# Patient Record
Sex: FEMALE | Race: WHITE | NOT HISPANIC OR LATINO | URBAN - METROPOLITAN AREA
[De-identification: names, ages, dates, MRNs, and addresses within clinical notes are randomized per-mention and may not be internally consistent; named-entity substitution may affect disease eponyms.]

---

## 2023-05-08 ENCOUNTER — INPATIENT (INPATIENT)
Facility: HOSPITAL | Age: 73
LOS: 13 days | Discharge: NURSING, REHAB OR LONG-TERM CARE | DRG: 177 | End: 2023-05-22
Attending: GENERAL ACUTE CARE HOSPITAL | Admitting: GENERAL ACUTE CARE HOSPITAL
Payer: MEDICARE

## 2023-05-08 VITALS
DIASTOLIC BLOOD PRESSURE: 57 MMHG | OXYGEN SATURATION: 99 % | WEIGHT: 93.92 LBS | RESPIRATION RATE: 16 BRPM | HEART RATE: 89 BPM | HEIGHT: 67 IN | SYSTOLIC BLOOD PRESSURE: 125 MMHG | TEMPERATURE: 97 F

## 2023-05-08 DIAGNOSIS — R53.1 WEAKNESS: ICD-10-CM

## 2023-05-08 DIAGNOSIS — Z98.890 OTHER SPECIFIED POSTPROCEDURAL STATES: Chronic | ICD-10-CM

## 2023-05-08 LAB
ALBUMIN SERPL ELPH-MCNC: 1.8 G/DL — LOW (ref 3.3–5)
ALBUMIN SERPL ELPH-MCNC: 4.7 G/DL — SIGNIFICANT CHANGE UP (ref 3.3–5)
ALP SERPL-CCNC: 31 U/L — LOW (ref 40–120)
ALP SERPL-CCNC: 82 U/L — SIGNIFICANT CHANGE UP (ref 40–120)
ALT FLD-CCNC: 21 U/L — SIGNIFICANT CHANGE UP (ref 10–45)
ALT FLD-CCNC: 9 U/L — LOW (ref 10–45)
ANION GAP SERPL CALC-SCNC: 12 MMOL/L — SIGNIFICANT CHANGE UP (ref 5–17)
APAP SERPL-MCNC: <15 UG/ML — SIGNIFICANT CHANGE UP (ref 10–30)
APPEARANCE UR: ABNORMAL
AST SERPL-CCNC: 22 U/L — SIGNIFICANT CHANGE UP (ref 10–40)
AST SERPL-CCNC: 22 U/L — SIGNIFICANT CHANGE UP (ref 10–40)
BACTERIA # UR AUTO: ABNORMAL
BASE EXCESS BLDV CALC-SCNC: 6.6 MMOL/L — HIGH (ref -2–3)
BASOPHILS # BLD AUTO: 0.06 K/UL — SIGNIFICANT CHANGE UP (ref 0–0.2)
BASOPHILS NFR BLD AUTO: 1.2 % — SIGNIFICANT CHANGE UP (ref 0–2)
BILIRUB DIRECT SERPL-MCNC: <0.1 MG/DL — SIGNIFICANT CHANGE UP (ref 0–0.3)
BILIRUB INDIRECT FLD-MCNC: >0 MG/DL — LOW (ref 0.2–1)
BILIRUB SERPL-MCNC: 0.1 MG/DL — LOW (ref 0.2–1.2)
BILIRUB SERPL-MCNC: 0.4 MG/DL — SIGNIFICANT CHANGE UP (ref 0.2–1.2)
BILIRUB UR-MCNC: NEGATIVE — SIGNIFICANT CHANGE UP
BUN SERPL-MCNC: 34 MG/DL — HIGH (ref 7–23)
CA-I SERPL-SCNC: 1.29 MMOL/L — SIGNIFICANT CHANGE UP (ref 1.15–1.33)
CALCIUM SERPL-MCNC: 10.6 MG/DL — HIGH (ref 8.4–10.5)
CHLORIDE BLDV-SCNC: 101 MMOL/L — SIGNIFICANT CHANGE UP (ref 96–108)
CHLORIDE SERPL-SCNC: 101 MMOL/L — SIGNIFICANT CHANGE UP (ref 96–108)
CO2 BLDV-SCNC: 35 MMOL/L — HIGH (ref 22–26)
CO2 SERPL-SCNC: 28 MMOL/L — SIGNIFICANT CHANGE UP (ref 22–31)
COLOR SPEC: COLORLESS — SIGNIFICANT CHANGE UP
CREAT SERPL-MCNC: 0.44 MG/DL — LOW (ref 0.5–1.3)
CREAT SERPL-MCNC: 1.39 MG/DL — HIGH (ref 0.5–1.3)
D DIMER BLD IA.RAPID-MCNC: HIGH NG/ML DDU
DIFF PNL FLD: ABNORMAL
EGFR: 101 ML/MIN/1.73M2 — SIGNIFICANT CHANGE UP
EGFR: 40 ML/MIN/1.73M2 — LOW
EOSINOPHIL # BLD AUTO: 0.01 K/UL — SIGNIFICANT CHANGE UP (ref 0–0.5)
EOSINOPHIL NFR BLD AUTO: 0.2 % — SIGNIFICANT CHANGE UP (ref 0–6)
EPI CELLS # UR: 1 /HPF — SIGNIFICANT CHANGE UP
ETHANOL SERPL-MCNC: <10 MG/DL — SIGNIFICANT CHANGE UP (ref 0–10)
FLUAV AG NPH QL: SIGNIFICANT CHANGE UP
FLUBV AG NPH QL: SIGNIFICANT CHANGE UP
GAS PNL BLDV: 139 MMOL/L — SIGNIFICANT CHANGE UP (ref 136–145)
GAS PNL BLDV: SIGNIFICANT CHANGE UP
GLUCOSE BLDV-MCNC: 97 MG/DL — SIGNIFICANT CHANGE UP (ref 70–99)
GLUCOSE SERPL-MCNC: 99 MG/DL — SIGNIFICANT CHANGE UP (ref 70–99)
GLUCOSE UR QL: NEGATIVE — SIGNIFICANT CHANGE UP
HCO3 BLDV-SCNC: 33 MMOL/L — HIGH (ref 22–29)
HCT VFR BLD CALC: 47.2 % — HIGH (ref 34.5–45)
HCT VFR BLDA CALC: 48 % — HIGH (ref 34.5–46.5)
HGB BLD CALC-MCNC: 16.1 G/DL — SIGNIFICANT CHANGE UP (ref 11.7–16.1)
HGB BLD-MCNC: 16.1 G/DL — HIGH (ref 11.5–15.5)
HYALINE CASTS # UR AUTO: 1 /LPF — SIGNIFICANT CHANGE UP (ref 0–2)
IMM GRANULOCYTES NFR BLD AUTO: 0.2 % — SIGNIFICANT CHANGE UP (ref 0–0.9)
INR BLD: 0.93 RATIO — SIGNIFICANT CHANGE UP (ref 0.88–1.16)
KETONES UR-MCNC: NEGATIVE — SIGNIFICANT CHANGE UP
LACTATE BLDV-MCNC: 1.8 MMOL/L — SIGNIFICANT CHANGE UP (ref 0.5–2)
LEUKOCYTE ESTERASE UR-ACNC: ABNORMAL
LIDOCAIN IGE QN: 78 U/L — HIGH (ref 7–60)
LYMPHOCYTES # BLD AUTO: 0.76 K/UL — LOW (ref 1–3.3)
LYMPHOCYTES # BLD AUTO: 14.8 % — SIGNIFICANT CHANGE UP (ref 13–44)
MAGNESIUM SERPL-MCNC: 2.5 MG/DL — SIGNIFICANT CHANGE UP (ref 1.6–2.6)
MCHC RBC-ENTMCNC: 31.6 PG — SIGNIFICANT CHANGE UP (ref 27–34)
MCHC RBC-ENTMCNC: 34.1 GM/DL — SIGNIFICANT CHANGE UP (ref 32–36)
MCV RBC AUTO: 92.7 FL — SIGNIFICANT CHANGE UP (ref 80–100)
MONOCYTES # BLD AUTO: 0.36 K/UL — SIGNIFICANT CHANGE UP (ref 0–0.9)
MONOCYTES NFR BLD AUTO: 7 % — SIGNIFICANT CHANGE UP (ref 2–14)
NEUTROPHILS # BLD AUTO: 3.95 K/UL — SIGNIFICANT CHANGE UP (ref 1.8–7.4)
NEUTROPHILS NFR BLD AUTO: 76.6 % — SIGNIFICANT CHANGE UP (ref 43–77)
NITRITE UR-MCNC: POSITIVE
NRBC # BLD: 0 /100 WBCS — SIGNIFICANT CHANGE UP (ref 0–0)
PCO2 BLDV: 52 MMHG — HIGH (ref 39–42)
PH BLDV: 7.41 — SIGNIFICANT CHANGE UP (ref 7.32–7.43)
PH UR: 7 — SIGNIFICANT CHANGE UP (ref 5–8)
PHOSPHATE SERPL-MCNC: 2.4 MG/DL — LOW (ref 2.5–4.5)
PLATELET # BLD AUTO: 222 K/UL — SIGNIFICANT CHANGE UP (ref 150–400)
PO2 BLDV: 30 MMHG — SIGNIFICANT CHANGE UP (ref 25–45)
POTASSIUM BLDV-SCNC: 3.9 MMOL/L — SIGNIFICANT CHANGE UP (ref 3.5–5.1)
POTASSIUM SERPL-MCNC: 3.8 MMOL/L — SIGNIFICANT CHANGE UP (ref 3.5–5.3)
POTASSIUM SERPL-SCNC: 3.8 MMOL/L — SIGNIFICANT CHANGE UP (ref 3.5–5.3)
PROT SERPL-MCNC: 3.1 G/DL — LOW (ref 6–8.3)
PROT SERPL-MCNC: 7.5 G/DL — SIGNIFICANT CHANGE UP (ref 6–8.3)
PROT UR-MCNC: ABNORMAL
PROTHROM AB SERPL-ACNC: 10.7 SEC — SIGNIFICANT CHANGE UP (ref 10.5–13.4)
RBC # BLD: 5.09 M/UL — SIGNIFICANT CHANGE UP (ref 3.8–5.2)
RBC # FLD: 13.4 % — SIGNIFICANT CHANGE UP (ref 10.3–14.5)
RBC CASTS # UR COMP ASSIST: 3 /HPF — SIGNIFICANT CHANGE UP (ref 0–4)
RSV RNA NPH QL NAA+NON-PROBE: SIGNIFICANT CHANGE UP
SALICYLATES SERPL-MCNC: <2 MG/DL — LOW (ref 15–30)
SAO2 % BLDV: 47.1 % — LOW (ref 67–88)
SARS-COV-2 RNA SPEC QL NAA+PROBE: DETECTED
SODIUM SERPL-SCNC: 141 MMOL/L — SIGNIFICANT CHANGE UP (ref 135–145)
SP GR SPEC: 1.01 — LOW (ref 1.01–1.02)
UROBILINOGEN FLD QL: NEGATIVE — SIGNIFICANT CHANGE UP
WBC # BLD: 5.15 K/UL — SIGNIFICANT CHANGE UP (ref 3.8–10.5)
WBC # FLD AUTO: 5.15 K/UL — SIGNIFICANT CHANGE UP (ref 3.8–10.5)
WBC UR QL: 314 /HPF — HIGH (ref 0–5)

## 2023-05-08 PROCEDURE — 71045 X-RAY EXAM CHEST 1 VIEW: CPT | Mod: 26

## 2023-05-08 PROCEDURE — 99285 EMERGENCY DEPT VISIT HI MDM: CPT | Mod: GC

## 2023-05-08 PROCEDURE — 70450 CT HEAD/BRAIN W/O DYE: CPT | Mod: 26,MA

## 2023-05-08 RX ORDER — SODIUM CHLORIDE 9 MG/ML
500 INJECTION INTRAMUSCULAR; INTRAVENOUS; SUBCUTANEOUS ONCE
Refills: 0 | Status: COMPLETED | OUTPATIENT
Start: 2023-05-08 | End: 2023-05-08

## 2023-05-08 RX ORDER — ENOXAPARIN SODIUM 100 MG/ML
30 INJECTION SUBCUTANEOUS EVERY 24 HOURS
Refills: 0 | Status: DISCONTINUED | OUTPATIENT
Start: 2023-05-08 | End: 2023-05-09

## 2023-05-08 RX ORDER — REMDESIVIR 5 MG/ML
INJECTION INTRAVENOUS
Refills: 0 | Status: COMPLETED | OUTPATIENT
Start: 2023-05-09 | End: 2023-05-13

## 2023-05-08 RX ORDER — CEFTRIAXONE 500 MG/1
1000 INJECTION, POWDER, FOR SOLUTION INTRAMUSCULAR; INTRAVENOUS EVERY 24 HOURS
Refills: 0 | Status: DISCONTINUED | OUTPATIENT
Start: 2023-05-08 | End: 2023-05-10

## 2023-05-08 RX ORDER — CEFTRIAXONE 500 MG/1
1000 INJECTION, POWDER, FOR SOLUTION INTRAMUSCULAR; INTRAVENOUS ONCE
Refills: 0 | Status: COMPLETED | OUTPATIENT
Start: 2023-05-08 | End: 2023-05-08

## 2023-05-08 RX ORDER — SODIUM CHLORIDE 9 MG/ML
1000 INJECTION INTRAMUSCULAR; INTRAVENOUS; SUBCUTANEOUS ONCE
Refills: 0 | Status: COMPLETED | OUTPATIENT
Start: 2023-05-08 | End: 2023-05-08

## 2023-05-08 RX ORDER — REMDESIVIR 5 MG/ML
100 INJECTION INTRAVENOUS EVERY 24 HOURS
Refills: 0 | Status: COMPLETED | OUTPATIENT
Start: 2023-05-10 | End: 2023-05-13

## 2023-05-08 RX ORDER — REMDESIVIR 5 MG/ML
200 INJECTION INTRAVENOUS EVERY 24 HOURS
Refills: 0 | Status: COMPLETED | OUTPATIENT
Start: 2023-05-09 | End: 2023-05-09

## 2023-05-08 RX ORDER — LEVOTHYROXINE SODIUM 125 MCG
112 TABLET ORAL DAILY
Refills: 0 | Status: DISCONTINUED | OUTPATIENT
Start: 2023-05-08 | End: 2023-05-11

## 2023-05-08 RX ORDER — SODIUM CHLORIDE 9 MG/ML
1000 INJECTION INTRAMUSCULAR; INTRAVENOUS; SUBCUTANEOUS
Refills: 0 | Status: DISCONTINUED | OUTPATIENT
Start: 2023-05-08 | End: 2023-05-12

## 2023-05-08 RX ADMIN — SODIUM CHLORIDE 80 MILLILITER(S): 9 INJECTION INTRAMUSCULAR; INTRAVENOUS; SUBCUTANEOUS at 23:01

## 2023-05-08 RX ADMIN — SODIUM CHLORIDE 1000 MILLILITER(S): 9 INJECTION INTRAMUSCULAR; INTRAVENOUS; SUBCUTANEOUS at 14:54

## 2023-05-08 RX ADMIN — SODIUM CHLORIDE 500 MILLILITER(S): 9 INJECTION INTRAMUSCULAR; INTRAVENOUS; SUBCUTANEOUS at 13:29

## 2023-05-08 RX ADMIN — CEFTRIAXONE 1000 MILLIGRAM(S): 500 INJECTION, POWDER, FOR SOLUTION INTRAMUSCULAR; INTRAVENOUS at 15:24

## 2023-05-08 RX ADMIN — SODIUM CHLORIDE 1000 MILLILITER(S): 9 INJECTION INTRAMUSCULAR; INTRAVENOUS; SUBCUTANEOUS at 15:54

## 2023-05-08 RX ADMIN — CEFTRIAXONE 100 MILLIGRAM(S): 500 INJECTION, POWDER, FOR SOLUTION INTRAMUSCULAR; INTRAVENOUS at 14:54

## 2023-05-08 RX ADMIN — SODIUM CHLORIDE 500 MILLILITER(S): 9 INJECTION INTRAMUSCULAR; INTRAVENOUS; SUBCUTANEOUS at 12:29

## 2023-05-08 NOTE — ED PROVIDER NOTE - CLINICAL SUMMARY MEDICAL DECISION MAKING FREE TEXT BOX
Tanna [age]-year-old female with history of bipolar depression not on any medications presenting to the ED with complaints of decreased appetite.  Per EMS patient lives at home with her  who passed in 2020.  Had initially been active following her 's passing however has been progressively having decreased activity, decreased p.o. intake.  Her neighbor comes and checks on her several times a week and was concerned today.  Patient reports that she has been losing weight unable to quantify how much and over how long a period of time.  Denies any abdominal pain or difficulty eating.  Notes decreased appetite.  Had a salad for dinner last night.  States decreased bowel movements due to decreased p.o. intake, last bowel movement several weeks ago.  States that she has trouble concentrating and comprehending.  Denies headache urinary symptoms.  Denies thoughts of hurting herself or others.    Patient in no acute distress, flat affect.  Slow to respond with difficulty answering questions.  Poor historian.  Atraumatic normocephalic, dry mucous membranes.  Regular rate and rhythm, lungs clear to auscultation bilaterally no increased work of breathing saturating well on room air and speaking in full sentences.  No anterior chest wall tenderness.  Abdomen is soft nontender no rebound or guarding.  No lower extremity edema.  Moving all extremities spontaneously no focal neurological deficits.  Patient with possible depressive episode, infection.  Patient slow to respond states difficulty comprehending will obtain CT head, labs, chest x-ray, concern for possible starvation acidosis hyponatremia.  IV fluid and admission.

## 2023-05-08 NOTE — H&P ADULT - HISTORY OF PRESENT ILLNESS
72-year-old female with history of bipolar disorder and dementia ?   hypothyroidism s/p thyroidectomy ( had thyroid cancer )   comes into the ED via EMS for failure to thrive.  Spoke with her niece,     Mylene   at [phone], who lives in [place] and is the patient's healthcare proxy. pt is a  , lives by herself ... her neighbor would take her food shopping.belongs to [employer] who also help.   she is incoherent and " withdrawn" and confused...  Lost her " common sense"   was not eating or drinking and lost 20lbs   She used to live with her  that he passed away in September 2020.  She was initially depressed for a brief period of time but returned to her baseline and was active until a month ago.  Her niece reports she used to be on lithium for her bipolar but has not been treated for the past 8 years.  she also reports she used to be on valium for " extreme insomnia " which pt had stopped for " sometime...     Currently in the emergency department patient does not complain of any pain or discomfort.  She states she has a decreased appetite and does not have any pain, nausea, vomiting or inability to eat.  Patient is not able to provide much of the history and she keeps stating she feels out of it and is unable to concentrate on anything.    pt was found to have COVID positve RVP.. a month ago she had fever , vomitting..    pt is not able to provide any information

## 2023-05-08 NOTE — ED PROVIDER NOTE - OBJECTIVE STATEMENT
72-year-old female with history of bipolar disorder comes into the ED via EMS for failure to thrive.  Spoke with her niece, Mylene pringle at [phone], who lives in [place] and is the patient's healthcare proxy.  She states the patient over the past month has not been acting like herself.  She has been eating significantly less, less active, not going to [employer] which she regularly does over the past 1 month. She used to live with her  that he passed away in September 2020.  She was initially depressed for a brief period of time but returned to her baseline and was active until a month ago.  Her niece reports she used to be on lithium for her bipolar but has not been treated for the past 8 years. Currently in the emergency department patient does not complain of any pain or discomfort.  She states she has a decreased appetite and does not have any pain, nausea, vomiting or inability to eat.  Patient is not able to provide much of the history and she keeps stating she feels out of it and is unable to concentrate on anything.

## 2023-05-08 NOTE — ED PROVIDER NOTE - ATTENDING CONTRIBUTION TO CARE
I, Candice Cisse, performed a history and physical exam of the patient and discussed their management with the resident and /or advanced care provider. I reviewed the resident and /or ACP's note and agree with the documented findings and plan of care. I was present and available for all procedures.   see MDM

## 2023-05-08 NOTE — ED ADULT NURSE NOTE - OBJECTIVE STATEMENT
The patient is an axo x3 72y female who came in with EMS for failure to thrive. Patient has a PMH of Bipolar, Depression, and poor appetite. Per patient her last meal was 7pm 5/8, she states "Her family may be disappointed of her eating habits". PT verbalizes having concerns with "Letting everyone down". She has right hearing loss and complained of dry mouth. PT has trouble concentrating and answering questions frequently. Upon assessment patient looks visibly malnourished, lungs are clear, and note a bruising area with minimal breakdown to the sacrum (MD [employer]). PT denies pain with eating or any pain/discomfort @ this time, and reports no BM in weeks. Pt denies chest pain, palpitations, shortness of breath, headache, visual disturbances, numbness/tingling, fever, chills, diaphoresis,  nausea, vomiting, constipation, diarrhea, or urinary symptoms. Safety and comfort measures provided, bed locked and in lowest position, side rails up for safety. Call bell within reach. Left PIV placed, labs drawn, patient given oral nutrition, and awaiting results.

## 2023-05-08 NOTE — ED ADULT NURSE NOTE - BREATH SOUNDS, MLM
Problem: Pressure Injury - Risk of  Goal: *Prevention of pressure injury  Description: Document Jonah Scale and appropriate interventions in the flowsheet. Outcome: Progressing Towards Goal  Note: Pressure Injury Interventions:  Sensory Interventions: Assess changes in LOC, Check visual cues for pain, Keep linens dry and wrinkle-free    Moisture Interventions: Absorbent underpads, Apply protective barrier, creams and emollients, Check for incontinence Q2 hours and as needed    Activity Interventions: Increase time out of bed    Mobility Interventions: HOB 30 degrees or less    Nutrition Interventions: Document food/fluid/supplement intake    Friction and Shear Interventions: Minimize layers                Problem: Patient Education: Go to Patient Education Activity  Goal: Patient/Family Education  Outcome: Progressing Towards Goal     Problem: Falls - Risk of  Goal: *Absence of Falls  Description: Document Carlos Fall Risk and appropriate interventions in the flowsheet.   Outcome: Progressing Towards Goal  Note: Fall Risk Interventions:  Mobility Interventions: Utilize walker, cane, or other assistive device    Mentation Interventions: Adequate sleep, hydration, pain control, Bed/chair exit alarm, Door open when patient unattended    Medication Interventions: Bed/chair exit alarm, Patient to call before getting OOB, Teach patient to arise slowly    Elimination Interventions: Bed/chair exit alarm, Call light in reach    History of Falls Interventions: Bed/chair exit alarm, Door open when patient unattended, Room close to nurse's station         Problem: Patient Education: Go to Patient Education Activity  Goal: Patient/Family Education  Outcome: Progressing Towards Goal Clear

## 2023-05-08 NOTE — H&P ADULT - ASSESSMENT
72-year-old female with history of bipolar disorder and dementia ?   hypothyroidism s/p thyroidectomy ( had thyroid cancer )   comes into the ED via EMS for failure to thrive.  Spoke with her niece,     Mylene   at [phone], who lives in [place] and is the patient's healthcare proxy. pt is a  , lives by herself ... her neighbor would take her food shopping.belongs to [employer] who also help.   she is incoherent and " withdrawn" and confused...  Lost her " common sense"   was not eating or drinking and lost 20lbs   She used to live with her  that he passed away in September 2020.  She was initially depressed for a brief period of time but returned to her baseline and was active until a month ago.  Her niece reports she used to be on lithium for her bipolar but has not been treated for the past 8 years.  she also reports she used to be on valium for " extreme insomnia " which pt had stopped for " sometime...     Currently in the emergency department patient does not complain of any pain or discomfort.  She states she has a decreased appetite and does not have any pain, nausea, vomiting or inability to eat.  Patient is not able to provide much of the history and she keeps stating she feels out of it and is unable to concentrate on anything.    pt was found to have COVID positve RVP.. a month ago she had fever , vomitting..    pt is not able to provide any information  partially she because she is hard of hearing     - COVID  infection unclear time of exposure   due to recent changes in status : will give remdesivir   no indication for dexa     hx of bipolar : has nt been on meds and does not seem to be actively psychotic     FTT : ivf     d/w HCP [facility]  will fu final decision but does not seem to want her to be resusciated ..had not signed papers in past  72-year-old female with history of bipolar disorder and dementia ?   hypothyroidism s/p thyroidectomy ( had thyroid cancer )   comes into the ED via EMS for failure to thrive.  Spoke with her niece,     Mylene   at [phone], who lives in [place] and is the patient's healthcare proxy. pt is a  , lives by herself ... her neighbor would take her food shopping.belongs to [employer] who also help.   she is incoherent and " withdrawn" and confused...  Lost her " common sense"   was not eating or drinking and lost 20lbs   She used to live with her  that he passed away in September 2020.  She was initially depressed for a brief period of time but returned to her baseline and was active until a month ago.  Her niece reports she used to be on lithium for her bipolar but has not been treated for the past 8 years.  she also reports she used to be on valium for " extreme insomnia " which pt had stopped for " sometime...     Currently in the emergency department patient does not complain of any pain or discomfort.  She states she has a decreased appetite and does not have any pain, nausea, vomiting or inability to eat.  Patient is not able to provide much of the history and she keeps stating she feels out of it and is unable to concentrate on anything.    pt was found to have COVID positve RVP.. a month ago she had fever , vomitting..    pt is not able to provide any information  partially she because she is hard of hearing     - COVID  infection unclear time of exposure   due to recent changes in status : will give remdesivir   no indication for dexa     hx of bipolar : has nt been on meds and does not seem to be actively psychotic     FTT : ivf     JAYLA : monitor  IVF     d/w HCP [facility]  will fu final decision but does not seem to want her to be resusciated ..had not signed papers in past

## 2023-05-08 NOTE — H&P ADULT - NSHPSOURCEINFORD_GEN_ALL_CORE
[address] [address]  Phone: [phone]  Fax: [phone]    Tomás Brizuela MD        February 7, 2022     Patient: Ankit Cee   YOB: 2007   Date of Visit: 2/7/2022       To Whom it May Concern:    Ankit Cee was seen in my clinic on 11/2/2022. At that time we discussed patient's e-cigarette use, discussed health risks associated with use and encouraged her to stop using. If you have any questions or concerns, please don't hesitate to call.     Sincerely,         Tomás Brizuela MD
Chart(s)/Patient

## 2023-05-08 NOTE — ED PROVIDER NOTE - PHYSICAL EXAMINATION
GENERAL: + Appears frail, Not in acute distress, non-toxic appearing  HEAD: normocephalic, atraumatic  HEENT: PERRLA, EOMI, normal conjunctiva, oral mucosa moist, neck supple  CARDIAC: regular rate and rhythm, normal S1 and S2,  no appreciable murmurs  PULM: clear to ascultation bilaterally, no crackles, rales, rhonchi, or wheezing  GI: abdomen nondistended, soft, nontender, no guarding or rebound tenderness  : No CVA tenderness, no suprapubic tenderness  NEURO: alert and oriented x 3, normal speech, no focal motor or sensory deficits, gait normal, no gross neurologic deficit  MSK: No visible deformities, no peripheral edema, calf tenderness/redness/swelling  SKIN: + mild skin breakdown over the lower back with underlying ecchymosis. No tenderness to the lower back, dry, well-perfused  PSYCH: Depressed mood, slow to respond, Unable to concentrate on conversation, slightly flat affect

## 2023-05-09 LAB
ALBUMIN SERPL ELPH-MCNC: 4.9 G/DL — SIGNIFICANT CHANGE UP (ref 3.3–5)
ALBUMIN SERPL ELPH-MCNC: 4.9 G/DL — SIGNIFICANT CHANGE UP (ref 3.3–5)
ALP SERPL-CCNC: 89 U/L — SIGNIFICANT CHANGE UP (ref 40–120)
ALP SERPL-CCNC: 89 U/L — SIGNIFICANT CHANGE UP (ref 40–120)
ALT FLD-CCNC: 22 U/L — SIGNIFICANT CHANGE UP (ref 10–45)
ALT FLD-CCNC: 22 U/L — SIGNIFICANT CHANGE UP (ref 10–45)
ANION GAP SERPL CALC-SCNC: 16 MMOL/L — SIGNIFICANT CHANGE UP (ref 5–17)
AST SERPL-CCNC: 24 U/L — SIGNIFICANT CHANGE UP (ref 10–40)
AST SERPL-CCNC: 25 U/L — SIGNIFICANT CHANGE UP (ref 10–40)
BASOPHILS # BLD AUTO: 0.04 K/UL — SIGNIFICANT CHANGE UP (ref 0–0.2)
BASOPHILS NFR BLD AUTO: 0.6 % — SIGNIFICANT CHANGE UP (ref 0–2)
BILIRUB DIRECT SERPL-MCNC: <0.1 MG/DL — SIGNIFICANT CHANGE UP (ref 0–0.3)
BILIRUB INDIRECT FLD-MCNC: >0.3 MG/DL — SIGNIFICANT CHANGE UP (ref 0.2–1)
BILIRUB SERPL-MCNC: 0.4 MG/DL — SIGNIFICANT CHANGE UP (ref 0.2–1.2)
BILIRUB SERPL-MCNC: 0.4 MG/DL — SIGNIFICANT CHANGE UP (ref 0.2–1.2)
BUN SERPL-MCNC: 20 MG/DL — SIGNIFICANT CHANGE UP (ref 7–23)
CALCIUM SERPL-MCNC: 10.9 MG/DL — HIGH (ref 8.4–10.5)
CHLORIDE SERPL-SCNC: 110 MMOL/L — HIGH (ref 96–108)
CO2 SERPL-SCNC: 26 MMOL/L — SIGNIFICANT CHANGE UP (ref 22–31)
CREAT SERPL-MCNC: 1.01 MG/DL — SIGNIFICANT CHANGE UP (ref 0.5–1.3)
CREAT SERPL-MCNC: 1.01 MG/DL — SIGNIFICANT CHANGE UP (ref 0.5–1.3)
CRP SERPL-MCNC: <3 MG/L — SIGNIFICANT CHANGE UP (ref 0–4)
EGFR: 58 ML/MIN/1.73M2 — LOW
EGFR: 58 ML/MIN/1.73M2 — LOW
EOSINOPHIL # BLD AUTO: 0.01 K/UL — SIGNIFICANT CHANGE UP (ref 0–0.5)
EOSINOPHIL NFR BLD AUTO: 0.1 % — SIGNIFICANT CHANGE UP (ref 0–6)
ERYTHROCYTE [SEDIMENTATION RATE] IN BLOOD: 30 MM/HR — HIGH (ref 0–20)
FERRITIN SERPL-MCNC: 53 NG/ML — SIGNIFICANT CHANGE UP (ref 15–150)
FOLATE SERPL-MCNC: >20 NG/ML — SIGNIFICANT CHANGE UP
GLUCOSE SERPL-MCNC: 99 MG/DL — SIGNIFICANT CHANGE UP (ref 70–99)
HCT VFR BLD CALC: 49.5 % — HIGH (ref 34.5–45)
HCV AB S/CO SERPL IA: 0.13 S/CO — SIGNIFICANT CHANGE UP (ref 0–0.99)
HCV AB SERPL-IMP: SIGNIFICANT CHANGE UP
HGB BLD-MCNC: 17 G/DL — HIGH (ref 11.5–15.5)
IMM GRANULOCYTES NFR BLD AUTO: 0.3 % — SIGNIFICANT CHANGE UP (ref 0–0.9)
INR BLD: 0.99 RATIO — SIGNIFICANT CHANGE UP (ref 0.88–1.16)
LYMPHOCYTES # BLD AUTO: 0.78 K/UL — LOW (ref 1–3.3)
LYMPHOCYTES # BLD AUTO: 11.4 % — LOW (ref 13–44)
MCHC RBC-ENTMCNC: 31.9 PG — SIGNIFICANT CHANGE UP (ref 27–34)
MCHC RBC-ENTMCNC: 34.3 GM/DL — SIGNIFICANT CHANGE UP (ref 32–36)
MCV RBC AUTO: 92.9 FL — SIGNIFICANT CHANGE UP (ref 80–100)
MONOCYTES # BLD AUTO: 0.43 K/UL — SIGNIFICANT CHANGE UP (ref 0–0.9)
MONOCYTES NFR BLD AUTO: 6.3 % — SIGNIFICANT CHANGE UP (ref 2–14)
NEUTROPHILS # BLD AUTO: 5.57 K/UL — SIGNIFICANT CHANGE UP (ref 1.8–7.4)
NEUTROPHILS NFR BLD AUTO: 81.3 % — HIGH (ref 43–77)
NRBC # BLD: 0 /100 WBCS — SIGNIFICANT CHANGE UP (ref 0–0)
PLATELET # BLD AUTO: 227 K/UL — SIGNIFICANT CHANGE UP (ref 150–400)
POTASSIUM SERPL-MCNC: 3.5 MMOL/L — SIGNIFICANT CHANGE UP (ref 3.5–5.3)
POTASSIUM SERPL-SCNC: 3.5 MMOL/L — SIGNIFICANT CHANGE UP (ref 3.5–5.3)
PROT SERPL-MCNC: 7.9 G/DL — SIGNIFICANT CHANGE UP (ref 6–8.3)
PROT SERPL-MCNC: 8 G/DL — SIGNIFICANT CHANGE UP (ref 6–8.3)
PROTHROM AB SERPL-ACNC: 11.5 SEC — SIGNIFICANT CHANGE UP (ref 10.5–13.4)
RBC # BLD: 5.33 M/UL — HIGH (ref 3.8–5.2)
RBC # FLD: 13.5 % — SIGNIFICANT CHANGE UP (ref 10.3–14.5)
SODIUM SERPL-SCNC: 152 MMOL/L — HIGH (ref 135–145)
TSH SERPL-MCNC: 0.06 UIU/ML — LOW (ref 0.27–4.2)
VIT B12 SERPL-MCNC: 1428 PG/ML — HIGH (ref 232–1245)
WBC # BLD: 6.85 K/UL — SIGNIFICANT CHANGE UP (ref 3.8–10.5)
WBC # FLD AUTO: 6.85 K/UL — SIGNIFICANT CHANGE UP (ref 3.8–10.5)

## 2023-05-09 PROCEDURE — 99221 1ST HOSP IP/OBS SF/LOW 40: CPT

## 2023-05-09 RX ORDER — SENNA PLUS 8.6 MG/1
2 TABLET ORAL AT BEDTIME
Refills: 0 | Status: DISCONTINUED | OUTPATIENT
Start: 2023-05-09 | End: 2023-05-22

## 2023-05-09 RX ORDER — CHLORHEXIDINE GLUCONATE 213 G/1000ML
1 SOLUTION TOPICAL DAILY
Refills: 0 | Status: DISCONTINUED | OUTPATIENT
Start: 2023-05-09 | End: 2023-05-22

## 2023-05-09 RX ORDER — ENOXAPARIN SODIUM 100 MG/ML
40 INJECTION SUBCUTANEOUS EVERY 12 HOURS
Refills: 0 | Status: DISCONTINUED | OUTPATIENT
Start: 2023-05-09 | End: 2023-05-13

## 2023-05-09 RX ADMIN — SENNA PLUS 2 TABLET(S): 8.6 TABLET ORAL at 21:30

## 2023-05-09 RX ADMIN — REMDESIVIR 200 MILLIGRAM(S): 5 INJECTION INTRAVENOUS at 10:01

## 2023-05-09 RX ADMIN — Medication 112 MICROGRAM(S): at 05:01

## 2023-05-09 RX ADMIN — ENOXAPARIN SODIUM 40 MILLIGRAM(S): 100 INJECTION SUBCUTANEOUS at 17:00

## 2023-05-09 RX ADMIN — ENOXAPARIN SODIUM 30 MILLIGRAM(S): 100 INJECTION SUBCUTANEOUS at 05:01

## 2023-05-09 RX ADMIN — SODIUM CHLORIDE 80 MILLILITER(S): 9 INJECTION INTRAMUSCULAR; INTRAVENOUS; SUBCUTANEOUS at 05:01

## 2023-05-09 RX ADMIN — CHLORHEXIDINE GLUCONATE 1 APPLICATION(S): 213 SOLUTION TOPICAL at 13:41

## 2023-05-09 RX ADMIN — CEFTRIAXONE 100 MILLIGRAM(S): 500 INJECTION, POWDER, FOR SOLUTION INTRAMUSCULAR; INTRAVENOUS at 21:30

## 2023-05-09 RX ADMIN — SODIUM CHLORIDE 80 MILLILITER(S): 9 INJECTION INTRAMUSCULAR; INTRAVENOUS; SUBCUTANEOUS at 21:30

## 2023-05-09 NOTE — PROGRESS NOTE ADULT - SUBJECTIVE AND OBJECTIVE BOX
Date of service: 05-09-23 @ 15:51      Patient is a 72y old  Female who presents with a chief complaint of FTT (09 May 2023 09:47)                                                               INTERVAL HPI/OVERNIGHT EVENTS:    REVIEW OF SYSTEMS:     denies any complaints of cough   ssob                                                                                                                                                                                                                                                                              Medications:  MEDICATIONS  (STANDING):  cefTRIAXone   IVPB 1000 milliGRAM(s) IV Intermittent every 24 hours  chlorhexidine 2% Cloths 1 Application(s) Topical daily  enoxaparin Injectable 40 milliGRAM(s) SubCutaneous every 12 hours  levothyroxine 112 MICROGram(s) Oral daily  remdesivir  IVPB   IV Intermittent   sodium chloride 0.9%. 1000 milliLiter(s) (80 mL/Hr) IV Continuous <Continuous>    MEDICATIONS  (PRN):       Allergies    No Known Allergies    Intolerances      Vital Signs Last 24 Hrs  T(C): 36.7 (09 May 2023 12:32), Max: 36.8 (09 May 2023 08:57)  T(F): 98.1 (09 May 2023 12:32), Max: 98.2 (09 May 2023 08:57)  HR: 91 (09 May 2023 12:32) (89 - 114)  BP: 146/84 (09 May 2023 12:32) (138/90 - 150/115)  BP(mean): 121 (08 May 2023 18:51) (121 - 121)  RR: 18 (09 May 2023 12:32) (18 - 20)  SpO2: 100% (09 May 2023 12:32) (98% - 100%)    Parameters below as of 09 May 2023 12:32  Patient On (Oxygen Delivery Method): room air      CAPILLARY BLOOD GLUCOSE          05-08 @ 07:01  -  05-09 @ 07:00  --------------------------------------------------------  IN: 150 mL / OUT: 900 mL / NET: -750 mL    05-09 @ 07:01  -  05-09 @ 15:51  --------------------------------------------------------  IN: 480 mL / OUT: 800 mL / NET: -320 mL      Physical Exam:    Daily     Daily   General:  NAD   HEENT:  Nonicteric, PERRLA  CV:  RRR, S1S2   Lungs:  CTA B/L, no wheezes, rales, rhonchi  Abdomen:  Soft, non-tender, no distended, positive BS  Extremities:  2+ pulses, no c/c, no edema  Skin:  Warm and dry, no rashes  :  No de guzman  Neuro:  AAOx3, non-focal, grossly intact                                                                                                                                                                                                                                                                                                LABS:                               17.0   6.85  )-----------( 227      ( 09 May 2023 07:35 )             49.5                      05-09    152<H>  |  110<H>  |  20  ----------------------------<  99  3.5   |  26  |  1.01    Ca    10.9<H>      09 May 2023 07:33  Phos  2.4     05-08  Mg     2.5     05-08    TPro  8.0  /  Alb  4.9  /  TBili  0.4  /  DBili  <0.1  /  AST  24  /  ALT  22  /  AlkPhos  89  05-09                       RADIOLOGY & ADDITIONAL TESTS         I personally reviewed: [  ]EKG   [  ]CXR    [  ] CT      A/P:         Discussed with :     Becky consultants' Notes   Time spent :

## 2023-05-09 NOTE — PATIENT PROFILE ADULT - FALL HARM RISK - HARM RISK INTERVENTIONS

## 2023-05-09 NOTE — CONSULT NOTE ADULT - SUBJECTIVE AND OBJECTIVE BOX
Wound Surgery Consult Note:    HPI:  72-year-old female with history of bipolar disorder and dementia ?   hypothyroidism s/p thyroidectomy ( had thyroid cancer )   comes into the ED via EMS for failure to thrive.  Spoke with her niece,     Mylene   at [phone], who lives in [place] and is the patient's healthcare proxy. pt is a  , lives by herself ... her neighbor would take her food shopping.belongs to [employer] who also help.   she is incoherent and " withdrawn" and confused...  Lost her " common sense"   was not eating or drinking and lost 20lbs   She used to live with her  that he passed away in 2020.  She was initially depressed for a brief period of time but returned to her baseline and was active until a month ago.  Her niece reports she used to be on lithium for her bipolar but has not been treated for the past 8 years.  she also reports she used to be on valium for " extreme insomnia " which pt had stopped for " sometime...     Currently in the emergency department patient does not complain of any pain or discomfort.  She states she has a decreased appetite and does not have any pain, nausea, vomiting or inability to eat.  Patient is not able to provide much of the history and she keeps stating she feels out of it and is unable to concentrate on anything.    pt was found to have COVID positve RVP.. a month ago she had fever , vomitting..    pt is not able to provide any information  (08 May 2023 17:53)    Request for wound care consult for sacral/bilateral buttocks skin breakdown received from nursing. Ms. Aguirre was encountered on an alternating air with low air loss surface. She is incontinent of stool and urinet.  Her extreme immobility, inactivity, incontinence of stool as well as poor nutritional status all contribute to her high risk for pressure injury development and hinder healing. Identification of the initial signs of deep tissue damage at the level of the skin within 72 hours of admission make this an injury that occurred prior to admission.    Principles of pressure injury prevention and treatment including but not limited to offloading and turning and repositioning review with patient though it is unlikely to retain any of this information.    PAST MEDICAL & SURGICAL HISTORY:  Hypothyroid  Thyroid cancer  History of thyroid surgery    REVIEW OF SYSTEMS  Unable to obtain due to patient's confusion    MEDICATIONS  (STANDING):  cefTRIAXone   IVPB 1000 milliGRAM(s) IV Intermittent every 24 hours  chlorhexidine 2% Cloths 1 Application(s) Topical daily  enoxaparin Injectable 40 milliGRAM(s) SubCutaneous every 12 hours  levothyroxine 112 MICROGram(s) Oral daily  remdesivir  IVPB   IV Intermittent   remdesivir  IVPB 200 milliGRAM(s) IV Intermittent every 24 hours  sodium chloride 0.9%. 1000 milliLiter(s) (80 mL/Hr) IV Continuous <Continuous>    MEDICATIONS  (PRN):    Allergies    No Known Allergies    Intolerances    SOCIAL HISTORY:  unable to obtain due to patient's confusion    FAMILY HISTORY: no pertinent family history among first degree relatives    Vital Signs Last 24 Hrs  T(C): 36.8 (09 May 2023 08:57), Max: 36.8 (09 May 2023 08:57)  T(F): 98.2 (09 May 2023 08:57), Max: 98.2 (09 May 2023 08:57)  HR: 92 (09 May 2023 08:57) (89 - 114)  BP: 138/90 (09 May 2023 08:57) (125/57 - 156/95)  BP(mean): 121 (08 May 2023 18:51) (98 - 121)  RR: 18 (09 May 2023 08:57) (16 - 20)  SpO2: 99% (09 May 2023 08:57) (98% - 100%)    Parameters below as of 09 May 2023 08:57  Patient On (Oxygen Delivery Method): room air    Physical Exam:  General: Pleasantly confused, WN  Ophthamology: sclera clear  ENMT: moist mucous membranes, trachea midline  Respiratory: equal chest rise with respirations  Gastrointestinal: soft NT/ND  Neurology: verbal, following commands with much direction  Psych: calm, confused  Musculoskeletal: no contractures  Vascular: BLE edema equal  Skin:  Sacral/bilateral buttocks with deep maroon discolored intact skin in and around the gluteal cleft in irregular vertical streaks over the whole bilateral buttocks, L 10cm x W 14cm x D none, no drainage, +TTP  No odor, erythema, increased warmth, induration, fluctuance    LABS:      152<H>  |  110<H>  |  20  ----------------------------<  99  3.5   |  26  |  1.01    Ca    10.9<H>      09 May 2023 07:33  Phos  2.4     05-08  Mg     2.5     05-08    TPro  8.0  /  Alb  4.9  /  TBili  0.4  /  DBili  <0.1  /  AST  24  /  ALT  22  /  AlkPhos  89                            17.0   6.85  )-----------( 227      ( 09 May 2023 07:35 )             49.5     PT/INR - ( 09 May 2023 07:35 )   PT: 11.5 sec;   INR: 0.99 ratio           Urinalysis Basic - ( 08 May 2023 12:29 )    Color: Colorless / Appearance: Slightly Turbid / S.008 / pH: x  Gluc: x / Ketone: Negative  / Bili: Negative / Urobili: Negative   Blood: x / Protein: Trace / Nitrite: Positive   Leuk Esterase: Large / RBC: 3 /hpf /  /HPF   Sq Epi: x / Non Sq Epi: x / Bacteria: Many

## 2023-05-09 NOTE — CONSULT NOTE ADULT - ASSESSMENT
Impression:    Sacral/bilateral Buttocks deep tissue injury present on admission  Incontinence of bowel and bladder  Incontinence Dermatitis    Recommend:  1.) topical therapy: sacral/buttock injury - cleanse with incontinence cleanser, pat dry, apply allevyn foam dressing every other day  2.) Incontinence Management - incontinence cleanser, pads, pericare BID, external female urinary catheter  3.) Maintain on an alternating air with low air loss surface  4.) Turn and reposition Q 2 hours  5.) Nutrition optimization  6.) Offload heels/feet with complete cair air fluidized boots; ensure that the soles of the feet are not resting on the foot board of the bed.    Care as per medicine. Will not actively follow but will remain available. Please recall for new issues or deterioration.  Upon discharge [facility] [address] [place] [address] [phone]  Thank you for this consult  Whitney Noe, NP-C, [facility]OCN via TEAMS

## 2023-05-09 NOTE — PROGRESS NOTE ADULT - ASSESSMENT
72-year-old female with history of bipolar disorder and dementia ?   hypothyroidism s/p thyroidectomy ( had thyroid cancer )   comes into the ED via EMS for failure to thrive.  Spoke with her niece,     Mylene   at [phone], who lives in [place] and is the patient's healthcare proxy. pt is a  , lives by herself ... her neighbor would take her food shopping.belongs to [employer] who also help.   she is incoherent and " withdrawn" and confused...  Lost her " common sense"   was not eating or drinking and lost 20lbs   She used to live with her  that he passed away in September 2020.  She was initially depressed for a brief period of time but returned to her baseline and was active until a month ago.  Her niece reports she used to be on lithium for her bipolar but has not been treated for the past 8 years.  she also reports she used to be on valium for " extreme insomnia " which pt had stopped for " sometime...     Currently in the emergency department patient does not complain of any pain or discomfort.  She states she has a decreased appetite and does not have any pain, nausea, vomiting or inability to eat.  Patient is not able to provide much of the history and she keeps stating she feels out of it and is unable to concentrate on anything.    pt was found to have COVID positve RVP.. a month ago she had fever , vomitting..    pt is not able to provide any information  partially she because she is hard of hearing     - COVID  infection unclear time of exposure   due to recent changes in status : will give remdesivir   no indication for dexa   cont to monitor   ID consult     hx of bipolar : has nt been on meds and does not seem to be actively psychotic   consider psych     FTT : ivf     JAYLA : monitor  improved     d/w HCP [facility]  will fu final decision but does not seem to want her to be resuscitated ..had not signed papers in past

## 2023-05-09 NOTE — CONSULT NOTE ADULT - SUBJECTIVE AND OBJECTIVE BOX
HPI:   Patient is a 72y female with history of thyroid cancer, bipolar d/o, lives alone and by report has been very withdrawn, very poor po intake of late. She tested positive for covid and has pyuria with ecoli in the urine. I cannot get a clear history or ros from her.     REVIEW OF SYSTEMS:  All other review of systems negative (Comprehensive ROS)    PAST MEDICAL & SURGICAL HISTORY:  Hypothyroid      Thyroid cancer      History of thyroid surgery          Allergies    No Known Allergies    Intolerances        Antimicrobials Day #  :1  cefTRIAXone   IVPB 1000 milliGRAM(s) IV Intermittent every 24 hours  remdesivir  IVPB   IV Intermittent     Other Medications:  chlorhexidine 2% Cloths 1 Application(s) Topical daily  enoxaparin Injectable 40 milliGRAM(s) SubCutaneous every 12 hours  levothyroxine 112 MICROGram(s) Oral daily  sodium chloride 0.9%. 1000 milliLiter(s) IV Continuous <Continuous>      FAMILY HISTORY:      SOCIAL HISTORY:  Smoking: [ ]Yes [ x]No  ETOH: [ ]Yes x[ ]No  Drug Use: [ ]Yes [ ]xNo   [ ] Single[ x]    T(F): 98.1 (23 @ 12:32), Max: 98.2 (23 @ 08:57)  HR: 91 (23 @ 12:32)  BP: 146/84 (23 @ 12:32)  RR: 18 (23 @ 12:32)  SpO2: 100% (23 @ 12:32)  Wt(kg): --    PHYSICAL EXAM:  General: alert, no acute distress, very weak, very soft spoken, cachectic  Eyes:  anicteric, no conjunctival injection, no discharge  Oropharynx: no lesions or injection 	  Neck: supple, without adenopathy  Lungs: clear to auscultation  Heart: regular rate and rhythm; no murmur, rubs or gallops  Abdomen: soft, nondistended, nontender, without mass or organomegaly  Skin: no lesions  Extremities: no clubbing, cyanosis, or edema  Neurologic: awake, slow to respond, moves all extremities  back with covered ulcer  LAB RESULTS:                        17.0   6.85  )-----------( 227      ( 09 May 2023 07:35 )             49.5         152<H>  |  110<H>  |  20  ----------------------------<  99  3.5   |  26  |  1.01    Ca    10.9<H>      09 May 2023 07:33  Phos  2.4       Mg     2.5         TPro  8.0  /  Alb  4.9  /  TBili  0.4  /  DBili  <0.1  /  AST  24  /  ALT  22  /  AlkPhos  89      LIVER FUNCTIONS - ( 09 May 2023 07:33 )  Alb: 4.9 g/dL / Pro: 8.0 g/dL / ALK PHOS: 89 U/L / ALT: 22 U/L / AST: 24 U/L / GGT: x           Urinalysis Basic - ( 08 May 2023 12:29 )    Color: Colorless / Appearance: Slightly Turbid / S.008 / pH: x  Gluc: x / Ketone: Negative  / Bili: Negative / Urobili: Negative   Blood: x / Protein: Trace / Nitrite: Positive   Leuk Esterase: Large / RBC: 3 /hpf /  /HPF   Sq Epi: x / Non Sq Epi: x / Bacteria: Many        MICROBIOLOGY:  RECENT CULTURES:   @ 12:29 Clean Catch Clean Catch (Midstream)     >100,000 CFU/ml Escherichia coli            RADIOLOGY REVIEWED:      < from: CT Head No Cont (23 @ 15:39) >  ACC: [id number] EXAM:  CT BRAIN   ORDERED BY: COLLIN FLEMING     PROCEDURE DATE:  2023          INTERPRETATION:  CT OF THE HEAD WITHOUT CONTRAST    CLINICAL INDICATION: difficulty word finding.    TECHNIQUE: Volumetric CT acquisition was performed through the brain and   reviewed using brain and bone window technique.      COMPARISON: No prior studies have been submitted for comparison.    FINDINGS:    The ventricular and sulcal size and configuration is age appropriate.     There is no acute loss of gray-white differentiation. There are mild   patchy areas of hypodensity in the periventricular and subcortical white   matter which are likely related to chronic microangiopathic changes.    There is no evidence of mass effect, midline shift, acute intracranial   hemorrhage, or extra-axial collections.     The calvarium is intact. The paranasal sinuses are clear.The mastoid air   cells are predominantly clear. The orbits are unremarkable.      IMPRESSION:  No acute intracranial hemorrhage or acute territorial infarction.    --- End of Report ---      < end of copied text >    < from: Xray Chest 1 View- PORTABLE-Urgent (23 @ 12:12) >    ACC: [id number] EXAM:  XR CHEST PORTABLE URGENT 1V   ORDERED BY:  YELENA MON     PROCEDURE DATE:  2023          INTERPRETATION:  CLINICAL INDICATION:  Altered. Not eating.    COMPARISON: None    TECHNIQUE: Frontal radiograph of the chest.    FINDINGS:    Cardiac/mediastinum/hilum: Heart size is within normal limits.    Lung parenchyma/Pleura: The lungs are clear. There is no pleural   effusion. There is no pneumothorax.    Skeleton/soft tissues: No acute osseous abnormalities. Air-filled loops   of bowel in the left upper quadrant.    IMPRESSION:    Clear lungs.    --- End of Report ---      < end of copied text >    Impression:  72 year old woman with history of thyroid ca, psychiatric d/o admitted for FTT, poor po intake. She has covid and while not clear it is causing major symptoms, cannot really tell so agree with at least 3 days of tx. She is not hypoxic so no need for decadron. She has pyuria, I canot tell if she is having any gu symptoms so reasonable to treat for a cystitis. She has no fever, no leukocytosis so no support for a pyelo    Recommendations:  3 d of rdv  monitor cr, liver, sats  dvt prophylaxis  ceftriaxone for now , await ecoli sensitivity  psychiatry consult  dietician consult  consider ct cap given history of thyroid ca and current cachexia

## 2023-05-10 DIAGNOSIS — E03.9 HYPOTHYROIDISM, UNSPECIFIED: ICD-10-CM

## 2023-05-10 DIAGNOSIS — U07.1 COVID-19: ICD-10-CM

## 2023-05-10 LAB
-  AMIKACIN: SIGNIFICANT CHANGE UP
-  AMOXICILLIN/CLAVULANIC ACID: SIGNIFICANT CHANGE UP
-  AMPICILLIN/SULBACTAM: SIGNIFICANT CHANGE UP
-  AMPICILLIN: SIGNIFICANT CHANGE UP
-  AZTREONAM: SIGNIFICANT CHANGE UP
-  CEFAZOLIN: SIGNIFICANT CHANGE UP
-  CEFEPIME: SIGNIFICANT CHANGE UP
-  CEFOXITIN: SIGNIFICANT CHANGE UP
-  CEFTRIAXONE: SIGNIFICANT CHANGE UP
-  CEFUROXIME: SIGNIFICANT CHANGE UP
-  CIPROFLOXACIN: SIGNIFICANT CHANGE UP
-  ERTAPENEM: SIGNIFICANT CHANGE UP
-  GENTAMICIN: SIGNIFICANT CHANGE UP
-  IMIPENEM: SIGNIFICANT CHANGE UP
-  LEVOFLOXACIN: SIGNIFICANT CHANGE UP
-  MEROPENEM: SIGNIFICANT CHANGE UP
-  NITROFURANTOIN: SIGNIFICANT CHANGE UP
-  PIPERACILLIN/TAZOBACTAM: SIGNIFICANT CHANGE UP
-  TOBRAMYCIN: SIGNIFICANT CHANGE UP
-  TRIMETHOPRIM/SULFAMETHOXAZOLE: SIGNIFICANT CHANGE UP
ALBUMIN SERPL ELPH-MCNC: 4.2 G/DL — SIGNIFICANT CHANGE UP (ref 3.3–5)
ALP SERPL-CCNC: 86 U/L — SIGNIFICANT CHANGE UP (ref 40–120)
ALT FLD-CCNC: 31 U/L — SIGNIFICANT CHANGE UP (ref 10–45)
ANION GAP SERPL CALC-SCNC: 12 MMOL/L — SIGNIFICANT CHANGE UP (ref 5–17)
AST SERPL-CCNC: 46 U/L — HIGH (ref 10–40)
BILIRUB DIRECT SERPL-MCNC: <0.1 MG/DL — SIGNIFICANT CHANGE UP (ref 0–0.3)
BILIRUB INDIRECT FLD-MCNC: >0.3 MG/DL — SIGNIFICANT CHANGE UP (ref 0.2–1)
BILIRUB SERPL-MCNC: 0.4 MG/DL — SIGNIFICANT CHANGE UP (ref 0.2–1.2)
BUN SERPL-MCNC: 25 MG/DL — HIGH (ref 7–23)
CALCIUM SERPL-MCNC: 10.6 MG/DL — HIGH (ref 8.4–10.5)
CHLORIDE SERPL-SCNC: 111 MMOL/L — HIGH (ref 96–108)
CO2 SERPL-SCNC: 28 MMOL/L — SIGNIFICANT CHANGE UP (ref 22–31)
CREAT SERPL-MCNC: 1.68 MG/DL — HIGH (ref 0.5–1.3)
CREAT SERPL-MCNC: 1.74 MG/DL — HIGH (ref 0.5–1.3)
CULTURE RESULTS: SIGNIFICANT CHANGE UP
EGFR: 30 ML/MIN/1.73M2 — LOW
EGFR: 32 ML/MIN/1.73M2 — LOW
GLUCOSE SERPL-MCNC: 135 MG/DL — HIGH (ref 70–99)
INR BLD: 1.09 RATIO — SIGNIFICANT CHANGE UP (ref 0.88–1.16)
METHOD TYPE: SIGNIFICANT CHANGE UP
MRSA PCR RESULT.: SIGNIFICANT CHANGE UP
ORGANISM # SPEC MICROSCOPIC CNT: SIGNIFICANT CHANGE UP
ORGANISM # SPEC MICROSCOPIC CNT: SIGNIFICANT CHANGE UP
POTASSIUM SERPL-MCNC: 4.6 MMOL/L — SIGNIFICANT CHANGE UP (ref 3.5–5.3)
POTASSIUM SERPL-SCNC: 4.6 MMOL/L — SIGNIFICANT CHANGE UP (ref 3.5–5.3)
PROT SERPL-MCNC: 7.3 G/DL — SIGNIFICANT CHANGE UP (ref 6–8.3)
PROTHROM AB SERPL-ACNC: 12.6 SEC — SIGNIFICANT CHANGE UP (ref 10.5–13.4)
S AUREUS DNA NOSE QL NAA+PROBE: SIGNIFICANT CHANGE UP
SODIUM SERPL-SCNC: 151 MMOL/L — HIGH (ref 135–145)
SPECIMEN SOURCE: SIGNIFICANT CHANGE UP

## 2023-05-10 RX ORDER — CEFUROXIME AXETIL 250 MG
250 TABLET ORAL EVERY 12 HOURS
Refills: 0 | Status: COMPLETED | OUTPATIENT
Start: 2023-05-10 | End: 2023-05-11

## 2023-05-10 RX ORDER — POLYETHYLENE GLYCOL 3350 17 G/17G
17 POWDER, FOR SOLUTION ORAL ONCE
Refills: 0 | Status: COMPLETED | OUTPATIENT
Start: 2023-05-10 | End: 2023-05-10

## 2023-05-10 RX ORDER — ARIPIPRAZOLE 15 MG/1
2 TABLET ORAL
Refills: 0 | Status: DISCONTINUED | OUTPATIENT
Start: 2023-05-10 | End: 2023-05-12

## 2023-05-10 RX ADMIN — Medication 112 MICROGRAM(S): at 05:07

## 2023-05-10 RX ADMIN — Medication 250 MILLIGRAM(S): at 17:18

## 2023-05-10 RX ADMIN — ARIPIPRAZOLE 2 MILLIGRAM(S): 15 TABLET ORAL at 22:12

## 2023-05-10 RX ADMIN — CHLORHEXIDINE GLUCONATE 1 APPLICATION(S): 213 SOLUTION TOPICAL at 11:33

## 2023-05-10 RX ADMIN — Medication 0.5 MILLIGRAM(S): at 22:17

## 2023-05-10 RX ADMIN — REMDESIVIR 200 MILLIGRAM(S): 5 INJECTION INTRAVENOUS at 11:37

## 2023-05-10 RX ADMIN — ENOXAPARIN SODIUM 40 MILLIGRAM(S): 100 INJECTION SUBCUTANEOUS at 17:18

## 2023-05-10 RX ADMIN — ENOXAPARIN SODIUM 40 MILLIGRAM(S): 100 INJECTION SUBCUTANEOUS at 05:16

## 2023-05-10 RX ADMIN — SENNA PLUS 2 TABLET(S): 8.6 TABLET ORAL at 22:12

## 2023-05-10 NOTE — PROGRESS NOTE ADULT - ASSESSMENT
72-year-old female with history of bipolar disorder and dementia ?   hypothyroidism s/p thyroidectomy ( had thyroid cancer )   comes into the ED via EMS for failure to thrive.  Spoke with her niece,     Mylene   at [phone], who lives in [place] and is the patient's healthcare proxy. pt is a  , lives by herself ... her neighbor would take her food shopping.belongs to [employer] who also help.   she is incoherent and " withdrawn" and confused...  Lost her " common sense"   was not eating or drinking and lost 20lbs   She used to live with her  that he passed away in September 2020.  She was initially depressed for a brief period of time but returned to her baseline and was active until a month ago.  Her niece reports she used to be on lithium for her bipolar but has not been treated for the past 8 years.  she also reports she used to be on valium for " extreme insomnia " which pt had stopped for " sometime...     Currently in the emergency department patient does not complain of any pain or discomfort.  She states she has a decreased appetite and does not have any pain, nausea, vomiting or inability to eat.  Patient is not able to provide much of the history and she keeps stating she feels out of it and is unable to concentrate on anything.    pt was found to have COVID positve RVP.. a month ago she had fever , vomitting..    pt is not able to provide any information  partially she because she is hard of hearing     - COVID  infection unclear time of exposure   due to recent changes in status : will give remdesivir   no indication for dexa   cont to monitor   ID consulted  appreciate input     abd distention ; check bldder scan    uti ; complete ctx     hx of bipolar : has nt been on meds and does not seem to be actively psychotic   consider psych     FTT : ivf     JAYLA : monitor  improved     d/w HCP G[facility]  will fu final decision but does not seem to want her to be resuscitated ..had not signed papers in past

## 2023-05-10 NOTE — BH CONSULTATION LIAISON ASSESSMENT NOTE - DESCRIPTION
Former cigaret smoker. No alcohol or illicit drug abuse.    2 years ago and she took care of him (Parkinson's)  No children.

## 2023-05-10 NOTE — BH CONSULTATION LIAISON ASSESSMENT NOTE - CURRENT MEDICATION
MEDICATIONS  (STANDING):  cefuroxime   Tablet 250 milliGRAM(s) Oral every 12 hours  chlorhexidine 2% Cloths 1 Application(s) Topical daily  enoxaparin Injectable 40 milliGRAM(s) SubCutaneous every 12 hours  levothyroxine 112 MICROGram(s) Oral daily  polyethylene glycol 3350 17 Gram(s) Oral once  remdesivir  IVPB   IV Intermittent   remdesivir  IVPB 100 milliGRAM(s) IV Intermittent every 24 hours  senna 2 Tablet(s) Oral at bedtime  sodium chloride 0.9%. 1000 milliLiter(s) (80 mL/Hr) IV Continuous <Continuous>    MEDICATIONS  (PRN):

## 2023-05-10 NOTE — BH CONSULTATION LIAISON ASSESSMENT NOTE - SUMMARY
72 year old WF with chronic history of Bipolar Disorder. Off Lithium for years. Now with 6 week change in mental status. Suspect a delirium due to Covid, dehydration, ?hyperthyroidism.   Would avoid Lithium due to thyroid disease. A neuroleptic may help with her paranoia.

## 2023-05-10 NOTE — BH CONSULTATION LIAISON ASSESSMENT NOTE - OTHER PAST PSYCHIATRIC HISTORY (INCLUDE DETAILS REGARDING ONSET, COURSE OF ILLNESS, INPATIENT/OUTPATIENT TREATMENT)
Psychiatrically hospitalized once in her mid20s for [place]. No psychiatric hospitalizations since. Lithium was stopped at the recommendation of her  as the pt. had thyroid cancer. She never worked and has been on psychiatric disability. Has not seen a psychiatrist or psychotherapist for years. Never suicidal or violent per the niece.

## 2023-05-10 NOTE — CONSULT NOTE ADULT - PROBLEM SELECTOR RECOMMENDATION 9
Will get full thyroid panel  Will continue current Synthroid dose, will FU.  Suggest to repeat thyroid function test in 4-6 weeks. Outpatient follow up.

## 2023-05-10 NOTE — BH CONSULTATION LIAISON ASSESSMENT NOTE - RISK ASSESSMENT
Admitting MD at bedside, instructed to hold Dilaudid at this time.  Will give Toradol first. No past suicide attempts. No plans. No access to guns.

## 2023-05-10 NOTE — PROVIDER CONTACT NOTE (OTHER) - ASSESSMENT
Patient alert and oriented x 2-3. Confused to situation and forgetful. RN found patient with small episode of emesis during hourly rounding. Emesis observed to be food particles. Vitals stable. Patient states she is not short of breath, in pain, or nauseous at the moment. Patient states she was nauseous during episode of emesis. Patient states she does not want any medication for nausea when offered by RN. Patient alert and oriented x 2-3. Confused to situation and forgetful. RN found patient with small episode of emesis during hourly rounding. Emesis observed to be food particles. Abdomen distended and firm. Patient has not had BM since admission and unknown date of last BM since patient is poor historian. Vitals stable. Patient states she is not short of breath, in pain, or nauseous at the moment. Patient states she was nauseous during episode of emesis. Patient states she does not want any medication for nausea when offered by RN.

## 2023-05-10 NOTE — PROGRESS NOTE ADULT - SUBJECTIVE AND OBJECTIVE BOX
CC: f/u for  covid and uti  Patient reports    REVIEW OF SYSTEMS:  All other review of systems negative (Comprehensive ROS)    Antimicrobials Day #  2  cefTRIAXone   IVPB 1000 milliGRAM(s) IV Intermittent every 24 hours  remdesivir  IVPB   IV Intermittent   remdesivir  IVPB 100 milliGRAM(s) IV Intermittent every 24 hours    Other Medications Reviewed    T(F): 98.1 (05-10-23 @ 13:02), Max: 98.6 (05-10-23 @ 00:30)  HR: 86 (05-10-23 @ 13:02)  BP: 154/98 (05-10-23 @ 13:02)  RR: 18 (05-10-23 @ 13:02)  SpO2: 96% (05-10-23 @ 13:02)  Wt(kg): --    PHYSICAL EXAM:  General: alert, no acute distress  Eyes:  anicteric, no conjunctival injection, no discharge  Oropharynx: no lesions or injection 	  Neck: supple, without adenopathy  Lungs: clear to auscultation  Heart: regular rate and rhythm; no murmur, rubs or gallops  Abdomen: soft,bladder very distended, nontender, without mass or organomegaly  Skin: no lesions  Extremities: no clubbing, cyanosis, or edema  Neurologic: alert, cannot express herself, moves all extremities    LAB RESULTS:                        17.0   6.85  )-----------( 227      ( 09 May 2023 07:35 )             49.5     05-10    151<H>  |  111<H>  |  25<H>  ----------------------------<  135<H>  4.6   |  28  |  1.68<H>    Ca    10.6<H>      10 May 2023 09:09    TPro  7.3  /  Alb  4.2  /  TBili  0.4  /  DBili  <0.1  /  AST  46<H>  /  ALT  31  /  AlkPhos  86  05-10    LIVER FUNCTIONS - ( 10 May 2023 09:09 )  Alb: 4.2 g/dL / Pro: 7.3 g/dL / ALK PHOS: 86 U/L / ALT: 31 U/L / AST: 46 U/L / GGT: x             MICROBIOLOGY:  RECENT CULTURES:  05-08 @ 12:29 Clean Catch Clean Catch (Midstream) Escherichia coli    >100,000 CFU/ml Escherichia coli          RADIOLOGY REVIEWED:      < from: CT Head No Cont (05.08.23 @ 15:39) >    ACC: [id number] EXAM:  CT BRAIN   ORDERED BY: COLLIN FLEMING     PROCEDURE DATE:  05/08/2023          INTERPRETATION:  CT OF THE HEAD WITHOUT CONTRAST    CLINICAL INDICATION: difficulty word finding.    TECHNIQUE: Volumetric CT acquisition was performed through the brain and   reviewed using brain and bone window technique.      COMPARISON: No prior studies have been submitted for comparison.    FINDINGS:    The ventricular and sulcal size and configuration is age appropriate.     There is no acute loss of gray-white differentiation. There are mild   patchy areas of hypodensity in the periventricular and subcortical white   matter which are likely related to chronic microangiopathic changes.    There is no evidence of mass effect, midline shift, acute intracranial   hemorrhage, or extra-axial collections.     The calvarium is intact. The paranasal sinuses are clear.The mastoid air   cells are predominantly clear. The orbits are unremarkable.      IMPRESSION:  No acute intracranial hemorrhage or acute territorial infarction.    --- End of Report ---    < end of copied text >          Assessment: Patient with h/o psychiatric d/o admitted with FTT, very poor po intake, cachectic, tested positive for covid but not hypoxic and also with uti and urinary retention. Elevated cr likely from retention, doubt from rdv    Plan:  continue remdesivir for 1 more day  place de guzman  change ctx to ceftin for one more day  psychiatry evaluation

## 2023-05-10 NOTE — PROGRESS NOTE ADULT - SUBJECTIVE AND OBJECTIVE BOX
Date of service: 05-10-23 @ 14:23      Patient is a 72y old  Female who presents with a chief complaint of covid (10 May 2023 13:30)                                                               INTERVAL HPI/OVERNIGHT EVENTS:    REVIEW OF SYSTEMS:  denies any complainst of cp s/bg                                                                                                                                                                                                                                                                   Medications:  MEDICATIONS  (STANDING):  cefTRIAXone   IVPB 1000 milliGRAM(s) IV Intermittent every 24 hours  chlorhexidine 2% Cloths 1 Application(s) Topical daily  enoxaparin Injectable 40 milliGRAM(s) SubCutaneous every 12 hours  levothyroxine 112 MICROGram(s) Oral daily  polyethylene glycol 3350 17 Gram(s) Oral once  remdesivir  IVPB   IV Intermittent   remdesivir  IVPB 100 milliGRAM(s) IV Intermittent every 24 hours  senna 2 Tablet(s) Oral at bedtime  sodium chloride 0.9%. 1000 milliLiter(s) (80 mL/Hr) IV Continuous <Continuous>    MEDICATIONS  (PRN):       Allergies    No Known Allergies    Intolerances      Vital Signs Last 24 Hrs  T(C): 36.7 (10 May 2023 13:02), Max: 37 (10 May 2023 00:30)  T(F): 98.1 (10 May 2023 13:02), Max: 98.6 (10 May 2023 00:30)  HR: 86 (10 May 2023 13:02) (76 - 100)  BP: 154/98 (10 May 2023 13:02) (154/96 - 166/94)  BP(mean): --  RR: 18 (10 May 2023 13:02) (18 - 18)  SpO2: 96% (10 May 2023 13:02) (96% - 99%)    Parameters below as of 10 May 2023 13:02  Patient On (Oxygen Delivery Method): room air      CAPILLARY BLOOD GLUCOSE          05-09 @ 07:01  -  05-10 @ 07:00  --------------------------------------------------------  IN: 2140 mL / OUT: 900 mL / NET: 1240 mL    05-10 @ 07:01  -  05-10 @ 14:23  --------------------------------------------------------  IN: 380 mL / OUT: 2300 mL / NET: -1920 mL      Physical Exam:    Daily     Daily   General:  nad   HEENT:  Nonicteric, PERRLA  CV:  RRR, S1S2   Lungs:  CTA B/L, no wheezes, rales, rhonchi  Abdomen:  Soft, mild distention   tenderness   Extremities:  2+ pulses, no c/c, no edema                                                                                                                                                                                                                                                                                            LABS:                               17.0   6.85  )-----------( 227      ( 09 May 2023 07:35 )             49.5                      05-10    151<H>  |  111<H>  |  25<H>  ----------------------------<  135<H>  4.6   |  28  |  1.68<H>    Ca    10.6<H>      10 May 2023 09:09    TPro  7.3  /  Alb  4.2  /  TBili  0.4  /  DBili  <0.1  /  AST  46<H>  /  ALT  31  /  AlkPhos  86  05-10                       RADIOLOGY & ADDITIONAL TESTS         I personally reviewed: [  ]EKG   [  ]CXR    [  ] CT      A/P:         Discussed with :     Becky consultants' Notes   Time spent :

## 2023-05-10 NOTE — BH CONSULTATION LIAISON ASSESSMENT NOTE - NSBHCONSULTRECOMMENDOTHER_PSY_A_CORE FT
Abilify 2mg p.o. qa.m. (hold if QTc is 500ms or greater)  Lorazepam 0.50mg p.o. qhs (hold if any sign of respiratory insufficiency)  Endocrine follow up  Follow QTc  No Lithium (abnormal TSH, dehydrated)

## 2023-05-10 NOTE — BH CONSULTATION LIAISON ASSESSMENT NOTE - NSBHCHARTREVIEWLAB_PSY_A_CORE FT
CBC Full  -  ( 09 May 2023 07:35 )  WBC Count : 6.85 K/uL  Hemoglobin : 17.0 g/dL  Hematocrit : 49.5 %  Platelet Count - Automated : 227 K/uL  Mean Cell Volume : 92.9 fl  Mean Cell Hemoglobin : 31.9 pg  Mean Cell Hemoglobin Concentration : 34.3 gm/dL  Auto Neutrophil # : 5.57 K/uL  Auto Lymphocyte # : 0.78 K/uL  Auto Monocyte # : 0.43 K/uL  Auto Eosinophil # : 0.01 K/uL  Auto Basophil # : 0.04 K/uL  Auto Neutrophil % : 81.3 %  Auto Lymphocyte % : 11.4 %  Auto Monocyte % : 6.3 %  Auto Eosinophil % : 0.1 %  Auto Basophil % : 0.6 %    05-10    151<H>  |  111<H>  |  25<H>  ----------------------------<  135<H>  4.6   |  28  |  1.68<H>    Ca    10.6<H>      10 May 2023 09:09   B12 1428, Folate greater than 20, TSH 0.06    TPro  7.3  /  Alb  4.2  /  TBili  0.4  /  DBili  <0.1  /  AST  46<H>  /  ALT  31  /  AlkPhos  86  05-10 B12 1428   < from: CT Head No Cont (05.08.23 @ 15:39) >      FINDINGS:    The ventricular and sulcal size and configuration is age appropriate.     There is no acute loss of gray-white differentiation. There are mild   patchy areas of hypodensity in the periventricular and subcortical white   matter which are likely related to chronic microangiopathic changes.    There is no evidence of mass effect, midline shift, acute intracranial   hemorrhage, or extra-axial collections.     The calvarium is intact. The paranasal sinuses are clear.The mastoid air   cells are predominantly clear. The orbits are unremarkable.      IMPRESSION:    < end of copied text >

## 2023-05-10 NOTE — CONSULT NOTE ADULT - PROBLEM SELECTOR RECOMMENDATION 2
on remdesivr, ID follow up   Suggest to continue medications, monitoring, FU primary team recommendations.

## 2023-05-10 NOTE — PHYSICAL THERAPY INITIAL EVALUATION ADULT - PERTINENT HX OF CURRENT PROBLEM, REHAB EVAL
72-year-old female with history of bipolar disorder and dementia ?, hypothyroidism s/p thyroidectomy ( had thyroid cancer ) comes into the ED via EMS for failure to thrive. CT head (-). ECG 5/8, PACs, low voltage QRS. CXR (-).

## 2023-05-10 NOTE — BH CONSULTATION LIAISON ASSESSMENT NOTE - HPI (INCLUDE ILLNESS QUALITY, SEVERITY, DURATION, TIMING, CONTEXT, MODIFYING FACTORS, ASSOCIATED SIGNS AND SYMPTOMS)
The pt. is a 72 year old WF  (  2 years ago). she was admitted here on 23 for weakness, failure to thrive (niece says she lost 32 pounds over the past 2 months). Found to be Covid positive. Staff say she is not agitated but is a poor historian with word finding difficulty. For the latter she had a CT of head which was wnl. Niece reports the pt. suffers from Bipolar Disorder since her mid20s. She took Lithium for years but stopped 8 years ago as her  wanted her off it. Also takes Valium "on and off" but not daily and dose unknown to niece. For the past 6 weeks the pt. has had a change in mental status including word finding trouble, not being able to finish sentences, confused e.g. she does not remember paying bills. Niece says that pt.'s memory was "good" until 6 weeks ago though niece admits to occasional decreased memory due to age. The pt. was independent in ADLs until 6 weeks ago. Her   2 years ago and is living alone since (niece is in [place] caring for her mother with dementia). She takes Valium for chronic anxiety.

## 2023-05-10 NOTE — CONSULT NOTE ADULT - ASSESSMENT
Assessment  72y female with history of thyroid cancer, bipolar d/o, lives alone and by report has been very withdrawn, very poor po intake and has recent weight loss.   Hypothyroidism: On Synthroid 112 mcg po daily, TSH 0.06, pending FT4. Hx of Thyroidectomy due to thyroid cancer.   COVID+: supportive care, on remdesivir. On isolation         Discussed plan and management wit Dr Blanca Rios MD  Cell: [phone]  Office: [phone]               Assessment  72y female with history of thyroid cancer, bipolar d/o, lives alone and by report has been very withdrawn, very poor po intake  and has recent weight loss.   Hypothyroidism: On Synthroid 112 mcg po daily, TSH 0.06, pending FT4. Hx of Thyroidectomy due to thyroid cancer.   COVID+: supportive care, on remdesivir. On isolation         Discussed plan and management wit Dr Blanca Rios MD  Cell: [phone]  Office: [phone]

## 2023-05-10 NOTE — PHYSICAL THERAPY INITIAL EVALUATION ADULT - NSPTDISCHREC_GEN_A_CORE
Quality 431: Preventive Care And Screening: Unhealthy Alcohol Use - Screening: Patient identified as an unhealthy alcohol user when screened for unhealthy alcohol use using a systematic screening method and received brief counseling Quality 402: Tobacco Use And Help With Quitting Among Adolescents: Patient screened for tobacco and never smoked Quality 130: Documentation Of Current Medications In The Medical Record: Current Medications Documented Detail Level: Detailed Quality 226: Preventive Care And Screening: Tobacco Use: Screening And Cessation Intervention: Patient screened for tobacco use and is an ex/non-smoker [facility]

## 2023-05-10 NOTE — BH CONSULTATION LIAISON ASSESSMENT NOTE - NSBHCHARTREVIEWVS_PSY_A_CORE FT
Vital Signs Last 24 Hrs  T(C): 36.7 (10 May 2023 13:02), Max: 37 (10 May 2023 00:30)  T(F): 98.1 (10 May 2023 13:02), Max: 98.6 (10 May 2023 00:30)  HR: 86 (10 May 2023 13:02) (76 - 100)  BP: 154/98 (10 May 2023 13:02) (154/96 - 166/94)  BP(mean): --  RR: 18 (10 May 2023 13:02) (18 - 18)  SpO2: 96% (10 May 2023 13:02) (96% - 99%)    Parameters below as of 10 May 2023 13:02  Patient On (Oxygen Delivery Method): room air

## 2023-05-10 NOTE — BH CONSULTATION LIAISON ASSESSMENT NOTE - DIFFERENTIAL
Dementia  Bipolar disorder, depressed  Psychosis from Valium withdrawal manifesting with paranoia, hypertension, tachycardia

## 2023-05-11 LAB
ALBUMIN SERPL ELPH-MCNC: 3.7 G/DL — SIGNIFICANT CHANGE UP (ref 3.3–5)
ALP SERPL-CCNC: 78 U/L — SIGNIFICANT CHANGE UP (ref 40–120)
ALT FLD-CCNC: 28 U/L — SIGNIFICANT CHANGE UP (ref 10–45)
ANION GAP SERPL CALC-SCNC: 11 MMOL/L — SIGNIFICANT CHANGE UP (ref 5–17)
AST SERPL-CCNC: 36 U/L — SIGNIFICANT CHANGE UP (ref 10–40)
BILIRUB DIRECT SERPL-MCNC: 0.1 MG/DL — SIGNIFICANT CHANGE UP (ref 0–0.3)
BILIRUB INDIRECT FLD-MCNC: 0.3 MG/DL — SIGNIFICANT CHANGE UP (ref 0.2–1)
BILIRUB SERPL-MCNC: 0.4 MG/DL — SIGNIFICANT CHANGE UP (ref 0.2–1.2)
BUN SERPL-MCNC: 29 MG/DL — HIGH (ref 7–23)
CALCIUM SERPL-MCNC: 9.7 MG/DL — SIGNIFICANT CHANGE UP (ref 8.4–10.5)
CHLORIDE SERPL-SCNC: 114 MMOL/L — HIGH (ref 96–108)
CO2 SERPL-SCNC: 26 MMOL/L — SIGNIFICANT CHANGE UP (ref 22–31)
CREAT SERPL-MCNC: 1.58 MG/DL — HIGH (ref 0.5–1.3)
CREAT SERPL-MCNC: 1.6 MG/DL — HIGH (ref 0.5–1.3)
EGFR: 34 ML/MIN/1.73M2 — LOW
EGFR: 34 ML/MIN/1.73M2 — LOW
GLUCOSE SERPL-MCNC: 102 MG/DL — HIGH (ref 70–99)
HCT VFR BLD CALC: 48.9 % — HIGH (ref 34.5–45)
HGB BLD-MCNC: 16.4 G/DL — HIGH (ref 11.5–15.5)
INR BLD: 1.13 RATIO — SIGNIFICANT CHANGE UP (ref 0.88–1.16)
MCHC RBC-ENTMCNC: 31.2 PG — SIGNIFICANT CHANGE UP (ref 27–34)
MCHC RBC-ENTMCNC: 33.5 GM/DL — SIGNIFICANT CHANGE UP (ref 32–36)
MCV RBC AUTO: 93 FL — SIGNIFICANT CHANGE UP (ref 80–100)
NRBC # BLD: 0 /100 WBCS — SIGNIFICANT CHANGE UP (ref 0–0)
PLATELET # BLD AUTO: 182 K/UL — SIGNIFICANT CHANGE UP (ref 150–400)
POTASSIUM SERPL-MCNC: 4.3 MMOL/L — SIGNIFICANT CHANGE UP (ref 3.5–5.3)
POTASSIUM SERPL-SCNC: 4.3 MMOL/L — SIGNIFICANT CHANGE UP (ref 3.5–5.3)
PROT SERPL-MCNC: 6.3 G/DL — SIGNIFICANT CHANGE UP (ref 6–8.3)
PROTHROM AB SERPL-ACNC: 13.1 SEC — SIGNIFICANT CHANGE UP (ref 10.5–13.4)
RBC # BLD: 5.26 M/UL — HIGH (ref 3.8–5.2)
RBC # FLD: 13.7 % — SIGNIFICANT CHANGE UP (ref 10.3–14.5)
SODIUM SERPL-SCNC: 151 MMOL/L — HIGH (ref 135–145)
T4 FREE SERPL-MCNC: 2.2 NG/DL — HIGH (ref 0.9–1.8)
THYROGLOB AB FLD IA-ACNC: <20 IU/ML — SIGNIFICANT CHANGE UP
THYROGLOB AB SERPL-ACNC: <20 IU/ML — SIGNIFICANT CHANGE UP
THYROGLOB SERPL-MCNC: <0.2 NG/ML — LOW (ref 1.6–59.9)
TSH SERPL-MCNC: 0.12 UIU/ML — LOW (ref 0.27–4.2)
WBC # BLD: 8.22 K/UL — SIGNIFICANT CHANGE UP (ref 3.8–10.5)
WBC # FLD AUTO: 8.22 K/UL — SIGNIFICANT CHANGE UP (ref 3.8–10.5)

## 2023-05-11 RX ORDER — LEVOTHYROXINE SODIUM 125 MCG
100 TABLET ORAL DAILY
Refills: 0 | Status: DISCONTINUED | OUTPATIENT
Start: 2023-05-12 | End: 2023-05-22

## 2023-05-11 RX ORDER — ASCORBIC ACID 60 MG
500 TABLET,CHEWABLE ORAL DAILY
Refills: 0 | Status: DISCONTINUED | OUTPATIENT
Start: 2023-05-11 | End: 2023-05-22

## 2023-05-11 RX ADMIN — Medication 112 MICROGRAM(S): at 05:40

## 2023-05-11 RX ADMIN — ENOXAPARIN SODIUM 40 MILLIGRAM(S): 100 INJECTION SUBCUTANEOUS at 17:36

## 2023-05-11 RX ADMIN — Medication 1 TABLET(S): at 21:20

## 2023-05-11 RX ADMIN — Medication 500 MILLIGRAM(S): at 21:20

## 2023-05-11 RX ADMIN — ARIPIPRAZOLE 2 MILLIGRAM(S): 15 TABLET ORAL at 21:19

## 2023-05-11 RX ADMIN — Medication 250 MILLIGRAM(S): at 05:41

## 2023-05-11 RX ADMIN — Medication 0.5 MILLIGRAM(S): at 21:20

## 2023-05-11 RX ADMIN — REMDESIVIR 200 MILLIGRAM(S): 5 INJECTION INTRAVENOUS at 11:01

## 2023-05-11 RX ADMIN — SENNA PLUS 2 TABLET(S): 8.6 TABLET ORAL at 21:19

## 2023-05-11 RX ADMIN — ENOXAPARIN SODIUM 40 MILLIGRAM(S): 100 INJECTION SUBCUTANEOUS at 05:40

## 2023-05-11 RX ADMIN — Medication 250 MILLIGRAM(S): at 17:35

## 2023-05-11 RX ADMIN — CHLORHEXIDINE GLUCONATE 1 APPLICATION(S): 213 SOLUTION TOPICAL at 12:22

## 2023-05-11 NOTE — PROGRESS NOTE ADULT - SUBJECTIVE AND OBJECTIVE BOX
Chief complaint  Patient is a 72y old  Female who presents with a chief complaint of "72-year-old female with history of bipolar disorder and dementia, hypothyroidism s/p thyroidectomy ( had thyroid cancer ), comes into the ED via EMS for failure to thrive." Presenting COVID-19+.     (11 May 2023 13:25)         Labs and Fingersticks  CAPILLARY BLOOD GLUCOSE          Anion Gap, Serum: 11 (05-11 @ 07:40)  Anion Gap, Serum: 12 (05-10 @ 09:09)      Calcium, Total Serum: 9.7 (05-11 @ 07:40)  Calcium, Total Serum: 10.6 *H* (05-10 @ 09:09)  Albumin, Serum: 3.7 (05-11 @ 07:40)  Albumin, Serum: 4.2 (05-10 @ 09:09)    Alanine Aminotransferase (ALT/SGPT): 28 (05-11 @ 07:40)  Alanine Aminotransferase (ALT/SGPT): 31 (05-10 @ 09:09)  Alkaline Phosphatase, Serum: 78 (05-11 @ 07:40)  Alkaline Phosphatase, Serum: 86 (05-10 @ 09:09)  Aspartate Aminotransferase (AST/SGOT): 36 (05-11 @ 07:40)  Aspartate Aminotransferase (AST/SGOT): 46 *H* (05-10 @ 09:09)        05-11    151<H>  |  114<H>  |  29<H>  ----------------------------<  102<H>  4.3   |  26  |  1.60<H>    Ca    9.7      11 May 2023 07:40    TPro  6.3  /  Alb  3.7  /  TBili  0.4  /  DBili  0.1  /  AST  36  /  ALT  28  /  AlkPhos  78  05-11                        16.4   8.22  )-----------( 182      ( 11 May 2023 07:40 )             48.9     Medications  MEDICATIONS  (STANDING):  ARIPiprazole 2 milliGRAM(s) Oral <User Schedule>  ascorbic acid 500 milliGRAM(s) Oral daily  cefuroxime   Tablet 250 milliGRAM(s) Oral every 12 hours  chlorhexidine 2% Cloths 1 Application(s) Topical daily  enoxaparin Injectable 40 milliGRAM(s) SubCutaneous every 12 hours  levothyroxine 112 MICROGram(s) Oral daily  LORazepam     Tablet 0.5 milliGRAM(s) Oral at bedtime  multivitamin 1 Tablet(s) Oral daily  remdesivir  IVPB   IV Intermittent   remdesivir  IVPB 100 milliGRAM(s) IV Intermittent every 24 hours  senna 2 Tablet(s) Oral at bedtime  sodium chloride 0.9%. 1000 milliLiter(s) (80 mL/Hr) IV Continuous <Continuous>      Physical Exam  General: Patient comfortable in bed   Vital Signs Last 12 Hrs  T(F): 98.1 (05-11-23 @ 13:33), Max: 98.7 (05-11-23 @ 09:22)  HR: 93 (05-11-23 @ 13:33) (87 - 97)  BP: 155/106 (05-11-23 @ 13:33) (130/85 - 155/106)  BP(mean): --  RR: 18 (05-11-23 @ 13:33) (18 - 18)  SpO2: 99% (05-11-23 @ 13:33) (97% - 100%)    CVS: S1S2   Respiratory: No wheezing, no crepitations  GI: Abdomen soft, bowel sounds positive  Musculoskeletal:  moves all extremities  : Voiding        Chief complaint  Patient is a 72y old  Female who presents with a chief complaint of "72-year-old female with history of bipolar disorder and dementia, hypothyroidism s/p thyroidectomy ( had thyroid cancer ), comes into the ED via EMS for failure to thrive." Presenting COVID-19+.     (11 May 2023 13:25)         Labs and Fingersticks  CAPILLARY BLOOD GLUCOSE          Anion Gap, Serum: 11 (05-11 @ 07:40)  Anion Gap, Serum: 12 (05-10 @ 09:09)      Calcium, Total Serum: 9.7 (05-11 @ 07:40)  Calcium, Total Serum: 10.6 *H* (05-10 @ 09:09)  Albumin, Serum: 3.7 (05-11 @ 07:40)  Albumin, Serum: 4.2 (05-10 @ 09:09)    Alanine Aminotransferase (ALT/SGPT): 28 (05-11 @ 07:40)  Alanine Aminotransferase (ALT/SGPT): 31 (05-10 @ 09:09)  Alkaline Phosphatase, Serum: 78 (05-11 @ 07:40)  Alkaline Phosphatase, Serum: 86 (05-10 @ 09:09)  Aspartate Aminotransferase (AST/SGOT): 36 (05-11 @ 07:40)  Aspartate Aminotransferase (AST/SGOT): 46 *H* (05-10 @ 09:09)        05-11    151<H>  |  114<H>  |  29<H>  ----------------------------<  102<H>  4.3   |  26  |  1.60<H>    Ca    9.7      11 May 2023 07:40    TPro  6.3  /  Alb  3.7  /  TBili  0.4  /  DBili  0.1  /  AST  36  /  ALT  28  /  AlkPhos  78  05-11                        16.4   8.22  )-----------( 182      ( 11 May 2023 07:40 )             48.9     Medications  MEDICATIONS  (STANDING):  ARIPiprazole 2 milliGRAM(s) Oral <User Schedule>  ascorbic acid 500 milliGRAM(s) Oral daily  cefuroxime   Tablet 250 milliGRAM(s) Oral every 12 hours  chlorhexidine 2% Cloths 1 Application(s) Topical daily  enoxaparin Injectable 40 milliGRAM(s) SubCutaneous every 12 hours  levothyroxine 112 MICROGram(s) Oral daily  LORazepam     Tablet 0.5 milliGRAM(s) Oral at bedtime  multivitamin 1 Tablet(s) Oral daily  remdesivir  IVPB   IV Intermittent   remdesivir  IVPB 100 milliGRAM(s) IV Intermittent every 24 hours  senna 2 Tablet(s) Oral at bedtime  sodium chloride 0.9%. 1000 milliLiter(s) (80 mL/Hr) IV Continuous <Continuous>      Physical Exam  General: Patient comfortable in bed   Vital Signs Last 12 Hrs  T(F): 98.1 (05-11-23 @ 13:33), Max: 98.7 (05-11-23 @ 09:22)  HR: 93 (05-11-23 @ 13:33) (87 - 97)  BP: 155/106 (05-11-23 @ 13:33) (130/85 - 155/106)  BP(mean): --  RR: 18 (05-11-23 @ 13:33) (18 - 18)  SpO2: 99% (05-11-23 @ 13:33) (97% - 100%)    CVS: S1S2   Respiratory: No wheezing, no crepitations  GI: Abdomen soft, bowel sounds positive  Musculoskeletal:  moves all extremities  : Voiding

## 2023-05-11 NOTE — DIETITIAN INITIAL EVALUATION ADULT - REASON FOR ADMISSION
"72-year-old female with history of bipolar disorder and dementia, hypothyroidism s/p thyroidectomy ( had thyroid cancer ), comes into the ED via EMS for failure to thrive." Presenting COVID-19+.

## 2023-05-11 NOTE — PROGRESS NOTE ADULT - ASSESSMENT
72-year-old female with history of bipolar disorder and dementia ?   hypothyroidism s/p thyroidectomy ( had thyroid cancer )   comes into the ED via EMS for failure to thrive.  Spoke with her niece,     Mylene   at [phone], who lives in [place] and is the patient's healthcare proxy. pt is a  , lives by herself ... her neighbor would take her food shopping.belongs to [employer] who also help.   she is incoherent and " withdrawn" and confused...  Lost her " common sense"   was not eating or drinking and lost 20lbs   She used to live with her  that he passed away in September 2020.  She was initially depressed for a brief period of time but returned to her baseline and was active until a month ago.  Her niece reports she used to be on lithium for her bipolar but has not been treated for the past 8 years.  she also reports she used to be on valium for " extreme insomnia " which pt had stopped for " sometime...     Currently in the emergency department patient does not complain of any pain or discomfort.  She states she has a decreased appetite and does not have any pain, nausea, vomiting or inability to eat.  Patient is not able to provide much of the history and she keeps stating she feels out of it and is unable to concentrate on anything.    pt was found to have COVID positve RVP.. a month ago she had fever , vomitting..    pt is not able to provide any information  partially she because she is hard of hearing     - COVID  infection unclear time of exposure   due to recent changes in status : will give remdesivir   no indication for dexa   cont to monitor   ID consulted  appreciate input     abd distention ; urinary retention   monitor and place foely per protocol     uti ; complete ctx     hx of bipolar : has nt been on meds and does not seem to be actively psychotic   consider psych     FTT : ivf     JAYLA : monitor  improved     breast lesion : per report ..pt refused exam . Chaperon  present at the time       d/w HCP GOC  will fu final decision but does not seem to want her to be resuscitated ..had not signed papers in past

## 2023-05-11 NOTE — BH CONSULTATION LIAISON PROGRESS NOTE - NSBHFUPINTERVALHXFT_PSY_A_CORE
Still paranoid but more oriented and improved HR and BP. The maximum CIWA score has been 1. Had urinary retention overnight. Started Abilify and Ativan. Eating better. Being treated with Penicillin as RPR is positive.  Still paranoid but more oriented and improved HR and BP. The maximum CIWA score has been 1. Had urinary retention overnight. Started Abilify and Ativan. Eating better.

## 2023-05-11 NOTE — PROGRESS NOTE ADULT - ASSESSMENT
Assessment  72y female with history of thyroid cancer, bipolar d/o, lives alone and by report has been very withdrawn, very poor po intake  and has recent weight loss.   Hypothyroidism: On Synthroid 112 mcg po daily, TSH 0.06, FT4 2.2. Now Hyperthyroid. Thyroxine adjusted, Hx of Thyroidectomy due to thyroid cancer.   COVID+: supportive care, on remdesivir. On isolation         Discussed plan and management wit Dr Blanca Riso MD  Cell: [phone]  Office: [phone]               Assessment  72y female with history of thyroid cancer, bipolar d/o, lives alone and by report has been very withdrawn, very poor  po intake  and has recent weight loss.   Hypothyroidism: On Synthroid 112 mcg po daily, TSH 0.06, FT4 2.2. Now Hyperthyroid. Thyroxine adjusted, Hx of Thyroidectomy due to thyroid cancer.   COVID+: supportive care, on remdesivir. On isolation         Discussed plan and management wit Dr Blanca Rios MD  Cell: [phone]  Office: [phone]

## 2023-05-11 NOTE — PROGRESS NOTE ADULT - SUBJECTIVE AND OBJECTIVE BOX
CC: f/u for  cystitis and covid  Patient reports  she knows where she is, is upset that she was not eating at home, feels people at home want to hurt her  REVIEW OF SYSTEMS:  All other review of systems negative (Comprehensive ROS)    Antimicrobials Day #  :  cefuroxime   Tablet 250 milliGRAM(s) Oral every 12 hours day 4/4  remdesivir  IVPB   IV Intermittent   remdesivir  IVPB 100 milliGRAM(s) IV Intermittent every 24 hours day 3    Other Medications Reviewed    T(F): 98.1 (05-11-23 @ 13:33), Max: 99 (05-11-23 @ 01:32)  HR: 93 (05-11-23 @ 13:33)  BP: 155/106 (05-11-23 @ 13:33)  RR: 18 (05-11-23 @ 13:33)  SpO2: 99% (05-11-23 @ 13:33)  Wt(kg): --    PHYSICAL EXAM:  General: alert, no acute distress  Eyes:  anicteric, no conjunctival injection, no discharge  Oropharynx: no lesions or injection 	  Neck: supple, without adenopathy  Lungs: clear to auscultation  Heart: regular rate and rhythm; no murmur, rubs or gallops  Abdomen: soft, nondistended, nontender, without mass or organomegaly  Skin: no lesions  Extremities: no clubbing, cyanosis, or edema  Neurologic: alert, oriented, moves all extremities    LAB RESULTS:                        16.4   8.22  )-----------( 182      ( 11 May 2023 07:40 )             48.9     05-11    151<H>  |  114<H>  |  29<H>  ----------------------------<  102<H>  4.3   |  26  |  1.60<H>    Ca    9.7      11 May 2023 07:40    TPro  6.3  /  Alb  3.7  /  TBili  0.4  /  DBili  0.1  /  AST  36  /  ALT  28  /  AlkPhos  78  05-11    LIVER FUNCTIONS - ( 11 May 2023 07:40 )  Alb: 3.7 g/dL / Pro: 6.3 g/dL / ALK PHOS: 78 U/L / ALT: 28 U/L / AST: 36 U/L / GGT: x             MICROBIOLOGY:  RECENT CULTURES:  05-08 @ 12:29 Clean Catch Clean Catch (Midstream) Escherichia coli    >100,000 CFU/ml Escherichia coli          RADIOLOGY REVIEWED:      < from: CT Head No Cont (05.08.23 @ 15:39) >    ACC: [id number] EXAM:  CT BRAIN   ORDERED BY: COLLIN FLEMING     PROCEDURE DATE:  05/08/2023          INTERPRETATION:  CT OF THE HEAD WITHOUT CONTRAST    CLINICAL INDICATION: difficulty word finding.    TECHNIQUE: Volumetric CT acquisition was performed through the brain and   reviewed using brain and bone window technique.      COMPARISON: No prior studies have been submitted for comparison.    FINDINGS:    The ventricular and sulcal size and configuration is age appropriate.     There is no acute loss of gray-white differentiation. There are mild   patchy areas of hypodensity in the periventricular and subcortical white   matter which are likely related to chronic microangiopathic changes.    There is no evidence of mass effect, midline shift, acute intracranial   hemorrhage, or extra-axial collections.     The calvarium is intact. The paranasal sinuses are clear.The mastoid air   cells are predominantly clear. The orbits are unremarkable.      IMPRESSION:  No acute intracranial hemorrhage or acute territorial infarction.    --- End of Report ---          < end of copied text >          Assessment:  Patient with history of bipolar, has been eating and drinking poorly of late so came to hospital . She was found to have mild covid so on rdv day 3 today, not hypoxic and also has cystitis now finished with a course of antibiotics and also found to have urinary retention and has a de guzman in place.   Plan:  finish ceftin and remdesivir today

## 2023-05-11 NOTE — DIETITIAN INITIAL EVALUATION ADULT - ETIOLOGY
related to inadequate protein-energy intake in the setting of bipolar disorder  related to increased physiological demand

## 2023-05-11 NOTE — BH CONSULTATION LIAISON PROGRESS NOTE - NSBHCONSULTRECOMMENDOTHER_PSY_A_CORE FT
Continue with standing Abilify 2mg qd and Lorazepam 0.50mg qhs  If after tx. with Penicillin, Abilify, and Ativan the mental status does not improve, get Neurology to do dementia workup   Continue with standing Abilify 2mg qd and Lorazepam 0.50mg qhs  If after tx. with Abilify and Ativan the mental status does not improve, get Neurology to do dementia workup  Endocrine follow up

## 2023-05-11 NOTE — DIETITIAN INITIAL EVALUATION ADULT - PERTINENT MEDS FT
MEDICATIONS  (STANDING):  ARIPiprazole 2 milliGRAM(s) Oral <User Schedule>  cefuroxime   Tablet 250 milliGRAM(s) Oral every 12 hours  chlorhexidine 2% Cloths 1 Application(s) Topical daily  enoxaparin Injectable 40 milliGRAM(s) SubCutaneous every 12 hours  levothyroxine 112 MICROGram(s) Oral daily  LORazepam     Tablet 0.5 milliGRAM(s) Oral at bedtime  remdesivir  IVPB   IV Intermittent   remdesivir  IVPB 100 milliGRAM(s) IV Intermittent every 24 hours  senna 2 Tablet(s) Oral at bedtime  sodium chloride 0.9%. 1000 milliLiter(s) (80 mL/Hr) IV Continuous <Continuous>    MEDICATIONS  (PRN):  LORazepam     Tablet 0.5 milliGRAM(s) Oral every 4 hours PRN CIWA-Ar score 8 or greater

## 2023-05-11 NOTE — DIETITIAN INITIAL EVALUATION ADULT - OTHER INFO
GI/Intake:   -Noted with ~25% intake of breakfast this morning   -No BM documented thus far; bowel regimen ordered (Senna)   -Abd noted to be distended  -Will order Patient Orgain shakes     Resp:   -COVID-19+  -On RA     Weight Hx:  -Current dosin pounds   -No additional weights noted in chart  -Per H&P, noted with ~20 pound weight loss, unknown timeline

## 2023-05-11 NOTE — PROGRESS NOTE ADULT - SUBJECTIVE AND OBJECTIVE BOX
Date of service: 05-11-23 @ 17:46      Patient is a 72y old  Female who presents with a chief complaint of weak, FTT (11 May 2023 15:49)                                                               INTERVAL HPI/OVERNIGHT EVENTS:    REVIEW OF SYSTEMS:     denies cp /sob                                                                                                                                                                                                                                                                    Medications:  MEDICATIONS  (STANDING):  ARIPiprazole 2 milliGRAM(s) Oral <User Schedule>  ascorbic acid 500 milliGRAM(s) Oral daily  chlorhexidine 2% Cloths 1 Application(s) Topical daily  enoxaparin Injectable 40 milliGRAM(s) SubCutaneous every 12 hours  LORazepam     Tablet 0.5 milliGRAM(s) Oral at bedtime  multivitamin 1 Tablet(s) Oral daily  remdesivir  IVPB   IV Intermittent   remdesivir  IVPB 100 milliGRAM(s) IV Intermittent every 24 hours  senna 2 Tablet(s) Oral at bedtime  sodium chloride 0.9%. 1000 milliLiter(s) (80 mL/Hr) IV Continuous <Continuous>    MEDICATIONS  (PRN):  LORazepam     Tablet 0.5 milliGRAM(s) Oral every 4 hours PRN CIWA-Ar score 8 or greater       Allergies    No Known Allergies    Intolerances      Vital Signs Last 24 Hrs  T(C): 36.7 (11 May 2023 16:13), Max: 37.2 (11 May 2023 01:32)  T(F): 98.1 (11 May 2023 16:13), Max: 99 (11 May 2023 01:32)  HR: 99 (11 May 2023 16:13) (78 - 99)  BP: 151/102 (11 May 2023 16:13) (130/85 - 155/106)  BP(mean): --  RR: 18 (11 May 2023 16:13) (18 - 18)  SpO2: 98% (11 May 2023 16:13) (97% - 100%)    Parameters below as of 11 May 2023 16:13  Patient On (Oxygen Delivery Method): room air      CAPILLARY BLOOD GLUCOSE          05-10 @ 07:01  -  05-11 @ 07:00  --------------------------------------------------------  IN: 1820 mL / OUT: 5700 mL / NET: -3880 mL    05-11 @ 07:01  -  05-11 @ 17:46  --------------------------------------------------------  IN: 640 mL / OUT: 1700 mL / NET: -1060 mL      Physical Exam:    Daily     Daily   General: NAD   HEENT:  Nonicteric, PERRLA  CV:  RRR, S1S2   Lungs:  CTA  Abdomen:  Soft, non-tender, no distended, positive BS  Extremities:  no edema                                                                                                                                                                                                                                                                             LABS:                               16.4   8.22  )-----------( 182      ( 11 May 2023 07:40 )             48.9                      05-11    151<H>  |  114<H>  |  29<H>  ----------------------------<  102<H>  4.3   |  26  |  1.60<H>    Ca    9.7      11 May 2023 07:40    TPro  6.3  /  Alb  3.7  /  TBili  0.4  /  DBili  0.1  /  AST  36  /  ALT  28  /  AlkPhos  78  05-11                       RADIOLOGY & ADDITIONAL TESTS         I personally reviewed: [  ]EKG   [  ]CXR    [  ] CT      A/P:         Discussed with :     Becky consultants' Notes   Time spent :

## 2023-05-11 NOTE — BH CONSULTATION LIAISON PROGRESS NOTE - NSBHASSESSMENTFT_PSY_ALL_CORE
Delirium (?due to Covid, hyperthyroidism)  Bipolar Disorder (her disorientation and word finding trouble speaks more to delirium than Bipolar Disorder)  We are giving Abilify to treat paranoid delusions and to prevent Bipolar relapse  She is being treated empirically for syphilis given her positive RPR  Rule out baseline MCI or dementia though niece reports only 6 week history of MS change.  Delirium (?due to Covid, hyperthyroidism)  Bipolar Disorder (her disorientation and word finding trouble speaks more to delirium than Bipolar Disorder)  We are giving Abilify to treat paranoid delusions and to prevent Bipolar relapse  Rule out baseline MCI or dementia though niece reports only 6 week history of MS change.

## 2023-05-11 NOTE — DIETITIAN INITIAL EVALUATION ADULT - PERTINENT LABORATORY DATA
05-11    151<H>  |  114<H>  |  29<H>  ----------------------------<  102<H>  4.3   |  26  |  1.60<H>    Ca    9.7      11 May 2023 07:40    TPro  6.3  /  Alb  3.7  /  TBili  0.4  /  DBili  0.1  /  AST  36  /  ALT  28  /  AlkPhos  78  05-11

## 2023-05-11 NOTE — DIETITIAN INITIAL EVALUATION ADULT - ORAL INTAKE PTA/DIET HISTORY
Limited information obtained in setting of noted confusion. Pt continually repeated that she has Orgain shakes in her apartment, however unable to clarify if/how many she consumes on a regular basis.     "Per H&P, Pt is a , lives by herself ... her neighbor would take her food shopping. Belongs to [employer] who also help.   she is incoherent and 'withdrawn' and confused... Lost her 'common sense' was not eating or drinking and lost 20lbs

## 2023-05-11 NOTE — DIETITIAN INITIAL EVALUATION ADULT - ADD RECOMMEND
1) Continue regular diet  2) Add Orgain BID   3) Add multivitamin and vitamin C daily   4) Monitor PO intake, diet tolerance, weight trends, labs, GI function, and skin integrity  5) Malnutrition sticker placed     Marah Dukes MS, RDN, CDN (Teams/Pager #[phone])

## 2023-05-12 LAB
ALBUMIN SERPL ELPH-MCNC: 3.3 G/DL — SIGNIFICANT CHANGE UP (ref 3.3–5)
ALP SERPL-CCNC: 86 U/L — SIGNIFICANT CHANGE UP (ref 40–120)
ALT FLD-CCNC: 25 U/L — SIGNIFICANT CHANGE UP (ref 10–45)
ANION GAP SERPL CALC-SCNC: 12 MMOL/L — SIGNIFICANT CHANGE UP (ref 5–17)
AST SERPL-CCNC: 23 U/L — SIGNIFICANT CHANGE UP (ref 10–40)
BILIRUB DIRECT SERPL-MCNC: 0.1 MG/DL — SIGNIFICANT CHANGE UP (ref 0–0.3)
BILIRUB INDIRECT FLD-MCNC: 0.3 MG/DL — SIGNIFICANT CHANGE UP (ref 0.2–1)
BILIRUB SERPL-MCNC: 0.4 MG/DL — SIGNIFICANT CHANGE UP (ref 0.2–1.2)
BUN SERPL-MCNC: 29 MG/DL — HIGH (ref 7–23)
CALCIUM SERPL-MCNC: 9.7 MG/DL — SIGNIFICANT CHANGE UP (ref 8.4–10.5)
CHLORIDE SERPL-SCNC: 117 MMOL/L — HIGH (ref 96–108)
CHLORIDE UR-SCNC: 42 MMOL/L — SIGNIFICANT CHANGE UP
CO2 SERPL-SCNC: 24 MMOL/L — SIGNIFICANT CHANGE UP (ref 22–31)
CREAT ?TM UR-MCNC: 20 MG/DL — SIGNIFICANT CHANGE UP
CREAT SERPL-MCNC: 1.47 MG/DL — HIGH (ref 0.5–1.3)
CREAT SERPL-MCNC: 1.48 MG/DL — HIGH (ref 0.5–1.3)
EGFR: 37 ML/MIN/1.73M2 — LOW
EGFR: 37 ML/MIN/1.73M2 — LOW
GLUCOSE SERPL-MCNC: 99 MG/DL — SIGNIFICANT CHANGE UP (ref 70–99)
HCT VFR BLD CALC: 46.3 % — HIGH (ref 34.5–45)
HGB BLD-MCNC: 15.6 G/DL — HIGH (ref 11.5–15.5)
INR BLD: 1.09 RATIO — SIGNIFICANT CHANGE UP (ref 0.88–1.16)
MAGNESIUM SERPL-MCNC: 2.2 MG/DL — SIGNIFICANT CHANGE UP (ref 1.6–2.6)
MCHC RBC-ENTMCNC: 31.5 PG — SIGNIFICANT CHANGE UP (ref 27–34)
MCHC RBC-ENTMCNC: 33.7 GM/DL — SIGNIFICANT CHANGE UP (ref 32–36)
MCV RBC AUTO: 93.5 FL — SIGNIFICANT CHANGE UP (ref 80–100)
NRBC # BLD: 0 /100 WBCS — SIGNIFICANT CHANGE UP (ref 0–0)
OSMOLALITY UR: 245 MOS/KG — LOW (ref 300–900)
PHOSPHATE SERPL-MCNC: 3.1 MG/DL — SIGNIFICANT CHANGE UP (ref 2.5–4.5)
PLATELET # BLD AUTO: 181 K/UL — SIGNIFICANT CHANGE UP (ref 150–400)
POTASSIUM SERPL-MCNC: 3.9 MMOL/L — SIGNIFICANT CHANGE UP (ref 3.5–5.3)
POTASSIUM SERPL-SCNC: 3.9 MMOL/L — SIGNIFICANT CHANGE UP (ref 3.5–5.3)
POTASSIUM UR-SCNC: 23 MMOL/L — SIGNIFICANT CHANGE UP
PROT SERPL-MCNC: 6.4 G/DL — SIGNIFICANT CHANGE UP (ref 6–8.3)
PROTHROM AB SERPL-ACNC: 12.6 SEC — SIGNIFICANT CHANGE UP (ref 10.5–13.4)
RBC # BLD: 4.95 M/UL — SIGNIFICANT CHANGE UP (ref 3.8–5.2)
RBC # FLD: 13.4 % — SIGNIFICANT CHANGE UP (ref 10.3–14.5)
SODIUM SERPL-SCNC: 153 MMOL/L — HIGH (ref 135–145)
SODIUM UR-SCNC: 48 MMOL/L — SIGNIFICANT CHANGE UP
WBC # BLD: 8.29 K/UL — SIGNIFICANT CHANGE UP (ref 3.8–10.5)
WBC # FLD AUTO: 8.29 K/UL — SIGNIFICANT CHANGE UP (ref 3.8–10.5)

## 2023-05-12 PROCEDURE — 93010 ELECTROCARDIOGRAM REPORT: CPT

## 2023-05-12 PROCEDURE — 74177 CT ABD & PELVIS W/CONTRAST: CPT | Mod: 26

## 2023-05-12 PROCEDURE — 71275 CT ANGIOGRAPHY CHEST: CPT | Mod: 26

## 2023-05-12 PROCEDURE — 76770 US EXAM ABDO BACK WALL COMP: CPT | Mod: 26

## 2023-05-12 RX ORDER — SODIUM CHLORIDE 9 MG/ML
1000 INJECTION, SOLUTION INTRAVENOUS
Refills: 0 | Status: DISCONTINUED | OUTPATIENT
Start: 2023-05-12 | End: 2023-05-12

## 2023-05-12 RX ORDER — SODIUM CHLORIDE 9 MG/ML
1000 INJECTION, SOLUTION INTRAVENOUS
Refills: 0 | Status: DISCONTINUED | OUTPATIENT
Start: 2023-05-12 | End: 2023-05-15

## 2023-05-12 RX ORDER — ARIPIPRAZOLE 15 MG/1
5 TABLET ORAL DAILY
Refills: 0 | Status: DISCONTINUED | OUTPATIENT
Start: 2023-05-13 | End: 2023-05-22

## 2023-05-12 RX ORDER — AMLODIPINE BESYLATE 2.5 MG/1
5 TABLET ORAL DAILY
Refills: 0 | Status: DISCONTINUED | OUTPATIENT
Start: 2023-05-12 | End: 2023-05-22

## 2023-05-12 RX ADMIN — ENOXAPARIN SODIUM 40 MILLIGRAM(S): 100 INJECTION SUBCUTANEOUS at 18:03

## 2023-05-12 RX ADMIN — REMDESIVIR 200 MILLIGRAM(S): 5 INJECTION INTRAVENOUS at 09:42

## 2023-05-12 RX ADMIN — Medication 1 TABLET(S): at 12:08

## 2023-05-12 RX ADMIN — SENNA PLUS 2 TABLET(S): 8.6 TABLET ORAL at 22:12

## 2023-05-12 RX ADMIN — CHLORHEXIDINE GLUCONATE 1 APPLICATION(S): 213 SOLUTION TOPICAL at 18:03

## 2023-05-12 RX ADMIN — AMLODIPINE BESYLATE 5 MILLIGRAM(S): 2.5 TABLET ORAL at 12:09

## 2023-05-12 RX ADMIN — SODIUM CHLORIDE 60 MILLILITER(S): 9 INJECTION, SOLUTION INTRAVENOUS at 12:05

## 2023-05-12 RX ADMIN — ENOXAPARIN SODIUM 40 MILLIGRAM(S): 100 INJECTION SUBCUTANEOUS at 05:45

## 2023-05-12 RX ADMIN — Medication 500 MILLIGRAM(S): at 12:08

## 2023-05-12 RX ADMIN — Medication 100 MICROGRAM(S): at 05:45

## 2023-05-12 RX ADMIN — Medication 0.5 MILLIGRAM(S): at 22:12

## 2023-05-12 RX ADMIN — SODIUM CHLORIDE 80 MILLILITER(S): 9 INJECTION INTRAMUSCULAR; INTRAVENOUS; SUBCUTANEOUS at 09:46

## 2023-05-12 NOTE — PROGRESS NOTE ADULT - SUBJECTIVE AND OBJECTIVE BOX
Date of service: 05-12-23 @ 15:15      Patient is a 72y old  Female who presents with a chief complaint of weak, FTT (11 May 2023 15:49)                                                               INTERVAL HPI/OVERNIGHT EVENTS:    REVIEW OF SYSTEMS:     CONSTITUTIONAL: No weakness, fevers or chills  EYES/ENT: No visual changes , no ear ache   NECK: No pain or stiffness  RESPIRATORY: No cough, wheezing,  No shortness of breath  CARDIOVASCULAR: No chest pain or palpitations  GASTROINTESTINAL: No abdominal pain  . No nausea, vomiting, or hematemesis; No diarrhea or constipation. No melena or hematochezia.  GENITOURINARY: No dysuria, frequency or hematuria  NEUROLOGICAL: No numbness or weakness  SKIN: No itching, burning, rashes, or lesions                                                                                                                                                                                                                                                                                 Medications:  MEDICATIONS  (STANDING):  amLODIPine   Tablet 5 milliGRAM(s) Oral daily  ARIPiprazole 2 milliGRAM(s) Oral <User Schedule>  ascorbic acid 500 milliGRAM(s) Oral daily  chlorhexidine 2% Cloths 1 Application(s) Topical daily  dextrose 5%. 1000 milliLiter(s) (60 mL/Hr) IV Continuous <Continuous>  enoxaparin Injectable 40 milliGRAM(s) SubCutaneous every 12 hours  levothyroxine 100 MICROGram(s) Oral daily  LORazepam     Tablet 0.5 milliGRAM(s) Oral at bedtime  multivitamin 1 Tablet(s) Oral daily  remdesivir  IVPB   IV Intermittent   remdesivir  IVPB 100 milliGRAM(s) IV Intermittent every 24 hours  senna 2 Tablet(s) Oral at bedtime    MEDICATIONS  (PRN):  LORazepam     Tablet 0.5 milliGRAM(s) Oral every 4 hours PRN CIWA-Ar score 8 or greater       Allergies    No Known Allergies    Intolerances      Vital Signs Last 24 Hrs  T(C): 36.4 (12 May 2023 13:10), Max: 37.2 (12 May 2023 02:05)  T(F): 97.5 (12 May 2023 13:10), Max: 99 (12 May 2023 02:05)  HR: 104 (12 May 2023 13:10) (99 - 104)  BP: 135/92 (12 May 2023 13:10) (135/92 - 167/116)  BP(mean): --  RR: 18 (12 May 2023 13:10) (17 - 18)  SpO2: 97% (12 May 2023 13:10) (97% - 99%)    Parameters below as of 12 May 2023 13:10  Patient On (Oxygen Delivery Method): room air      CAPILLARY BLOOD GLUCOSE          05-11 @ 07:01 - 05-12 @ 07:00  --------------------------------------------------------  IN: 1520 mL / OUT: 3050 mL / NET: -1530 mL    05-12 @ 07:01  -  05-12 @ 15:15  --------------------------------------------------------  IN: 960 mL / OUT: 900 mL / NET: 60 mL      Physical Exam:    Daily     Daily   General:  Well appearing, NAD, not cachetic  HEENT:  Nonicteric, PERRLA  CV:  RRR, S1S2   Lungs:  CTA B/L, no wheezes, rales, rhonchi  Abdomen:  Soft, non-tender, no distended, positive BS  Extremities:  2+ pulses, no c/c, no edema  Skin:  Warm and dry, no rashes  :  No de guzman  Neuro:  AAOx3, non-focal, grossly intact                                                                                                                                                                                                                                                                                                LABS:                               15.6   8.29  )-----------( 181      ( 12 May 2023 07:04 )             46.3                      05-12    153<H>  |  117<H>  |  29<H>  ----------------------------<  99  3.9   |  24  |  1.47<H>    Ca    9.7      12 May 2023 07:11  Phos  3.1     05-12  Mg     2.2     05-12    TPro  6.4  /  Alb  3.3  /  TBili  0.4  /  DBili  0.1  /  AST  23  /  ALT  25  /  AlkPhos  86  05-12                       RADIOLOGY & ADDITIONAL TESTS         I personally reviewed: [  ]EKG   [  ]CXR    [  ] CT      A/P:         Discussed with :     Becky consultants' Notes   Time spent :

## 2023-05-12 NOTE — BH CONSULTATION LIAISON PROGRESS NOTE - NSBHCONSULTSUBSTANCEALCOHOL_PSY_A_CORE FT
Lorazepam prn (by CIWA protocol - symptom triggered) to prevent withdrawal from Valium (unknown amount and frequency of Valium taken PTA)
Continue symptom triggered CIWA protocol with Lorazepam at half the nongeriatric dose

## 2023-05-12 NOTE — PROGRESS NOTE ADULT - ASSESSMENT
Assessment  72y female with history of thyroid cancer, bipolar d/o, lives alone and by report has been very withdrawn, very poor  po intake  and has recent weight loss.   Hypothyroidism: On Synthroid 112 mcg po daily, TSH 0.06, FT4 2.2. Now Hyperthyroid. Thyroxine adjusted, Hx of Thyroidectomy due to thyroid cancer. Thyroglobulin/AB negative.   COVID+: supportive care, on remdesivir. On isolation         Discussed plan and management wit Dr Blanca Rios MD  Cell: [phone]  Office: [phone]               Assessment  72y female with history of thyroid cancer, bipolar d/o, lives alone and by report has been very withdrawn, very poor  po intake  and has recent weight loss.   Hypothyroidism: On Synthroid 112 mcg po daily, TSH 0.06, FT4 2.2. Now Hyperthyroid. Thyroxine adjusted, Hx of Thyroidectomy due to thyroid cancer.  Thyroglobulin/AB negative.   COVID+: supportive care, on remdesivir. On isolation         Discussed plan and management wit Dr Blanca Rios MD  Cell: [phone]  Office: [phone]

## 2023-05-12 NOTE — BH CONSULTATION LIAISON PROGRESS NOTE - NSBHASSESSMENTFT_PSY_ALL_CORE
Delirium  Bipolar disorder  Unclear if tachycardia and hypertension are from benzodiazepine withdrawal (CIWA is 2 however), infection, or hyperthyroidism  She can benefit from higher dose of Abilify to treat her paranoia and thought disorder

## 2023-05-12 NOTE — PROGRESS NOTE ADULT - SUBJECTIVE AND OBJECTIVE BOX
Chief complaint  Patient is a 72y old  Female who presents with a chief complaint of weak, FTT (11 May 2023 15:49)         Labs and Fingersticks  CAPILLARY BLOOD GLUCOSE          Anion Gap, Serum: 12 (05-12 @ 07:11)  Anion Gap, Serum: 11 (05-11 @ 07:40)      Calcium, Total Serum: 9.7 (05-12 @ 07:11)  Calcium, Total Serum: 9.7 (05-11 @ 07:40)  Albumin, Serum: 3.3 (05-12 @ 07:11)  Albumin, Serum: 3.7 (05-11 @ 07:40)    Alanine Aminotransferase (ALT/SGPT): 25 (05-12 @ 07:11)  Alanine Aminotransferase (ALT/SGPT): 28 (05-11 @ 07:40)  Alkaline Phosphatase, Serum: 86 (05-12 @ 07:11)  Alkaline Phosphatase, Serum: 78 (05-11 @ 07:40)  Aspartate Aminotransferase (AST/SGOT): 23 (05-12 @ 07:11)  Aspartate Aminotransferase (AST/SGOT): 36 (05-11 @ 07:40)        05-12    153<H>  |  117<H>  |  29<H>  ----------------------------<  99  3.9   |  24  |  1.47<H>    Ca    9.7      12 May 2023 07:11  Phos  3.1     05-12  Mg     2.2     05-12    TPro  6.4  /  Alb  3.3  /  TBili  0.4  /  DBili  0.1  /  AST  23  /  ALT  25  /  AlkPhos  86  05-12                        15.6   8.29  )-----------( 181      ( 12 May 2023 07:04 )             46.3     Medications  MEDICATIONS  (STANDING):  amLODIPine   Tablet 5 milliGRAM(s) Oral daily  ARIPiprazole 2 milliGRAM(s) Oral <User Schedule>  ascorbic acid 500 milliGRAM(s) Oral daily  chlorhexidine 2% Cloths 1 Application(s) Topical daily  dextrose 5%. 1000 milliLiter(s) (60 mL/Hr) IV Continuous <Continuous>  enoxaparin Injectable 40 milliGRAM(s) SubCutaneous every 12 hours  levothyroxine 100 MICROGram(s) Oral daily  LORazepam     Tablet 0.5 milliGRAM(s) Oral at bedtime  multivitamin 1 Tablet(s) Oral daily  remdesivir  IVPB   IV Intermittent   remdesivir  IVPB 100 milliGRAM(s) IV Intermittent every 24 hours  senna 2 Tablet(s) Oral at bedtime      Physical Exam  General: Patient comfortable in bed   Vital Signs Last 12 Hrs  T(F): 97.9 (05-12-23 @ 06:05), Max: 99 (05-12-23 @ 02:05)  HR: 104 (05-12-23 @ 06:05) (103 - 104)  BP: 160/105 (05-12-23 @ 06:05) (160/105 - 167/116)  BP(mean): --  RR: 18 (05-12-23 @ 06:05) (17 - 18)  SpO2: 99% (05-12-23 @ 06:05) (99% - 99%)    CVS: S1S2   Respiratory: No wheezing, no crepitations  GI: Abdomen soft, bowel sounds positive  Musculoskeletal:  moves all extremities  : Voiding          Chief complaint  Patient is a 72y old  Female who presents with a chief complaint of weak, FTT (11 May 2023 15:49)     Labs and Fingersticks  CAPILLARY BLOOD GLUCOSE          Anion Gap, Serum: 12 (05-12 @ 07:11)  Anion Gap, Serum: 11 (05-11 @ 07:40)      Calcium, Total Serum: 9.7 (05-12 @ 07:11)  Calcium, Total Serum: 9.7 (05-11 @ 07:40)  Albumin, Serum: 3.3 (05-12 @ 07:11)  Albumin, Serum: 3.7 (05-11 @ 07:40)    Alanine Aminotransferase (ALT/SGPT): 25 (05-12 @ 07:11)  Alanine Aminotransferase (ALT/SGPT): 28 (05-11 @ 07:40)  Alkaline Phosphatase, Serum: 86 (05-12 @ 07:11)  Alkaline Phosphatase, Serum: 78 (05-11 @ 07:40)  Aspartate Aminotransferase (AST/SGOT): 23 (05-12 @ 07:11)  Aspartate Aminotransferase (AST/SGOT): 36 (05-11 @ 07:40)        05-12    153<H>  |  117<H>  |  29<H>  ----------------------------<  99  3.9   |  24  |  1.47<H>    Ca    9.7      12 May 2023 07:11  Phos  3.1     05-12  Mg     2.2     05-12    TPro  6.4  /  Alb  3.3  /  TBili  0.4  /  DBili  0.1  /  AST  23  /  ALT  25  /  AlkPhos  86  05-12                        15.6   8.29  )-----------( 181      ( 12 May 2023 07:04 )             46.3     Medications  MEDICATIONS  (STANDING):  amLODIPine   Tablet 5 milliGRAM(s) Oral daily  ARIPiprazole 2 milliGRAM(s) Oral <User Schedule>  ascorbic acid 500 milliGRAM(s) Oral daily  chlorhexidine 2% Cloths 1 Application(s) Topical daily  dextrose 5%. 1000 milliLiter(s) (60 mL/Hr) IV Continuous <Continuous>  enoxaparin Injectable 40 milliGRAM(s) SubCutaneous every 12 hours  levothyroxine 100 MICROGram(s) Oral daily  LORazepam     Tablet 0.5 milliGRAM(s) Oral at bedtime  multivitamin 1 Tablet(s) Oral daily  remdesivir  IVPB   IV Intermittent   remdesivir  IVPB 100 milliGRAM(s) IV Intermittent every 24 hours  senna 2 Tablet(s) Oral at bedtime      Physical Exam  General: Patient comfortable in bed   Vital Signs Last 12 Hrs  T(F): 97.9 (05-12-23 @ 06:05), Max: 99 (05-12-23 @ 02:05)  HR: 104 (05-12-23 @ 06:05) (103 - 104)  BP: 160/105 (05-12-23 @ 06:05) (160/105 - 167/116)  BP(mean): --  RR: 18 (05-12-23 @ 06:05) (17 - 18)  SpO2: 99% (05-12-23 @ 06:05) (99% - 99%)    CVS: S1S2   Respiratory: No wheezing, no crepitations  GI: Abdomen soft, bowel sounds positive  Musculoskeletal:  moves all extremities  : Voiding

## 2023-05-12 NOTE — CONSULT NOTE ADULT - SUBJECTIVE AND OBJECTIVE BOX
HPI: Ms. Aguirre is a 72 year-old woman with history only notable for thyroid cancer s/p resection, with resultant hypothyroidism. She presented on 5/8/23 to the [facility] ER with loss of appetite, associated 20lb weight loss, insomnia, and worsening confusion. She was found in the ER to be COVID positive on rapid sputum panel; she admitted to fever and vomiting 1month prior to admission. She was placed on Remdesivir on admission. Urine studies from admission returned consistent with EColi UTI - she is now s/p a 3-day course of IV Rocephin.    Since admission, Ms. Aguirre has been noted to have azotemia with creatinine ranging from 1.4-1.7mg/dL. Her serum sodium has been elevated and today is up to 153meq/L. In light of these findings, a renal consultation was requested today.  IVF today was changed from NS @80cc/h to 1/2NS @60cc/h      PAST MEDICAL & SURGICAL HISTORY:  Hypothyroid  Thyroid cancer - resection    Allergies  No Known Allergies    SOCIAL HISTORY:  Denies ETOh,Smoking,     FAMILY HISTORY:  No CKD    REVIEW OF SYSTEMS:  CONSTITUTIONAL: (+)generalized weakness, (+)fever 1 month ago, (+)loss of appetite, (+)weight loss, (+)insomnia  EYES/ENT: No visual changes;  No vertigo or throat pain   NECK: No pain or stiffness  RESPIRATORY: No cough, wheezing, hemoptysis; No shortness of breath  CARDIOVASCULAR: No chest pain or palpitations  GASTROINTESTINAL: (+)vomiting 1month ago  GENITOURINARY: No dysuria, frequency or hematuria  NEUROLOGICAL: (+)AMS  SKIN: No itching, burning, rashes, or lesions   All other review of systems is negative unless indicated above.    VITAL:  T(C): , Max: 37.2 (05-12-23 @ 02:05)  T(F): , Max: 99 (05-12-23 @ 02:05)  HR: 104 (05-12-23 @ 06:05)  BP: 160/105 (05-12-23 @ 06:05)  RR: 18 (05-12-23 @ 06:05)  SpO2: 99% (05-12-23 @ 06:05)  Urine output 3050cc/24h    PHYSICAL EXAM:  Constitutional: NAD, Alert  HEENT: NCAT, MMM  Neck: Supple, No JVD  Respiratory: CTA-b/l  Cardiovascular: tachy s1s2, no m/r/g  Gastrointestinal: BS+, soft, NT/ND  : (+)de guzman  Extremities: No peripheral edema b/l  Neurological: no focal deficits; strength grossly intact  Back: no CVAT b/l  Skin: No rashes, no nevi    LABS:                        15.6   8.29  )-----------( 181      ( 12 May 2023 07:04 )             46.3     Na(153)/K(3.9)/Cl(117)/HCO3(24)/BUN(29)/Cr(1.47)Glu(99)/Ca(9.7)/Mg(2.2)/PO4(3.1)    05-12 @ 07:11  Na(151)/K(4.3)/Cl(114)/HCO3(26)/BUN(29)/Cr(1.60)Glu(102)/Ca(9.7)/Mg(--)/PO4(--)    05-11 @ 07:40  Na(151)/K(4.6)/Cl(111)/HCO3(28)/BUN(25)/Cr(1.68)Glu(135)/Ca(10.6)/Mg(--)/PO4(--)    05-10 @ 09:09    (5/9/23) - BUN/creatinine: 20/1.01; Na 152, Ca 10.9  (5/8/23) - BUN/creatinine: 21/1.39; UA - 3RBC, 314WBC, SG 1.008, trace prot; UCx >100,000 EColi    IMAGING:  < from: CT Head No Cont (05.08.23 @ 15:39) >  No acute intracranial hemorrhage or acute territorial infarction    < from: Xray Chest 1 View- PORTABLE-Urgent (05.08.23 @ 12:12) >  Clear lungs.      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 Low urine osm on admission despite hypernatremia. Does she have chronic diabetes insipidus? Possible that the hypercalcemia from admission led to the low urine SG at that time.    (3)Hypercalcemia - mild - improved since admission with IVF    (4)CV - likely longstanding hypertension      RECOMMEND:  (1)Change IVF to D5W@60cc/h  (2)Add Norvasc 5mg po qd  (3)Urine: lytes, creatinine, protein, osm  (4)Serum: SPEP/immunofixation in a.m.  (5)Renal US  (6)BMP dialy  (7)Dose new meds for GFR 35-40ml/min    Thank you for involving [facility] in this patient's care.    With warm regards,    Spike Quach MD   [facility]  Office: [phone]  Cell: [phone]               HPI: Ms. Aguirre is a 72 year-old woman with history only notable for thyroid cancer s/p resection, with resultant hypothyroidism. She presented on 5/8/23 to the [facility] ER with loss of appetite, associated 20lb weight loss, insomnia, and worsening confusion. She was found in the ER to be COVID positive on rapid sputum panel; she admitted to fever and vomiting 1month prior to admission. She was placed on Remdesivir on admission. Urine studies from admission returned consistent with EColi UTI - she is now s/p a 3-day course of IV Rocephin.    Since admission, Ms. Aguirre has been noted to have azotemia with creatinine ranging from 1.4-1.7mg/dL. Her serum sodium has been elevated and today is up to 153meq/L. In light of these findings, a renal consultation was requested today.  IVF today was changed from NS @80cc/h to 1/2NS @60cc/h    Limited history due to patient's confusion - not answering simple questions appropriately.      PAST MEDICAL & SURGICAL HISTORY:  Hypothyroid  Thyroid cancer - resection    Allergies  No Known Allergies    SOCIAL HISTORY:  Denies ETOh,Smoking,     FAMILY HISTORY:  No CKD    REVIEW OF SYSTEMS:  CONSTITUTIONAL: (+)generalized weakness, (+)fever 1 month ago, (+)loss of appetite, (+)weight loss, (+)insomnia  EYES/ENT: No visual changes;  No vertigo or throat pain   NECK: No pain or stiffness  RESPIRATORY: No cough, wheezing, hemoptysis; No shortness of breath  CARDIOVASCULAR: No chest pain or palpitations  GASTROINTESTINAL: (+)vomiting 1month ago  GENITOURINARY: No dysuria, frequency or hematuria  NEUROLOGICAL: (+)AMS  SKIN: No itching, burning, rashes, or lesions   All other review of systems is negative unless indicated above.    VITAL:  T(C): , Max: 37.2 (05-12-23 @ 02:05)  T(F): , Max: 99 (05-12-23 @ 02:05)  HR: 104 (05-12-23 @ 06:05)  BP: 160/105 (05-12-23 @ 06:05)  RR: 18 (05-12-23 @ 06:05)  SpO2: 99% (05-12-23 @ 06:05)  Urine output 3050cc/24h    PHYSICAL EXAM:  Constitutional: Confused; NAD  HEENT: NCAT, DMM  Neck: Supple, No JVD  Respiratory: CTA-b/l  Cardiovascular: tachy s1s2, no m/r/g  Gastrointestinal: BS+, soft, NT/ND  : (+)de guzman  Extremities: No peripheral edema b/l  Neurological: no focal deficits; strength grossly intact  Back: no CVAT b/l  Skin: No rashes, no nevi    LABS:                        15.6   8.29  )-----------( 181      ( 12 May 2023 07:04 )             46.3     Na(153)/K(3.9)/Cl(117)/HCO3(24)/BUN(29)/Cr(1.47)Glu(99)/Ca(9.7)/Mg(2.2)/PO4(3.1)    05-12 @ 07:11  Na(151)/K(4.3)/Cl(114)/HCO3(26)/BUN(29)/Cr(1.60)Glu(102)/Ca(9.7)/Mg(--)/PO4(--)    05-11 @ 07:40  Na(151)/K(4.6)/Cl(111)/HCO3(28)/BUN(25)/Cr(1.68)Glu(135)/Ca(10.6)/Mg(--)/PO4(--)    05-10 @ 09:09    (5/9/23) - BUN/creatinine: 20/1.01; Na 152, Ca 10.9  (5/8/23) - BUN/creatinine: 21/1.39; UA - 3RBC, 314WBC, SG 1.008, trace prot; UCx >100,000 EColi    IMAGING:  < from: CT Head No Cont (05.08.23 @ 15:39) >  No acute intracranial hemorrhage or acute territorial infarction    < from: Xray Chest 1 View- PORTABLE-Urgent (05.08.23 @ 12:12) >  Clear lungs.      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 Low urine osm on admission despite hypernatremia. Does she have chronic diabetes insipidus? Possible that the hypercalcemia from admission led to the low urine SG at that time.    (3)Hypercalcemia - mild - improved since admission with IVF    (4)CV - likely longstanding hypertension      RECOMMEND:  (1)Change IVF to D5W@60cc/h  (2)Add Norvasc 5mg po qd  (3)Urine: lytes, creatinine, protein, osm  (4)Serum: SPEP/immunofixation in a.m.  (5)Renal US  (6)BMP dialy  (7)Dose new meds for GFR 35-40ml/min    Thank you for involving [facility] in this patient's care.    With warm regards,    Spike Quach MD   [facility]  Office: [phone]  Cell: [phone]

## 2023-05-12 NOTE — PROVIDER CONTACT NOTE (OTHER) - BACKGROUND
admitted with FTT. PT straight catghed once during the day.
Patient admitted for weakness, poor PO intake and +COVID
Patient admitted for failure to thrive, decreased PO intake, .

## 2023-05-12 NOTE — BH CONSULTATION LIAISON PROGRESS NOTE - NSBHCONSULTRECOMMENDOTHER_PSY_A_CORE FT
Check and follow QTc. If QTc is under 500ms, increase Abilify to 5mg p.o. qd  Increase standing dose of Ativan to 0.75mg qhs

## 2023-05-12 NOTE — PROGRESS NOTE ADULT - ASSESSMENT
72-year-old female with history of bipolar disorder and dementia ?   hypothyroidism s/p thyroidectomy ( had thyroid cancer )   comes into the ED via EMS for failure to thrive.  Spoke with her niece,     Mylene   at [phone], who lives in [place] and is the patient's healthcare proxy. pt is a  , lives by herself ... her neighbor would take her food shopping.belongs to [employer] who also help.   she is incoherent and " withdrawn" and confused...  Lost her " common sense"   was not eating or drinking and lost 20lbs   She used to live with her  that he passed away in September 2020.  She was initially depressed for a brief period of time but returned to her baseline and was active until a month ago.  Her niece reports she used to be on lithium for her bipolar but has not been treated for the past 8 years.  she also reports she used to be on valium for " extreme insomnia " which pt had stopped for " sometime...     Currently in the emergency department patient does not complain of any pain or discomfort.  She states she has a decreased appetite and does not have any pain, nausea, vomiting or inability to eat.  Patient is not able to provide much of the history and she keeps stating she feels out of it and is unable to concentrate on anything.    pt was found to have COVID positve RVP.. a month ago she had fever , vomitting..    pt is not able to provide any information  partially she because she is hard of hearing     - COVID  infection unclear time of exposure   due to recent changes in status : will give remdesivir   no indication for dexa   cont to monitor   ID consulted  appreciate input     abd distention ; urinary retention   monitor and place foely per protocol     uti ; complete ctx     hx of bipolar : has nt been on meds and does not seem to be actively psychotic   consider psych     FTT : ivf     JAYLA : monitor  likley multifactorial including urinary retention   renla input     breast lesion : per report ..pt refused exam . Chaperon  present at the time       d/w HCP [facility]  will fu final decision but does not seem to want her to be resuscitated ..had not signed papers in past  72-year-old female with history of bipolar disorder and dementia ?   hypothyroidism s/p thyroidectomy ( had thyroid cancer )   comes into the ED via EMS for failure to thrive.  Spoke with her niece,     Mylene   at [phone], who lives in [place] and is the patient's healthcare proxy. pt is a  , lives by herself ... her neighbor would take her food shopping.belongs to [employer] who also help.   she is incoherent and " withdrawn" and confused...  Lost her " common sense"   was not eating or drinking and lost 20lbs   She used to live with her  that he passed away in September 2020.  She was initially depressed for a brief period of time but returned to her baseline and was active until a month ago.  Her niece reports she used to be on lithium for her bipolar but has not been treated for the past 8 years.  she also reports she used to be on valium for " extreme insomnia " which pt had stopped for " sometime...     Currently in the emergency department patient does not complain of any pain or discomfort.  She states she has a decreased appetite and does not have any pain, nausea, vomiting or inability to eat.  Patient is not able to provide much of the history and she keeps stating she feels out of it and is unable to concentrate on anything.    pt was found to have COVID positve RVP.. a month ago she had fever , vomitting..    pt is not able to provide any information  partially she because she is hard of hearing     - COVID  infection unclear time of exposure   due to recent changes in status : will give remdesivir   no indication for dexa   cont to monitor   ID consulted  appreciate input     abd distention ; urinary retention   monitor and place foely per protocol     uti ; complete ctx     hx of bipolar : has nt been on meds and does not seem to be actively psychotic   consider psych     FTT : ivf     JAYLA : monitor  likley multifactorial including urinary retention   renla input     tachycardia/elebvated bP : ? sec to infection /hyperthyroid /withdrwal /anxiety ?  cont abx   benzo per psych   decreased synthroid   FU with endo       breast lesion : per report ..pt refused exam . Chaperon  present at the time       d/w HCP [facility]  will fu final decision but does not seem to want her to be resuscitated ..had not signed papers in past

## 2023-05-13 LAB
ALBUMIN SERPL ELPH-MCNC: 3.4 G/DL — SIGNIFICANT CHANGE UP (ref 3.3–5)
ALBUMIN SERPL ELPH-MCNC: 3.7 G/DL — SIGNIFICANT CHANGE UP (ref 3.3–5)
ALP SERPL-CCNC: 93 U/L — SIGNIFICANT CHANGE UP (ref 40–120)
ALP SERPL-CCNC: 93 U/L — SIGNIFICANT CHANGE UP (ref 40–120)
ALT FLD-CCNC: 24 U/L — SIGNIFICANT CHANGE UP (ref 10–45)
ALT FLD-CCNC: 25 U/L — SIGNIFICANT CHANGE UP (ref 10–45)
ANION GAP SERPL CALC-SCNC: 11 MMOL/L — SIGNIFICANT CHANGE UP (ref 5–17)
AST SERPL-CCNC: 24 U/L — SIGNIFICANT CHANGE UP (ref 10–40)
AST SERPL-CCNC: 27 U/L — SIGNIFICANT CHANGE UP (ref 10–40)
BILIRUB DIRECT SERPL-MCNC: 0.1 MG/DL — SIGNIFICANT CHANGE UP (ref 0–0.3)
BILIRUB DIRECT SERPL-MCNC: 0.2 MG/DL — SIGNIFICANT CHANGE UP (ref 0–0.3)
BILIRUB INDIRECT FLD-MCNC: 0.3 MG/DL — SIGNIFICANT CHANGE UP (ref 0.2–1)
BILIRUB INDIRECT FLD-MCNC: 0.4 MG/DL — SIGNIFICANT CHANGE UP (ref 0.2–1)
BILIRUB SERPL-MCNC: 0.5 MG/DL — SIGNIFICANT CHANGE UP (ref 0.2–1.2)
BILIRUB SERPL-MCNC: 0.5 MG/DL — SIGNIFICANT CHANGE UP (ref 0.2–1.2)
BUN SERPL-MCNC: 27 MG/DL — HIGH (ref 7–23)
CALCIUM SERPL-MCNC: 10 MG/DL — SIGNIFICANT CHANGE UP (ref 8.4–10.5)
CHLORIDE SERPL-SCNC: 111 MMOL/L — HIGH (ref 96–108)
CO2 SERPL-SCNC: 27 MMOL/L — SIGNIFICANT CHANGE UP (ref 22–31)
CREAT SERPL-MCNC: 1.33 MG/DL — HIGH (ref 0.5–1.3)
CREAT SERPL-MCNC: 1.39 MG/DL — HIGH (ref 0.5–1.3)
EGFR: 40 ML/MIN/1.73M2 — LOW
EGFR: 42 ML/MIN/1.73M2 — LOW
GLUCOSE SERPL-MCNC: 116 MG/DL — HIGH (ref 70–99)
HCT VFR BLD CALC: 48.7 % — HIGH (ref 34.5–45)
HGB BLD-MCNC: 16.5 G/DL — HIGH (ref 11.5–15.5)
INR BLD: 1.15 RATIO — SIGNIFICANT CHANGE UP (ref 0.88–1.16)
MCHC RBC-ENTMCNC: 31.3 PG — SIGNIFICANT CHANGE UP (ref 27–34)
MCHC RBC-ENTMCNC: 33.9 GM/DL — SIGNIFICANT CHANGE UP (ref 32–36)
MCV RBC AUTO: 92.4 FL — SIGNIFICANT CHANGE UP (ref 80–100)
NRBC # BLD: 0 /100 WBCS — SIGNIFICANT CHANGE UP (ref 0–0)
PLATELET # BLD AUTO: 169 K/UL — SIGNIFICANT CHANGE UP (ref 150–400)
POTASSIUM SERPL-MCNC: 3.5 MMOL/L — SIGNIFICANT CHANGE UP (ref 3.5–5.3)
POTASSIUM SERPL-SCNC: 3.5 MMOL/L — SIGNIFICANT CHANGE UP (ref 3.5–5.3)
PROT SERPL-MCNC: 6.2 G/DL — SIGNIFICANT CHANGE UP (ref 6–8.3)
PROT SERPL-MCNC: 6.2 G/DL — SIGNIFICANT CHANGE UP (ref 6–8.3)
PROT SERPL-MCNC: 6.6 G/DL — SIGNIFICANT CHANGE UP (ref 6–8.3)
PROT SERPL-MCNC: 6.7 G/DL — SIGNIFICANT CHANGE UP (ref 6–8.3)
PROTHROM AB SERPL-ACNC: 13.4 SEC — SIGNIFICANT CHANGE UP (ref 10.5–13.4)
RBC # BLD: 5.27 M/UL — HIGH (ref 3.8–5.2)
RBC # FLD: 13.3 % — SIGNIFICANT CHANGE UP (ref 10.3–14.5)
SODIUM SERPL-SCNC: 149 MMOL/L — HIGH (ref 135–145)
WBC # BLD: 7.87 K/UL — SIGNIFICANT CHANGE UP (ref 3.8–10.5)
WBC # FLD AUTO: 7.87 K/UL — SIGNIFICANT CHANGE UP (ref 3.8–10.5)

## 2023-05-13 RX ORDER — CEFUROXIME AXETIL 250 MG
500 TABLET ORAL EVERY 12 HOURS
Refills: 0 | Status: DISCONTINUED | OUTPATIENT
Start: 2023-05-13 | End: 2023-05-13

## 2023-05-13 RX ORDER — ENOXAPARIN SODIUM 100 MG/ML
30 INJECTION SUBCUTANEOUS EVERY 24 HOURS
Refills: 0 | Status: DISCONTINUED | OUTPATIENT
Start: 2023-05-13 | End: 2023-05-19

## 2023-05-13 RX ORDER — CEFUROXIME AXETIL 250 MG
250 TABLET ORAL EVERY 12 HOURS
Refills: 0 | Status: DISCONTINUED | OUTPATIENT
Start: 2023-05-13 | End: 2023-05-18

## 2023-05-13 RX ADMIN — REMDESIVIR 200 MILLIGRAM(S): 5 INJECTION INTRAVENOUS at 09:43

## 2023-05-13 RX ADMIN — Medication 250 MILLIGRAM(S): at 18:06

## 2023-05-13 RX ADMIN — Medication 500 MILLIGRAM(S): at 12:29

## 2023-05-13 RX ADMIN — ARIPIPRAZOLE 5 MILLIGRAM(S): 15 TABLET ORAL at 12:25

## 2023-05-13 RX ADMIN — ENOXAPARIN SODIUM 30 MILLIGRAM(S): 100 INJECTION SUBCUTANEOUS at 13:38

## 2023-05-13 RX ADMIN — AMLODIPINE BESYLATE 5 MILLIGRAM(S): 2.5 TABLET ORAL at 05:31

## 2023-05-13 RX ADMIN — Medication 0.75 MILLIGRAM(S): at 22:27

## 2023-05-13 RX ADMIN — CHLORHEXIDINE GLUCONATE 1 APPLICATION(S): 213 SOLUTION TOPICAL at 11:08

## 2023-05-13 RX ADMIN — Medication 1 TABLET(S): at 12:25

## 2023-05-13 RX ADMIN — Medication 100 MICROGRAM(S): at 05:31

## 2023-05-13 RX ADMIN — ENOXAPARIN SODIUM 40 MILLIGRAM(S): 100 INJECTION SUBCUTANEOUS at 05:31

## 2023-05-13 NOTE — PROGRESS NOTE ADULT - ASSESSMENT
Assessment  72y female with history of thyroid cancer, bipolar d/o, lives alone and by report has been very withdrawn, very poor  po intake and has recent weight loss, no overnighrt events.  Hypothyroidism: On Synthroid 112 mcg po daily, TSH 0.06, FT4 2.2. Now Hyperthyroid. Thyroxine adjusted, Hx of Thyroidectomy due to thyroid cancer.  Thyroglobulin/AB negative.   COVID+: supportive care, on remdesivir. On isolation         Sloan Rios MD  Cell: [phone]  Office: [phone]

## 2023-05-13 NOTE — PROGRESS NOTE ADULT - ASSESSMENT
72-year-old female with history of bipolar disorder and dementia ? hypothyroidism s/p thyroidectomy (had thyroid cancer)   comes into the ED via EMS for failure to thrive.  Spoke with her niece,  Mylene  at [phone], who lives in [place] and is the patient's healthcare proxy.   Pt is a  , lives by herself ... her neighbor would take her food shopping. belongs to [employer] who also help.   she is incoherent and " withdrawn" and confused...  Lost her " common sense"   was not eating or drinking and lost 20lbs   She used to live with her  that he passed away in September 2020.  She was initially depressed for a brief period of time but returned to her baseline and was active until a month ago.  Her niece reports she used to be on lithium for her bipolar but has not been treated for the past 8 years.  she also reports she used to be on valium for " extreme insomnia " which pt had stopped for " sometime...    Currently in the emergency department patient does not complain of any pain or discomfort.  She states she has a decreased appetite and does not have any pain, nausea, vomiting or inability to eat.  Patient is not able to provide much of the history and she keeps stating she feels out of it and is unable to concentrate on anything.    pt was found to have COVID positive RVP.. a month ago she had fever , vomiting    pt is not able to provide any information  partially because she is hard of hearing     - COVID  infection unclear time of exposure   due to recent changes in status : will give remdesivir, completed  no indication for dexa   she remain stable on room air  cont to monitor   ID consult appreciated    abd distention; urinary retention   monitor and place foely per protocol     uti; complete ctx   However, CT abd with right sided pyelo?  ID f/u requested.     hx of bipolar: has not been on meds and does not seem to be actively psychotic   psych  following    FTT with hypernatremia: ivf D5W  monitor Na    JAYLA : monitor  likely multifactorial including urinary retention   renal input     tachycardia/elebvated bP : ? sec to infection /hyperthyroid /withdrwal /anxiety ?  cont abx   benzo per psych   decreased synthroid   FU with endo     breast lesion: per report ..pt refused exam . Chaperon  present at the time   should follow up with outpt PCP and mammogram nonurgently       d/w HCP [facility]  will fu final decision but does not seem to want her to be resuscitated ..had not signed papers in past  72-year-old female with history of bipolar disorder and dementia ? hypothyroidism s/p thyroidectomy (had thyroid cancer)   comes into the ED via EMS for failure to thrive.  Spoke with her niece,  Mylene  at [phone], who lives in [place] and is the patient's healthcare proxy.   Pt is a  , lives by herself ... her neighbor would take her food shopping. belongs to [employer] who also help.   she is incoherent and " withdrawn" and confused...  Lost her " common sense"   was not eating or drinking and lost 20lbs   She used to live with her  that he passed away in September 2020.  She was initially depressed for a brief period of time but returned to her baseline and was active until a month ago.  Her niece reports she used to be on lithium for her bipolar but has not been treated for the past 8 years.  she also reports she used to be on valium for " extreme insomnia " which pt had stopped for " sometime...    Currently in the emergency department patient does not complain of any pain or discomfort.  She states she has a decreased appetite and does not have any pain, nausea, vomiting or inability to eat.  Patient is not able to provide much of the history and she keeps stating she feels out of it and is unable to concentrate on anything.    pt was found to have COVID positive RVP.. a month ago she had fever , vomiting    pt is not able to provide any information  partially because she is hard of hearing     - COVID  infection unclear time of exposure   due to recent changes in status : will give remdesivir, completed  no indication for dexa   she remain stable on room air  cont to monitor   ID consult appreciated    abd distention; urinary retention   monitor and place foely per protocol     uti; complete ctx   However, CT abd with right sided pyelo?  ID f/u requested.     hx of bipolar: has not been on meds and does not seem to be actively psychotic   psych  following    FTT with hypernatremia: ivf D5W  monitor Na    JAYLA : monitor  likely multifactorial including urinary retention   renal input     tachycardia/elebvated bP : ? sec to infection /hyperthyroid /withdrwal /anxiety ?  cont abx   benzo per psych   decreased synthroid   FU with endo     breast lesion: per report ..pt refused exam . Chaperon  present at the time   should follow up with outpt PCP and mammogram nonurgently       DVT ppx: no PE on CTangio. switch back to Lovenox 30 mg qdaily ppx dose    d/w HCP [facility]  will fu final decision but does not seem to want her to be resuscitated ..had not signed papers in past

## 2023-05-13 NOTE — PROGRESS NOTE ADULT - SUBJECTIVE AND OBJECTIVE BOX
Chief complaint    Patient is a 72y old  Female who presents with a chief complaint of weak, FTT (11 May 2023 15:49)   Review of systems  Patient appears comfortable.    Labs and Fingersticks  CAPILLARY BLOOD GLUCOSE          Anion Gap, Serum: 11 (05-13 @ 07:33)  Anion Gap, Serum: 12 (05-12 @ 07:11)      Calcium, Total Serum: 10.0 (05-13 @ 07:33)  Calcium, Total Serum: 9.7 (05-12 @ 07:11)  Albumin, Serum: 3.7 (05-13 @ 10:07)  Albumin, Serum: 3.4 (05-13 @ 07:33)  Albumin, Serum: 3.3 (05-12 @ 07:11)    Alanine Aminotransferase (ALT/SGPT): 25 (05-13 @ 10:07)  Alanine Aminotransferase (ALT/SGPT): 24 (05-13 @ 07:33)  Alanine Aminotransferase (ALT/SGPT): 25 (05-12 @ 07:11)  Alkaline Phosphatase, Serum: 93 (05-13 @ 10:07)  Alkaline Phosphatase, Serum: 93 (05-13 @ 07:33)  Alkaline Phosphatase, Serum: 86 (05-12 @ 07:11)  Aspartate Aminotransferase (AST/SGOT): 27 (05-13 @ 10:07)  Aspartate Aminotransferase (AST/SGOT): 24 (05-13 @ 07:33)  Aspartate Aminotransferase (AST/SGOT): 23 (05-12 @ 07:11)        05-13    x   |  x   |  x   ----------------------------<  x   x    |  x   |  1.39<H>    Ca    10.0      13 May 2023 07:33  Phos  3.1     05-12  Mg     2.2     05-12    TPro  6.7  /  Alb  3.7  /  TBili  0.5  /  DBili  0.2  /  AST  27  /  ALT  25  /  AlkPhos  93  05-13                        16.5   7.87  )-----------( 169      ( 13 May 2023 07:34 )             48.7     Medications  MEDICATIONS  (STANDING):  amLODIPine   Tablet 5 milliGRAM(s) Oral daily  ARIPiprazole 5 milliGRAM(s) Oral daily  ascorbic acid 500 milliGRAM(s) Oral daily  cefuroxime   Tablet 500 milliGRAM(s) Oral every 12 hours  chlorhexidine 2% Cloths 1 Application(s) Topical daily  dextrose 5%. 1000 milliLiter(s) (60 mL/Hr) IV Continuous <Continuous>  enoxaparin Injectable 30 milliGRAM(s) SubCutaneous every 24 hours  levothyroxine 100 MICROGram(s) Oral daily  LORazepam     Tablet 0.75 milliGRAM(s) Oral at bedtime  multivitamin 1 Tablet(s) Oral daily  senna 2 Tablet(s) Oral at bedtime      Physical Exam  General: Patient appears comfortable.  Vital Signs Last 12 Hrs  T(F): 98.2 (05-13-23 @ 08:52), Max: 98.2 (05-13-23 @ 08:52)  HR: 112 (05-13-23 @ 08:52) (111 - 112)  BP: 142/96 (05-13-23 @ 08:52) (142/96 - 147/102)  BP(mean): --  RR: 18 (05-13-23 @ 08:52) (18 - 18)  SpO2: 100% (05-13-23 @ 08:52) (96% - 100%)  Neck: No palpable thyroid nodules.  CVS: S1S2, No murmurs  Respiratory: No wheezing, no crepitations  GI: Abdomen soft, non tender.    Diagnostics        Radiology:

## 2023-05-13 NOTE — PROGRESS NOTE ADULT - SUBJECTIVE AND OBJECTIVE BOX
CC: f/u for covid and pyelo    Patient reports she needs her call bell and phone    REVIEW OF SYSTEMS:  All other review of systems negative (Comprehensive ROS)    Antimicrobials Day #  :5/10  cefuroxime   Tablet 250 milliGRAM(s) Oral every 12 hours    Other Medications Reviewed    T(F): 98.3 (05-13-23 @ 16:20), Max: 98.7 (05-12-23 @ 21:59)  HR: 110 (05-13-23 @ 16:20)  BP: 119/76 (05-13-23 @ 16:20)  RR: 18 (05-13-23 @ 16:20)  SpO2: 96% (05-13-23 @ 16:20)  Wt(kg): --    PHYSICAL EXAM:  General: alert, no acute distress  Eyes:  anicteric, no conjunctival injection, no discharge  Oropharynx: no lesions or injection 	  Neck: supple, without adenopathy  Lungs: clear to auscultation  Heart: regular rate and rhythm; no murmur, rubs or gallops  Abdomen: soft, nondistended, nontender, without mass or organomegaly  Skin: no lesions  Extremities: no clubbing, cyanosis, or edema  Neurologic: alert, confused  moves all extremities    LAB RESULTS:                        16.5   7.87  )-----------( 169      ( 13 May 2023 07:34 )             48.7     05-13    x   |  x   |  x   ----------------------------<  x   x    |  x   |  1.39<H>    Ca    10.0      13 May 2023 07:33  Phos  3.1     05-12  Mg     2.2     05-12    TPro  6.7  /  Alb  3.7  /  TBili  0.5  /  DBili  0.2  /  AST  27  /  ALT  25  /  AlkPhos  93  05-13    LIVER FUNCTIONS - ( 13 May 2023 10:07 )  Alb: 3.7 g/dL / Pro: 6.7 g/dL / ALK PHOS: 93 U/L / ALT: 25 U/L / AST: 27 U/L / GGT: x             MICROBIOLOGY:  RECENT CULTURES:      RADIOLOGY REVIEWED:      < from: CT Angio Chest PE Protocol w/ IV Cont (05.12.23 @ 21:06) >    ACC: [id number] EXAM:  CT ABDOMEN AND PELVIS OC IC   ORDERED BY: SUNNY CONTI     ACC: [id number] EXAM:  CT ANGIO CHEST PULM ART [facility]   ORDERED BY: JESSICA STEPHENS     PROCEDURE DATE:  05/12/2023          INTERPRETATION:  CLINICAL INFORMATION: COVID- positive. Abdominal   distention and urinary retention. UTI.    COMPARISON: None.    CONTRAST/COMPLICATIONS:  IV Contrast: IV contrast documented in unlinked concurrent exam  Oral Contrast: Omnipaque 300  Complications: None reported at time of study completion    PROCEDURE:  CT Angiography of the Chest was performed followed by portal venous phase   imaging of the Abdomen and Pelvis.  Sagittal and coronal reformats were performed as well as 3D (MIP)   reconstructions.    FINDINGS:  CHEST:  LUNGS AND LARGE AIRWAYS: Patent central airways. No pulmonary   consolidation or suspicious nodules.  PLEURA: No pleural effusion.  VESSELS: No pulmonary embolus. Aorta is of normal caliber  HEART: Heart size is normal. No pericardial effusion.  MEDIASTINUM AND EUNICE: No lymphadenopathy.  CHEST WALL AND LOWER NECK: Innumerable rim calcified nodules in bilateral   breasts..    ABDOMEN AND PELVIS:  LIVER: Within normal limits.  BILE DUCTS: Normal caliber.  GALLBLADDER: Within normal limits.  SPLEEN: Within normal limits.  PANCREAS: Within normal limits.  ADRENALS: Within normal limits.  KIDNEYS/URETERS: Innumerable bilateral cysts and subcentimeter   indeterminant hypodense foci that are too small to characterize.   Wedge-shaped focus of hypoenhancement in the interpolar region of the   right kidney. Bilateral urothelial enhancement. No drainable fluid   collection. No hydronephrosis..    BLADDER: Decompressed urinary bladder contains air and a De Guzman catheter.  REPRODUCTIVE ORGANS: Hysterectomy.    BOWEL: Rectal distention with stool. No bowel obstruction. Appendix is   normal.  PERITONEUM: No ascites.  VESSELS: Within normal limits.  RETROPERITONEUM/LYMPH NODES: No lymphadenopathy.  ABDOMINAL WALL: Within normal limits.  BONES: Within normal limits.    IMPRESSION:  No pulmonary consolidation.    Focal area of wedge-shaped hypoenhancement in the interpolar region of   the right kidney with bilateral urothelial enhancement likely reflecting   pyelitis and right-sided focal pyelonephritis. Please correlate with   urinalysis. No drainable fluid collection.    Decompressed urinary bladder.    --- End of Report ---        < end of copied text >          Assessment:  Patient with history of bipolar, has been eating and drinking poorly of late so came to hospital . She was found to have mild covid so had  rdv for 3 days. She was , not hypoxic. She was also found to have a uti and  urinary retention, now found to have evidence of pyelo on ct a/p so will do full 10 d of antibiotics. Has a de guzman for retention.   Plan:  5 more days of ceftin  de guzman for now    Plan:

## 2023-05-13 NOTE — PROGRESS NOTE ADULT - SUBJECTIVE AND OBJECTIVE BOX
SUBJECTIVE / OVERNIGHT EVENTS:  --- COVERAGE for Dr. Espinosa ---  comfortable  "you gotta get me out of here!"  poor historian, forgetful  Na improving  HR still tachy  no cp, no sob, no n/v/d. no abdominal pain.  no headache, no dizziness.     --------------------------------------------------------------------------------------------  LABS:                        16.5   7.87  )-----------( 169      ( 13 May 2023 07:34 )             48.7     05-13    x   |  x   |  x   ----------------------------<  x   x    |  x   |  1.39<H>    Ca    10.0      13 May 2023 07:33  Phos  3.1     05-12  Mg     2.2     05-12    TPro  6.7  /  Alb  3.7  /  TBili  0.5  /  DBili  0.2  /  AST  27  /  ALT  25  /  AlkPhos  93  05-13    PT/INR - ( 13 May 2023 10:07 )   PT: 13.4 sec;   INR: 1.15 ratio           CAPILLARY BLOOD GLUCOSE                RADIOLOGY & ADDITIONAL TESTS:    Imaging Personally Reviewed:  [x] YES  [ ] NO    Consultant(s) Notes Reviewed:  [x] YES  [ ] NO    MEDICATIONS  (STANDING):  amLODIPine   Tablet 5 milliGRAM(s) Oral daily  ARIPiprazole 5 milliGRAM(s) Oral daily  ascorbic acid 500 milliGRAM(s) Oral daily  chlorhexidine 2% Cloths 1 Application(s) Topical daily  dextrose 5%. 1000 milliLiter(s) (60 mL/Hr) IV Continuous <Continuous>  enoxaparin Injectable 40 milliGRAM(s) SubCutaneous every 12 hours  levothyroxine 100 MICROGram(s) Oral daily  LORazepam     Tablet 0.75 milliGRAM(s) Oral at bedtime  multivitamin 1 Tablet(s) Oral daily  senna 2 Tablet(s) Oral at bedtime    MEDICATIONS  (PRN):  LORazepam     Tablet 0.5 milliGRAM(s) Oral every 4 hours PRN CIWA-Ar score 8 or greater      Care Discussed with Consultants/Other Providers [x] YES  [ ] NO    Vital Signs Last 24 Hrs  T(C): 36.8 (13 May 2023 08:52), Max: 37.1 (12 May 2023 21:59)  T(F): 98.2 (13 May 2023 08:52), Max: 98.7 (12 May 2023 21:59)  HR: 112 (13 May 2023 08:52) (104 - 119)  BP: 142/96 (13 May 2023 08:52) (135/92 - 150/79)  BP(mean): --  RR: 18 (13 May 2023 08:52) (18 - 18)  SpO2: 100% (13 May 2023 08:52) (96% - 100%)    Parameters below as of 13 May 2023 08:52  Patient On (Oxygen Delivery Method): room air      I&O's Summary    12 May 2023 07:01  -  13 May 2023 07:00  --------------------------------------------------------  IN: 2580 mL / OUT: 4650 mL / NET: -2070 mL        PHYSICAL EXAM:  GENERAL: NAD, thin-female, comfortable  HEAD:  Atraumatic, Normocephalic  EYES: EOMI, PERRLA, conjunctiva and sclera clear  NECK: Supple, No JVD  CHEST/LUNG: mild decrease breath sounds bilaterally; No wheeze   HEART: Regular rate and rhythm; No murmurs, rubs, or gallops  ABDOMEN: Soft, Nontender, Nondistended; Bowel sounds present  Neuro: AAOx2-3, poor historian, no focal weakness   EXTREMITIES:  2+ Peripheral Pulses, No clubbing, cyanosis, or edema  SKIN: No rashes or lesions

## 2023-05-14 LAB
ANION GAP SERPL CALC-SCNC: 11 MMOL/L — SIGNIFICANT CHANGE UP (ref 5–17)
BUN SERPL-MCNC: 32 MG/DL — HIGH (ref 7–23)
CALCIUM SERPL-MCNC: 9.5 MG/DL — SIGNIFICANT CHANGE UP (ref 8.4–10.5)
CHLORIDE SERPL-SCNC: 110 MMOL/L — HIGH (ref 96–108)
CO2 SERPL-SCNC: 25 MMOL/L — SIGNIFICANT CHANGE UP (ref 22–31)
CREAT SERPL-MCNC: 1.33 MG/DL — HIGH (ref 0.5–1.3)
EGFR: 42 ML/MIN/1.73M2 — LOW
GLUCOSE SERPL-MCNC: 111 MG/DL — HIGH (ref 70–99)
HCT VFR BLD CALC: 47.2 % — HIGH (ref 34.5–45)
HGB BLD-MCNC: 16 G/DL — HIGH (ref 11.5–15.5)
MAGNESIUM SERPL-MCNC: 2.4 MG/DL — SIGNIFICANT CHANGE UP (ref 1.6–2.6)
MCHC RBC-ENTMCNC: 30.8 PG — SIGNIFICANT CHANGE UP (ref 27–34)
MCHC RBC-ENTMCNC: 33.9 GM/DL — SIGNIFICANT CHANGE UP (ref 32–36)
MCV RBC AUTO: 90.9 FL — SIGNIFICANT CHANGE UP (ref 80–100)
NRBC # BLD: 0 /100 WBCS — SIGNIFICANT CHANGE UP (ref 0–0)
PLATELET # BLD AUTO: 172 K/UL — SIGNIFICANT CHANGE UP (ref 150–400)
POTASSIUM SERPL-MCNC: 3.8 MMOL/L — SIGNIFICANT CHANGE UP (ref 3.5–5.3)
POTASSIUM SERPL-SCNC: 3.8 MMOL/L — SIGNIFICANT CHANGE UP (ref 3.5–5.3)
RBC # BLD: 5.19 M/UL — SIGNIFICANT CHANGE UP (ref 3.8–5.2)
RBC # FLD: 12.8 % — SIGNIFICANT CHANGE UP (ref 10.3–14.5)
SODIUM SERPL-SCNC: 146 MMOL/L — HIGH (ref 135–145)
WBC # BLD: 7.12 K/UL — SIGNIFICANT CHANGE UP (ref 3.8–10.5)
WBC # FLD AUTO: 7.12 K/UL — SIGNIFICANT CHANGE UP (ref 3.8–10.5)

## 2023-05-14 RX ADMIN — Medication 500 MILLIGRAM(S): at 11:49

## 2023-05-14 RX ADMIN — ENOXAPARIN SODIUM 30 MILLIGRAM(S): 100 INJECTION SUBCUTANEOUS at 13:47

## 2023-05-14 RX ADMIN — ARIPIPRAZOLE 5 MILLIGRAM(S): 15 TABLET ORAL at 12:11

## 2023-05-14 RX ADMIN — Medication 1 TABLET(S): at 11:49

## 2023-05-14 RX ADMIN — SENNA PLUS 2 TABLET(S): 8.6 TABLET ORAL at 21:05

## 2023-05-14 RX ADMIN — SODIUM CHLORIDE 60 MILLILITER(S): 9 INJECTION, SOLUTION INTRAVENOUS at 11:50

## 2023-05-14 RX ADMIN — Medication 250 MILLIGRAM(S): at 18:36

## 2023-05-14 RX ADMIN — AMLODIPINE BESYLATE 5 MILLIGRAM(S): 2.5 TABLET ORAL at 06:25

## 2023-05-14 RX ADMIN — CHLORHEXIDINE GLUCONATE 1 APPLICATION(S): 213 SOLUTION TOPICAL at 12:12

## 2023-05-14 RX ADMIN — Medication 0.75 MILLIGRAM(S): at 21:05

## 2023-05-14 RX ADMIN — Medication 100 MICROGRAM(S): at 06:24

## 2023-05-14 RX ADMIN — Medication 250 MILLIGRAM(S): at 06:24

## 2023-05-14 NOTE — PROGRESS NOTE ADULT - ASSESSMENT
72-year-old female with history of bipolar disorder and dementia ? hypothyroidism s/p thyroidectomy (had thyroid cancer)   comes into the ED via EMS for failure to thrive.  Spoke with her niece,  Mylene  at [phone], who lives in [place] and is the patient's healthcare proxy.   Pt is a  , lives by herself ... her neighbor would take her food shopping. belongs to [employer] who also help.   she is incoherent and " withdrawn" and confused...  Lost her " common sense"   was not eating or drinking and lost 20lbs   She used to live with her  that he passed away in September 2020.  She was initially depressed for a brief period of time but returned to her baseline and was active until a month ago.  Her niece reports she used to be on lithium for her bipolar but has not been treated for the past 8 years.  she also reports she used to be on valium for " extreme insomnia " which pt had stopped for " sometime...    Currently in the emergency department patient does not complain of any pain or discomfort.  She states she has a decreased appetite and does not have any pain, nausea, vomiting or inability to eat.  Patient is not able to provide much of the history and she keeps stating she feels out of it and is unable to concentrate on anything.    pt was found to have COVID positive RVP.. a month ago she had fever , vomiting    pt is not able to provide any information  partially because she is hard of hearing     - COVID  infection unclear time of exposure   due to recent changes in status : given remdesivir, completed  no indication for dexa, she remain stable on room air  cont to monitor   ID consult appreciated    abd distention; urinary retention   monitor and placed De Guzman per protocol     uti; complete ctx   However, CT abd with right sided pyelo?  ID f/u requested. Restarted Ceftrin PO given CT scan findings of pyelo.     hx of bipolar: has not been on meds and does not seem to be actively psychotic   psych  following    FTT with hypernatremia: ivf D5W  monitor Na    JAYLA : monitor  likely multifactorial including urinary retention, de guzman in place.   renal input     tachycardia/elevated BP : ? sec to infection /hyperthyroid /withdrwal /anxiety/ dehydration ?  cont abx   benzo per psych   decreased synthroid   FU with endo   c/w D5W for hypernatremia    breast lesion: per report ..pt refused exam . Chaperon  present at the time   should follow up with outpt PCP and mammogram nonurgently       DVT ppx: no PE on CTangio. switch back to Lovenox 30 mg qdaily ppx dose    d/w HCP [facility]  will fu final decision but does not seem to want her to be resuscitated ..had not signed papers in past

## 2023-05-14 NOTE — PROGRESS NOTE ADULT - SUBJECTIVE AND OBJECTIVE BOX
SUBJECTIVE / OVERNIGHT EVENTS:  --- COVERAGE for Dr. Espinosa ---  yaritza.  gena historian  Na improving on D5W  no cp, no sob, no n/v/d. no abdominal pain.  no headache, no dizziness.         --------------------------------------------------------------------------------------------  LABS:                        16.0   7.12  )-----------( 172      ( 14 May 2023 07:00 )             47.2     05-14    146<H>  |  110<H>  |  32<H>  ----------------------------<  111<H>  3.8   |  25  |  1.33<H>    Ca    9.5      14 May 2023 07:00  Mg     2.4     05-14    TPro  6.7  /  Alb  3.7  /  TBili  0.5  /  DBili  0.2  /  AST  27  /  ALT  25  /  AlkPhos  93  05-13    PT/INR - ( 13 May 2023 10:07 )   PT: 13.4 sec;   INR: 1.15 ratio           CAPILLARY BLOOD GLUCOSE                RADIOLOGY & ADDITIONAL TESTS:    Imaging Personally Reviewed:  [x] YES  [ ] NO    Consultant(s) Notes Reviewed:  [x] YES  [ ] NO    MEDICATIONS  (STANDING):  amLODIPine   Tablet 5 milliGRAM(s) Oral daily  ARIPiprazole 5 milliGRAM(s) Oral daily  ascorbic acid 500 milliGRAM(s) Oral daily  cefuroxime   Tablet 250 milliGRAM(s) Oral every 12 hours  chlorhexidine 2% Cloths 1 Application(s) Topical daily  dextrose 5%. 1000 milliLiter(s) (60 mL/Hr) IV Continuous <Continuous>  enoxaparin Injectable 30 milliGRAM(s) SubCutaneous every 24 hours  levothyroxine 100 MICROGram(s) Oral daily  LORazepam     Tablet 0.75 milliGRAM(s) Oral at bedtime  multivitamin 1 Tablet(s) Oral daily  senna 2 Tablet(s) Oral at bedtime    MEDICATIONS  (PRN):  LORazepam     Tablet 0.5 milliGRAM(s) Oral every 4 hours PRN CIWA-Ar score 8 or greater      Care Discussed with Consultants/Other Providers [x] YES  [ ] NO    Vital Signs Last 24 Hrs  T(C): 36.5 (14 May 2023 09:25), Max: 37.1 (14 May 2023 01:13)  T(F): 97.7 (14 May 2023 09:25), Max: 98.8 (14 May 2023 01:13)  HR: 102 (14 May 2023 09:25) (102 - 113)  BP: 133/85 (14 May 2023 09:25) (117/84 - 137/88)  BP(mean): --  RR: 18 (14 May 2023 09:25) (18 - 18)  SpO2: 100% (14 May 2023 09:25) (96% - 100%)    Parameters below as of 14 May 2023 09:25  Patient On (Oxygen Delivery Method): room air      I&O's Summary    13 May 2023 07:01  -  14 May 2023 07:00  --------------------------------------------------------  IN: 2140 mL / OUT: 3400 mL / NET: -1260 mL    14 May 2023 07:01  -  14 May 2023 11:47  --------------------------------------------------------  IN: 320 mL / OUT: 325 mL / NET: -5 mL          PHYSICAL EXAM:  GENERAL: NAD, thin-female, comfortable  HEAD:  Atraumatic, Normocephalic  EYES: EOMI, PERRLA, conjunctiva and sclera clear  NECK: Supple, No JVD  CHEST/LUNG: mild decrease breath sounds bilaterally; No wheeze   HEART: Regular rate and rhythm; No murmurs, rubs, or gallops  ABDOMEN: Soft, Nontender, Nondistended; Bowel sounds present  Neuro: AAOx2-3, poor historian, no focal weakness   EXTREMITIES:  2+ Peripheral Pulses, No clubbing, cyanosis, or edema  SKIN: No rashes or lesions

## 2023-05-14 NOTE — PROGRESS NOTE ADULT - ASSESSMENT
Assessment  72y female with history of thyroid cancer, bipolar d/o, lives alone and by report has been very withdrawn, very poor  po intake and has recent weight loss, no overnighrt events.  Hypothyroidism: On Synthroid 112 mcg po daily, TSH 0.06, FT4 2.2. Now Hyperthyroid. Thyroxine adjusted, Hx of Thyroidectomy due to thyroid cancer.  Thyroglobulin/AB negative.   COVID+: supportive care, on remdesivir. On isolation           Discussed plan and management with Dr Blanca Sharp NP - TEAMS  Sloan Rios MD  Cell: [phone]  Office: [phone]                Assessment  72y female with history of thyroid cancer, bipolar d/o, lives alone and by report has been very withdrawn, very poor  po intake and has recent weight loss, no overnighrt events.  Hypothyroidism: On Synthroid 112 mcg po daily, TSH 0.06, FT4 2.2. Now Hyperthyroid. Thyroxine adjusted, Hx of Thyroidectomy due to thyroid cancer.  Thyroglobulin/AB negative.   COVID+: supportive care, on remdesivir. On isolation   Hypercalcemia: mild, dehydration  resolved s/p IVF hydration.         Discussed plan and management with Dr Blanca Sharp NP - TEAMS  Sloan Rios MD  Cell: [phone]  Office: [phone]                Assessment  72y female with history of thyroid cancer, bipolar d/o, lives alone and by report has been very withdrawn, very poor  po intake and has recent weight loss, no  overnighrt events.  Hypothyroidism: On Synthroid 112 mcg po daily, TSH 0.06, FT4 2.2. Now Hyperthyroid. Thyroxine adjusted, Hx of Thyroidectomy due to thyroid cancer.  Thyroglobulin/AB negative.   COVID+: supportive care, on remdesivir. On isolation   Hypercalcemia: mild, dehydration  resolved s/p IVF hydration.         Discussed plan and management with Dr Blanca Sharp NP - TEAMS  Sloan Rios MD  Cell: [phone]  Office: [phone]

## 2023-05-14 NOTE — PROGRESS NOTE ADULT - SUBJECTIVE AND OBJECTIVE BOX
CC: f/u for pyelo    Patient reports she does not know how to use the phone    REVIEW OF SYSTEMS:  All other review of systems negative (Comprehensive ROS)    Antimicrobials Day #  :6/10  cefuroxime   Tablet 250 milliGRAM(s) Oral every 12 hours    Other Medications Reviewed    T(F): 98.1 (05-14-23 @ 17:00), Max: 98.8 (05-14-23 @ 01:13)  HR: 110 (05-14-23 @ 17:00)  BP: 134/85 (05-14-23 @ 17:00)  RR: 18 (05-14-23 @ 17:00)  SpO2: 99% (05-14-23 @ 17:00)  Wt(kg): --    PHYSICAL EXAM:  General: alert, no acute distress  Eyes:  anicteric, no conjunctival injection, no discharge  Oropharynx: no lesions or injection 	  Neck: supple, without adenopathy  Lungs: clear to auscultation  Heart: regular rate and rhythm; no murmur, rubs or gallops  Abdomen: soft, nondistended, nontender, without mass or organomegaly  Skin: no lesions  Extremities: no clubbing, cyanosis, or edema  Neurologic: alert, oriented, moves all extremities but still confused    LAB RESULTS:                        16.0   7.12  )-----------( 172      ( 14 May 2023 07:00 )             47.2     05-14    146<H>  |  110<H>  |  32<H>  ----------------------------<  111<H>  3.8   |  25  |  1.33<H>    Ca    9.5      14 May 2023 07:00  Mg     2.4     05-14    TPro  6.7  /  Alb  3.7  /  TBili  0.5  /  DBili  0.2  /  AST  27  /  ALT  25  /  AlkPhos  93  05-13    LIVER FUNCTIONS - ( 13 May 2023 10:07 )  Alb: 3.7 g/dL / Pro: 6.7 g/dL / ALK PHOS: 93 U/L / ALT: 25 U/L / AST: 27 U/L / GGT: x             MICROBIOLOGY:  RECENT CULTURES:      RADIOLOGY REVIEWED:      ACC: [id number] EXAM:  CT ABDOMEN AND PELVIS OC IC   ORDERED BY: SUNNY CONTI     ACC: [id number] EXAM:  CT ANGIO CHEST PULM ART [facility]   ORDERED BY: JESSICA STEPHENS     PROCEDURE DATE:  05/12/2023          INTERPRETATION:  CLINICAL INFORMATION: COVID- positive. Abdominal   distention and urinary retention. UTI.    COMPARISON: None.    CONTRAST/COMPLICATIONS:  IV Contrast: IV contrast documented in unlinked concurrent exam  Oral Contrast: Omnipaque 300  Complications: None reported at time of study completion    PROCEDURE:  CT Angiography of the Chest was performed followed by portal venous phase   imaging of the Abdomen and Pelvis.  Sagittal and coronal reformats were performed as well as 3D (MIP)   reconstructions.    FINDINGS:  CHEST:  LUNGS AND LARGE AIRWAYS: Patent central airways. No pulmonary   consolidation or suspicious nodules.  PLEURA: No pleural effusion.  VESSELS: No pulmonary embolus. Aorta is of normal caliber  HEART: Heart size is normal. No pericardial effusion.  MEDIASTINUM AND EUNICE: No lymphadenopathy.  CHEST WALL AND LOWER NECK: Innumerable rim calcified nodules in bilateral   breasts..    ABDOMEN AND PELVIS:  LIVER: Within normal limits.  BILE DUCTS: Normal caliber.  GALLBLADDER: Within normal limits.  SPLEEN: Within normal limits.  PANCREAS: Within normal limits.  ADRENALS: Within normal limits.  KIDNEYS/URETERS: Innumerable bilateral cysts and subcentimeter   indeterminant hypodense foci that are too small to characterize.   Wedge-shaped focus of hypoenhancement in the interpolar region of the   right kidney. Bilateral urothelial enhancement. No drainable fluid   collection. No hydronephrosis..    BLADDER: Decompressed urinary bladder contains air and a De Guzman catheter.  REPRODUCTIVE ORGANS: Hysterectomy.    BOWEL: Rectal distention with stool. No bowel obstruction. Appendix is   normal.  PERITONEUM: No ascites.  VESSELS: Within normal limits.  RETROPERITONEUM/LYMPH NODES: No lymphadenopathy.  ABDOMINAL WALL: Within normal limits.  BONES: Within normal limits.    IMPRESSION:  No pulmonary consolidation.    Focal area of wedge-shaped hypoenhancement in the interpolar region of   the right kidney with bilateral urothelial enhancement likely reflecting   pyelitis and right-sided focal pyelonephritis. Please correlate with   urinalysis. No drainable fluid collection.    Decompressed urinary bladder.            Assessment:  Patient with history of bipolar, has been eating and drinking poorly of late so came to hospital . She was found to have mild covid so had  rdv for 3 days. She was  not hypoxic. She was also found to have a uti and  urinary retention, now found to have evidence of pyelo on ct a/p so will do full 10 d of antibiotics. Has a de guzman for retention.   Plan:  4 more days of ceftin  de guzman for now

## 2023-05-14 NOTE — PROGRESS NOTE ADULT - NSPROGADDITIONALINFOA_GEN_ALL_CORE
d/w pt and PA.    --- Coverage for Dr. Espinosa ---  - Dr. LEIDA Reynoso ([facility])  - [phone]
d/w pt and PA.    --- Coverage for Dr. Espinosa ---  - Dr. LEIDA Reynoso ([facility])  - [phone]

## 2023-05-14 NOTE — PROGRESS NOTE ADULT - SUBJECTIVE AND OBJECTIVE BOX
Chief complaint  Patient is a 72y old  Female who presents with a chief complaint of FTT (13 May 2023 19:22)         Labs and Fingersticks  CAPILLARY BLOOD GLUCOSE          Anion Gap, Serum: 11 (05-14 @ 07:00)  Anion Gap, Serum: 11 (05-13 @ 07:33)      Calcium, Total Serum: 9.5 (05-14 @ 07:00)  Calcium, Total Serum: 10.0 (05-13 @ 07:33)  Albumin, Serum: 3.7 (05-13 @ 10:07)  Albumin, Serum: 3.4 (05-13 @ 07:33)    Alanine Aminotransferase (ALT/SGPT): 25 (05-13 @ 10:07)  Alanine Aminotransferase (ALT/SGPT): 24 (05-13 @ 07:33)  Alkaline Phosphatase, Serum: 93 (05-13 @ 10:07)  Alkaline Phosphatase, Serum: 93 (05-13 @ 07:33)  Aspartate Aminotransferase (AST/SGOT): 27 (05-13 @ 10:07)  Aspartate Aminotransferase (AST/SGOT): 24 (05-13 @ 07:33)        05-14    146<H>  |  110<H>  |  32<H>  ----------------------------<  111<H>  3.8   |  25  |  1.33<H>    Ca    9.5      14 May 2023 07:00  Mg     2.4     05-14    TPro  6.7  /  Alb  3.7  /  TBili  0.5  /  DBili  0.2  /  AST  27  /  ALT  25  /  AlkPhos  93  05-13                        16.0   7.12  )-----------( 172      ( 14 May 2023 07:00 )             47.2     Medications  MEDICATIONS  (STANDING):  amLODIPine   Tablet 5 milliGRAM(s) Oral daily  ARIPiprazole 5 milliGRAM(s) Oral daily  ascorbic acid 500 milliGRAM(s) Oral daily  cefuroxime   Tablet 250 milliGRAM(s) Oral every 12 hours  chlorhexidine 2% Cloths 1 Application(s) Topical daily  dextrose 5%. 1000 milliLiter(s) (60 mL/Hr) IV Continuous <Continuous>  enoxaparin Injectable 30 milliGRAM(s) SubCutaneous every 24 hours  levothyroxine 100 MICROGram(s) Oral daily  LORazepam     Tablet 0.75 milliGRAM(s) Oral at bedtime  multivitamin 1 Tablet(s) Oral daily  senna 2 Tablet(s) Oral at bedtime      Physical Exam  General: Patient comfortable in bed   Vital Signs Last 12 Hrs  T(F): 98.2 (05-14-23 @ 13:05), Max: 98.4 (05-14-23 @ 06:47)  HR: 105 (05-14-23 @ 13:05) (102 - 105)  BP: 127/82 (05-14-23 @ 13:05) (127/82 - 137/88)  BP(mean): --  RR: 18 (05-14-23 @ 13:05) (18 - 18)  SpO2: 97% (05-14-23 @ 13:05) (97% - 100%)    CVS: S1S2   Respiratory: No wheezing, no crepitations  GI: Abdomen soft, bowel sounds positive  Musculoskeletal:  moves all extremities  : Voiding          Chief complaint  Patient is a 72y old  Female who presents with a chief complaint of FTT (13 May 2023 19:22)       Labs and Fingersticks  CAPILLARY BLOOD GLUCOSE          Anion Gap, Serum: 11 (05-14 @ 07:00)  Anion Gap, Serum: 11 (05-13 @ 07:33)      Calcium, Total Serum: 9.5 (05-14 @ 07:00)  Calcium, Total Serum: 10.0 (05-13 @ 07:33)  Albumin, Serum: 3.7 (05-13 @ 10:07)  Albumin, Serum: 3.4 (05-13 @ 07:33)    Alanine Aminotransferase (ALT/SGPT): 25 (05-13 @ 10:07)  Alanine Aminotransferase (ALT/SGPT): 24 (05-13 @ 07:33)  Alkaline Phosphatase, Serum: 93 (05-13 @ 10:07)  Alkaline Phosphatase, Serum: 93 (05-13 @ 07:33)  Aspartate Aminotransferase (AST/SGOT): 27 (05-13 @ 10:07)  Aspartate Aminotransferase (AST/SGOT): 24 (05-13 @ 07:33)        05-14    146<H>  |  110<H>  |  32<H>  ----------------------------<  111<H>  3.8   |  25  |  1.33<H>    Ca    9.5      14 May 2023 07:00  Mg     2.4     05-14    TPro  6.7  /  Alb  3.7  /  TBili  0.5  /  DBili  0.2  /  AST  27  /  ALT  25  /  AlkPhos  93  05-13                        16.0   7.12  )-----------( 172      ( 14 May 2023 07:00 )             47.2     Medications  MEDICATIONS  (STANDING):  amLODIPine   Tablet 5 milliGRAM(s) Oral daily  ARIPiprazole 5 milliGRAM(s) Oral daily  ascorbic acid 500 milliGRAM(s) Oral daily  cefuroxime   Tablet 250 milliGRAM(s) Oral every 12 hours  chlorhexidine 2% Cloths 1 Application(s) Topical daily  dextrose 5%. 1000 milliLiter(s) (60 mL/Hr) IV Continuous <Continuous>  enoxaparin Injectable 30 milliGRAM(s) SubCutaneous every 24 hours  levothyroxine 100 MICROGram(s) Oral daily  LORazepam     Tablet 0.75 milliGRAM(s) Oral at bedtime  multivitamin 1 Tablet(s) Oral daily  senna 2 Tablet(s) Oral at bedtime      Physical Exam  General: Patient comfortable in bed   Vital Signs Last 12 Hrs  T(F): 98.2 (05-14-23 @ 13:05), Max: 98.4 (05-14-23 @ 06:47)  HR: 105 (05-14-23 @ 13:05) (102 - 105)  BP: 127/82 (05-14-23 @ 13:05) (127/82 - 137/88)  BP(mean): --  RR: 18 (05-14-23 @ 13:05) (18 - 18)  SpO2: 97% (05-14-23 @ 13:05) (97% - 100%)    CVS: S1S2   Respiratory: No wheezing, no crepitations  GI: Abdomen soft, bowel sounds positive  Musculoskeletal:  moves all extremities  : Voiding

## 2023-05-15 LAB
ALBUMIN SERPL ELPH-MCNC: 3 G/DL — LOW (ref 3.3–5)
ALP SERPL-CCNC: 91 U/L — SIGNIFICANT CHANGE UP (ref 40–120)
ALT FLD-CCNC: 36 U/L — SIGNIFICANT CHANGE UP (ref 10–45)
ANION GAP SERPL CALC-SCNC: 13 MMOL/L — SIGNIFICANT CHANGE UP (ref 5–17)
AST SERPL-CCNC: 41 U/L — HIGH (ref 10–40)
BILIRUB DIRECT SERPL-MCNC: 0.2 MG/DL — SIGNIFICANT CHANGE UP (ref 0–0.3)
BILIRUB INDIRECT FLD-MCNC: 0.4 MG/DL — SIGNIFICANT CHANGE UP (ref 0.2–1)
BILIRUB SERPL-MCNC: 0.6 MG/DL — SIGNIFICANT CHANGE UP (ref 0.2–1.2)
BUN SERPL-MCNC: 28 MG/DL — HIGH (ref 7–23)
CALCIUM SERPL-MCNC: 9.4 MG/DL — SIGNIFICANT CHANGE UP (ref 8.4–10.5)
CHLORIDE SERPL-SCNC: 103 MMOL/L — SIGNIFICANT CHANGE UP (ref 96–108)
CO2 SERPL-SCNC: 24 MMOL/L — SIGNIFICANT CHANGE UP (ref 22–31)
CREAT SERPL-MCNC: 1.28 MG/DL — SIGNIFICANT CHANGE UP (ref 0.5–1.3)
CREAT SERPL-MCNC: 1.32 MG/DL — HIGH (ref 0.5–1.3)
EGFR: 42 ML/MIN/1.73M2 — LOW
EGFR: 44 ML/MIN/1.73M2 — LOW
GLUCOSE SERPL-MCNC: 106 MG/DL — HIGH (ref 70–99)
INR BLD: 1.04 RATIO — SIGNIFICANT CHANGE UP (ref 0.88–1.16)
POTASSIUM SERPL-MCNC: 3.8 MMOL/L — SIGNIFICANT CHANGE UP (ref 3.5–5.3)
POTASSIUM SERPL-SCNC: 3.8 MMOL/L — SIGNIFICANT CHANGE UP (ref 3.5–5.3)
PROT SERPL-MCNC: 5.9 G/DL — LOW (ref 6–8.3)
PROTHROM AB SERPL-ACNC: 12.1 SEC — SIGNIFICANT CHANGE UP (ref 10.5–13.4)
SODIUM SERPL-SCNC: 140 MMOL/L — SIGNIFICANT CHANGE UP (ref 135–145)

## 2023-05-15 RX ADMIN — ENOXAPARIN SODIUM 30 MILLIGRAM(S): 100 INJECTION SUBCUTANEOUS at 13:28

## 2023-05-15 RX ADMIN — Medication 100 MICROGRAM(S): at 05:54

## 2023-05-15 RX ADMIN — AMLODIPINE BESYLATE 5 MILLIGRAM(S): 2.5 TABLET ORAL at 05:54

## 2023-05-15 RX ADMIN — Medication 500 MILLIGRAM(S): at 13:27

## 2023-05-15 RX ADMIN — SENNA PLUS 2 TABLET(S): 8.6 TABLET ORAL at 21:20

## 2023-05-15 RX ADMIN — Medication 1 TABLET(S): at 13:27

## 2023-05-15 RX ADMIN — Medication 250 MILLIGRAM(S): at 05:54

## 2023-05-15 RX ADMIN — Medication 250 MILLIGRAM(S): at 16:29

## 2023-05-15 RX ADMIN — Medication 0.75 MILLIGRAM(S): at 21:20

## 2023-05-15 RX ADMIN — CHLORHEXIDINE GLUCONATE 1 APPLICATION(S): 213 SOLUTION TOPICAL at 13:27

## 2023-05-15 RX ADMIN — ARIPIPRAZOLE 5 MILLIGRAM(S): 15 TABLET ORAL at 13:28

## 2023-05-15 NOTE — BH CONSULTATION LIAISON PROGRESS NOTE - NSBHFUPINTERVALHXFT_PSY_A_CORE
RN reports the pt. tried climbing out of bed in the middle of the night and is slow in responses. Less paranoia. Eating well.

## 2023-05-15 NOTE — PROGRESS NOTE ADULT - ASSESSMENT
72-year-old female with history of bipolar disorder and dementia ? hypothyroidism s/p thyroidectomy (had thyroid cancer)   comes into the ED via EMS for failure to thrive.  Spoke with her niece,  Mylene  at [phone], who lives in [place] and is the patient's healthcare proxy.   Pt is a  , lives by herself ... her neighbor would take her food shopping. belongs to [employer] who also help.   she is incoherent and " withdrawn" and confused...  Lost her " common sense"   was not eating or drinking and lost 20lbs   She used to live with her  that he passed away in September 2020.  She was initially depressed for a brief period of time but returned to her baseline and was active until a month ago.  Her niece reports she used to be on lithium for her bipolar but has not been treated for the past 8 years.  she also reports she used to be on valium for " extreme insomnia " which pt had stopped for " sometime...    Currently in the emergency department patient does not complain of any pain or discomfort.  She states she has a decreased appetite and does not have any pain, nausea, vomiting or inability to eat.  Patient is not able to provide much of the history and she keeps stating she feels out of it and is unable to concentrate on anything.    pt was found to have COVID positive RVP.. a month ago she had fever , vomiting    pt is not able to provide any information  partially because she is hard of hearing     - COVID  infection unclear time of exposure   due to recent changes in status : given remdesivir, completed  no indication for dexa, she remain stable on room air  cont to monitor   ID consult appreciated    abd distention; urinary retention   monitor and placed De Guzman per protocol   TOV     uti; complete ctx   However, CT abd with right sided pyelo?  ID f/u requested. Restarted Ceftrin PO given CT scan findings of pyelo.     hx of bipolar: has not been on meds and does not seem to be actively psychotic   psych  following    FTT with hypernatremia: ivf D5W  monitor Na    JAYLA : monitor  likely multifactorial including urinary retention, de guzman in place. mimproving   renal input     tachycardia/elevated BP : ? sec to infection /hyperthyroid /withdrwal /anxiety/ dehydration ?  cont abx   benzo per psych   decreased synthroid   FU with endo   c/w D5W for hypernatremia    breast lesion: per report ..pt refused exam . Chaperon  present at the time   should follow up with outpt PCP and mammogram nonurgently       DVT ppx: no PE on CTangio. switch back to Lovenox 30 mg qdaily ppx dose    d/w HCP C  will fu final decision but does not seem to want her to be resuscitated ..had not signed papers in past

## 2023-05-15 NOTE — PROGRESS NOTE ADULT - SUBJECTIVE AND OBJECTIVE BOX
[facility] [phone]        Patient seen and examined up in chair, tolerating diet.         MEDICATIONS  (STANDING):  amLODIPine   Tablet 5 milliGRAM(s) Oral daily  ARIPiprazole 5 milliGRAM(s) Oral daily  ascorbic acid 500 milliGRAM(s) Oral daily  cefuroxime   Tablet 250 milliGRAM(s) Oral every 12 hours  chlorhexidine 2% Cloths 1 Application(s) Topical daily  dextrose 5%. 1000 milliLiter(s) (60 mL/Hr) IV Continuous <Continuous>  enoxaparin Injectable 30 milliGRAM(s) SubCutaneous every 24 hours  levothyroxine 100 MICROGram(s) Oral daily  LORazepam     Tablet 0.75 milliGRAM(s) Oral at bedtime  multivitamin 1 Tablet(s) Oral daily  senna 2 Tablet(s) Oral at bedtime      VITAL:  T(C): , Max: 37.3 (05-15-23 @ 04:40)  T(F): , Max: 99.1 (05-15-23 @ 04:40)  HR: 112 (05-15-23 @ 09:37)  BP: 117/72 (05-15-23 @ 09:37)  BP(mean): --  RR: 18 (05-15-23 @ 09:37)  SpO2: 98% (05-15-23 @ 09:37)  Wt(kg): --    I and O's:    05-14 @ 07:01  -  05-15 @ 07:00  --------------------------------------------------------  IN: 1420 mL / OUT: 3075 mL / NET: -1655 mL    05-15 @ 07:01  -  05-15 @ 13:07  --------------------------------------------------------  IN: 240 mL / OUT: 400 mL / NET: -160 mL          PHYSICAL EXAM:    Constitutional: NAD  Neck:  No JVD  Respiratory: CTAB/L  Cardiovascular: tachy S1 and S2  Gastrointestinal: BS+, soft, NT/ND  Extremities: No peripheral edema  Neurological: confused   Psychiatric: Normal mood, normal affect  : + Carranza  Skin: No rashes  Access: Not applicable    LABS:                        16.0   7.12  )-----------( 172      ( 14 May 2023 07:00 )             47.2     05-15    140  |  103  |  28<H>  ----------------------------<  106<H>  3.8   |  24  |  1.32<H>    Ca    9.4      15 May 2023 07:02  Mg     2.4     05-14    TPro  5.9<L>  /  Alb  3.0<L>  /  TBili  0.6  /  DBili  0.2  /  AST  41<H>  /  ALT  36  /  AlkPhos  91  05-15      Urine osmo 245, NA 48, Creat 20, K 23, Cl 42    RADIOLOGY:    < from: US Kidney and Bladder (05.12.23 @ 17:04) >  FINDINGS:  Right kidney: Echogenic renal parenchyma.  11 cm. No renal mass,   hydronephrosis or calculus.  Numerous cysts measure up to 1.4 x 1 x 1.2   cm in the upper pole.    Left kidney: Echogenic renal parenchyma.  10 cm. No renal mass,   hydronephrosis or calculus.  Numerous cysts measure up to 2.8 x 1.9 x 1.9   cm in the lower pole.    Urinary bladder: Carranza catheter in place.  Partially distended with   volume of 366 mL.  No sonographic abnormality.    IMPRESSION:  Both kidneys are echogenic, compatible with medical renal disease.  Numerous cysts in both kidneys.  No renal mass, hydronephrosis or calculus is visualized sonographically.  Carranza catheter in place.  Partially distended bladder with volume of 366   mL.    < end of copied text >        ASSESSMENT:  (1)Renal - CKD3 - likely from hypertensive nephrosclerosis    (2)Hypernatremia - in setting of poor free water intake/high insensible losses/renal impairment with associated limitation in urinary concentrating ability. Low urine osm on admission despite hypernatremia. Does she have chronic diabetes insipidus? Possible that the hypercalcemia from admission led to the low urine SG at that time.    (3)Hypercalcemia - mild - improved since admission with IVF    (4)CV - likely longstanding hypertension      RECOMMEND:  (1)Stop IVF D5W@60cc/h  (2)Continue Norvasc 5mg po qd  (3)Encourage oral intake  (4)Follow up SPEP/immunofixation   (5)BMP daily   (6)Dose new meds for GFR 35-40ml/min    Shamika Forbes NP  [facility]  Office: [phone]             [facility] [phone]        Patient seen and examined up in chair, tolerating diet.         MEDICATIONS  (STANDING):  amLODIPine   Tablet 5 milliGRAM(s) Oral daily  ARIPiprazole 5 milliGRAM(s) Oral daily  ascorbic acid 500 milliGRAM(s) Oral daily  cefuroxime   Tablet 250 milliGRAM(s) Oral every 12 hours  chlorhexidine 2% Cloths 1 Application(s) Topical daily  dextrose 5%. 1000 milliLiter(s) (60 mL/Hr) IV Continuous <Continuous>  enoxaparin Injectable 30 milliGRAM(s) SubCutaneous every 24 hours  levothyroxine 100 MICROGram(s) Oral daily  LORazepam     Tablet 0.75 milliGRAM(s) Oral at bedtime  multivitamin 1 Tablet(s) Oral daily  senna 2 Tablet(s) Oral at bedtime      VITAL:  T(C): , Max: 37.3 (05-15-23 @ 04:40)  T(F): , Max: 99.1 (05-15-23 @ 04:40)  HR: 112 (05-15-23 @ 09:37)  BP: 117/72 (05-15-23 @ 09:37)  BP(mean): --  RR: 18 (05-15-23 @ 09:37)  SpO2: 98% (05-15-23 @ 09:37)  Wt(kg): --    I and O's:    05-14 @ 07:01  -  05-15 @ 07:00  --------------------------------------------------------  IN: 1420 mL / OUT: 3075 mL / NET: -1655 mL    05-15 @ 07:01  -  05-15 @ 13:07  --------------------------------------------------------  IN: 240 mL / OUT: 400 mL / NET: -160 mL          PHYSICAL EXAM:    Constitutional: NAD  Neck:  No JVD  Respiratory: CTAB/L  Cardiovascular: tachy S1 and S2  Gastrointestinal: BS+, soft, NT/ND  Extremities: No peripheral edema  Neurological: confused   Psychiatric: Normal mood, normal affect  : + Carranza  Skin: No rashes  Access: Not applicable    LABS:                        16.0   7.12  )-----------( 172      ( 14 May 2023 07:00 )             47.2     05-15    140  |  103  |  28<H>  ----------------------------<  106<H>  3.8   |  24  |  1.32<H>    Ca    9.4      15 May 2023 07:02  Mg     2.4     05-14    TPro  5.9<L>  /  Alb  3.0<L>  /  TBili  0.6  /  DBili  0.2  /  AST  41<H>  /  ALT  36  /  AlkPhos  91  05-15      Urine osmo 245, NA 48, Creat 20, K 23, Cl 42    RADIOLOGY:    < from: US Kidney and Bladder (05.12.23 @ 17:04) >  FINDINGS:  Right kidney: Echogenic renal parenchyma.  11 cm. No renal mass,   hydronephrosis or calculus.  Numerous cysts measure up to 1.4 x 1 x 1.2   cm in the upper pole.    Left kidney: Echogenic renal parenchyma.  10 cm. No renal mass,   hydronephrosis or calculus.  Numerous cysts measure up to 2.8 x 1.9 x 1.9   cm in the lower pole.    Urinary bladder: Carranza catheter in place.  Partially distended with   volume of 366 mL.  No sonographic abnormality.    IMPRESSION:  Both kidneys are echogenic, compatible with medical renal disease.  Numerous cysts in both kidneys.  No renal mass, hydronephrosis or calculus is visualized sonographically.  Carranza catheter in place.  Partially distended bladder with volume of 366   mL.    < end of copied text >      < from: CT Angio Chest PE Protocol w/ IV Cont (05.12.23 @ 21:06) >  IMPRESSION:  No pulmonary consolidation.    Focal area of wedge-shaped hypoenhancement in the interpolar region of   the right kidney with bilateral urothelial enhancement likely reflecting   pyelitis and right-sided focal pyelonephritis. Please correlate with   urinalysis. No drainable fluid collection.    Decompressed urinary bladder.    < end of copied text >    ASSESSMENT:  72 year-old woman with history only notable for thyroid cancer s/p resection, with resultant hypothyroidism. Presents withwith loss of appetite, associated 20lb weight loss, insomnia, and worsening confusion.     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 Low urine osm on admission despite hypernatremia. Does she have chronic diabetes insipidus? Possible that the hypercalcemia from admission led to the low urine SG at that time.    (3)Hypercalcemia - mild - improved since admission with IVF    (4)CV - likely longstanding hypertension      RECOMMEND:  (1)Stop IVF D5W@60cc/h  (2)Continue Norvasc 5mg po qd  (3)Encourage oral intake  (4)Follow up SPEP/immunofixation   (5)BMP daily   (6)Dose new meds for GFR 35-40ml/min    Shamika Forbes NP  [facility]  Office: [phone]             [facility] [phone]        Patient seen and examined up in chair, tolerating diet.         MEDICATIONS  (STANDING):  amLODIPine   Tablet 5 milliGRAM(s) Oral daily  ARIPiprazole 5 milliGRAM(s) Oral daily  ascorbic acid 500 milliGRAM(s) Oral daily  cefuroxime   Tablet 250 milliGRAM(s) Oral every 12 hours  chlorhexidine 2% Cloths 1 Application(s) Topical daily  dextrose 5%. 1000 milliLiter(s) (60 mL/Hr) IV Continuous <Continuous>  enoxaparin Injectable 30 milliGRAM(s) SubCutaneous every 24 hours  levothyroxine 100 MICROGram(s) Oral daily  LORazepam     Tablet 0.75 milliGRAM(s) Oral at bedtime  multivitamin 1 Tablet(s) Oral daily  senna 2 Tablet(s) Oral at bedtime      VITAL:  T(C): , Max: 37.3 (05-15-23 @ 04:40)  T(F): , Max: 99.1 (05-15-23 @ 04:40)  HR: 112 (05-15-23 @ 09:37)  BP: 117/72 (05-15-23 @ 09:37)  BP(mean): --  RR: 18 (05-15-23 @ 09:37)  SpO2: 98% (05-15-23 @ 09:37)  Wt(kg): --    I and O's:    05-14 @ 07:01  -  05-15 @ 07:00  --------------------------------------------------------  IN: 1420 mL / OUT: 3075 mL / NET: -1655 mL    05-15 @ 07:01  -  05-15 @ 13:07  --------------------------------------------------------  IN: 240 mL / OUT: 400 mL / NET: -160 mL          PHYSICAL EXAM:    Constitutional: NAD  Neck:  No JVD  Respiratory: CTAB/L  Cardiovascular: tachy S1 and S2  Gastrointestinal: BS+, soft, NT/ND  Extremities: No peripheral edema  Neurological: confused   Psychiatric: Normal mood, normal affect  : + Carranza  Skin: No rashes  Access: Not applicable    LABS:                        16.0   7.12  )-----------( 172      ( 14 May 2023 07:00 )             47.2     05-15    140  |  103  |  28<H>  ----------------------------<  106<H>  3.8   |  24  |  1.32<H>    Ca    9.4      15 May 2023 07:02  Mg     2.4     05-14    TPro  5.9<L>  /  Alb  3.0<L>  /  TBili  0.6  /  DBili  0.2  /  AST  41<H>  /  ALT  36  /  AlkPhos  91  05-15      Urine osmo 245, NA 48, Creat 20, K 23, Cl 42    RADIOLOGY:    < from: US Kidney and Bladder (05.12.23 @ 17:04) >  FINDINGS:  Right kidney: Echogenic renal parenchyma.  11 cm. No renal mass,   hydronephrosis or calculus.  Numerous cysts measure up to 1.4 x 1 x 1.2   cm in the upper pole.    Left kidney: Echogenic renal parenchyma.  10 cm. No renal mass,   hydronephrosis or calculus.  Numerous cysts measure up to 2.8 x 1.9 x 1.9   cm in the lower pole.    Urinary bladder: Carranza catheter in place.  Partially distended with   volume of 366 mL.  No sonographic abnormality.    IMPRESSION:  Both kidneys are echogenic, compatible with medical renal disease.  Numerous cysts in both kidneys.  No renal mass, hydronephrosis or calculus is visualized sonographically.  Carranza catheter in place.  Partially distended bladder with volume of 366   mL.    < end of copied text >      < from: CT Angio Chest PE Protocol w/ IV Cont (05.12.23 @ 21:06) >  IMPRESSION:  No pulmonary consolidation.    Focal area of wedge-shaped hypoenhancement in the interpolar region of   the right kidney with bilateral urothelial enhancement likely reflecting   pyelitis and right-sided focal pyelonephritis. Please correlate with   urinalysis. No drainable fluid collection.    Decompressed urinary bladder.    < end of copied text >    ASSESSMENT:  72 year-old woman with history only notable for thyroid cancer s/p resection, with resultant hypothyroidism. Presents withwith loss of appetite, associated 20lb weight loss, insomnia, and worsening confusion.     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 Low urine osm on admission despite hypernatremia. Does she have chronic diabetes insipidus? Possible that the hypercalcemia from admission led to the low urine SG at that time.    (3)Hypercalcemia - mild - improved since admission with IVF    (4)CV - likely longstanding hypertension      RECOMMEND:  (1)Stop IVF D5W@60cc/h  (2)Continue Norvasc 5mg po qd  (3)Encourage oral intake  (4)Follow up SPEP/immunofixation   (5)BMP daily   (6)Dose new meds for GFR 35-40ml/min    Shamika Forbes NP  [facility]  Office: [phone]      RENAL ATTENDING NOTE  Patient seen and examined with NP. Agree with assessment and plan as above.    Spike Quach MD  [facility]  [phone]

## 2023-05-15 NOTE — BH CONSULTATION LIAISON PROGRESS NOTE - NSBHCONSULTRECOMMENDOTHER_PSY_A_CORE FT
Continue with Abilify 5mg/day as she is less paranoid on this med  Continue with Ativan 0.75mg p.o. qhs  Can d/c CIWA monitoring  Consider neurology consultation for dementia evaluation

## 2023-05-15 NOTE — PROGRESS NOTE ADULT - SUBJECTIVE AND OBJECTIVE BOX
Chief complaint  Patient is a 72y old  Female who presents with a chief complaint of FTT (14 May 2023 17:22)         Labs and Fingersticks  CAPILLARY BLOOD GLUCOSE          Anion Gap, Serum: 13 (05-15 @ 07:02)  Anion Gap, Serum: 11 (05-14 @ 07:00)      Calcium, Total Serum: 9.4 (05-15 @ 07:02)  Calcium, Total Serum: 9.5 (05-14 @ 07:00)  Albumin, Serum: 3.0 *L* (05-15 @ 07:02)    Alanine Aminotransferase (ALT/SGPT): 36 (05-15 @ 07:02)  Alkaline Phosphatase, Serum: 91 (05-15 @ 07:02)  Aspartate Aminotransferase (AST/SGOT): 41 *H* (05-15 @ 07:02)        05-15    140  |  103  |  28<H>  ----------------------------<  106<H>  3.8   |  24  |  1.32<H>    Ca    9.4      15 May 2023 07:02  Mg     2.4     05-14    TPro  5.9<L>  /  Alb  3.0<L>  /  TBili  0.6  /  DBili  0.2  /  AST  41<H>  /  ALT  36  /  AlkPhos  91  05-15                        16.0   7.12  )-----------( 172      ( 14 May 2023 07:00 )             47.2     Medications  MEDICATIONS  (STANDING):  amLODIPine   Tablet 5 milliGRAM(s) Oral daily  ARIPiprazole 5 milliGRAM(s) Oral daily  ascorbic acid 500 milliGRAM(s) Oral daily  cefuroxime   Tablet 250 milliGRAM(s) Oral every 12 hours  chlorhexidine 2% Cloths 1 Application(s) Topical daily  enoxaparin Injectable 30 milliGRAM(s) SubCutaneous every 24 hours  levothyroxine 100 MICROGram(s) Oral daily  LORazepam     Tablet 0.75 milliGRAM(s) Oral at bedtime  multivitamin 1 Tablet(s) Oral daily  senna 2 Tablet(s) Oral at bedtime      Physical Exam  General: Patient comfortable in bed   Vital Signs Last 12 Hrs  T(F): 97.1 (05-15-23 @ 09:37), Max: 99.1 (05-15-23 @ 04:40)  HR: 112 (05-15-23 @ 09:37) (100 - 112)  BP: 117/72 (05-15-23 @ 09:37) (117/72 - 120/85)  BP(mean): --  RR: 18 (05-15-23 @ 09:37) (18 - 18)  SpO2: 98% (05-15-23 @ 09:37) (97% - 98%)    CVS: S1S2   Respiratory: No wheezing, no crepitations  GI: Abdomen soft, bowel sounds positive  Musculoskeletal:  moves all extremities  : Voiding        Chief complaint  Patient is a 72y old  Female who presents with a chief complaint of FTT (14 May 2023 17:22)     Labs and Fingersticks  CAPILLARY BLOOD GLUCOSE          Anion Gap, Serum: 13 (05-15 @ 07:02)  Anion Gap, Serum: 11 (05-14 @ 07:00)      Calcium, Total Serum: 9.4 (05-15 @ 07:02)  Calcium, Total Serum: 9.5 (05-14 @ 07:00)  Albumin, Serum: 3.0 *L* (05-15 @ 07:02)    Alanine Aminotransferase (ALT/SGPT): 36 (05-15 @ 07:02)  Alkaline Phosphatase, Serum: 91 (05-15 @ 07:02)  Aspartate Aminotransferase (AST/SGOT): 41 *H* (05-15 @ 07:02)        05-15    140  |  103  |  28<H>  ----------------------------<  106<H>  3.8   |  24  |  1.32<H>    Ca    9.4      15 May 2023 07:02  Mg     2.4     05-14    TPro  5.9<L>  /  Alb  3.0<L>  /  TBili  0.6  /  DBili  0.2  /  AST  41<H>  /  ALT  36  /  AlkPhos  91  05-15                        16.0   7.12  )-----------( 172      ( 14 May 2023 07:00 )             47.2     Medications  MEDICATIONS  (STANDING):  amLODIPine   Tablet 5 milliGRAM(s) Oral daily  ARIPiprazole 5 milliGRAM(s) Oral daily  ascorbic acid 500 milliGRAM(s) Oral daily  cefuroxime   Tablet 250 milliGRAM(s) Oral every 12 hours  chlorhexidine 2% Cloths 1 Application(s) Topical daily  enoxaparin Injectable 30 milliGRAM(s) SubCutaneous every 24 hours  levothyroxine 100 MICROGram(s) Oral daily  LORazepam     Tablet 0.75 milliGRAM(s) Oral at bedtime  multivitamin 1 Tablet(s) Oral daily  senna 2 Tablet(s) Oral at bedtime      Physical Exam  General: Patient comfortable in bed   Vital Signs Last 12 Hrs  T(F): 97.1 (05-15-23 @ 09:37), Max: 99.1 (05-15-23 @ 04:40)  HR: 112 (05-15-23 @ 09:37) (100 - 112)  BP: 117/72 (05-15-23 @ 09:37) (117/72 - 120/85)  BP(mean): --  RR: 18 (05-15-23 @ 09:37) (18 - 18)  SpO2: 98% (05-15-23 @ 09:37) (97% - 98%)    CVS: S1S2   Respiratory: No wheezing, no crepitations  GI: Abdomen soft, bowel sounds positive  Musculoskeletal:  moves all extremities  : Voiding

## 2023-05-15 NOTE — BH CONSULTATION LIAISON PROGRESS NOTE - NSBHASSESSMENTFT_PSY_ALL_CORE
Delirium (Covid, hyperthyroid)  Bipolar disorder  Rule out dementia given her memory impairment, sundowning, and word finding difficulty

## 2023-05-15 NOTE — PROGRESS NOTE ADULT - SUBJECTIVE AND OBJECTIVE BOX
Date of service: 05-15-23 @ 22:27      Patient is a 72y old  Female who presents with a chief complaint of FTT (14 May 2023 17:22)                                                               INTERVAL HPI/OVERNIGHT EVENTS:    REVIEW OF SYSTEMS:     CONSTITUTIONAL: No weakness, fevers or chills  EYES/ENT: No visual changes , no ear ache   NECK: No pain or stiffness  RESPIRATORY: No cough, wheezing,  No shortness of breath  CARDIOVASCULAR: No chest pain or palpitations  GASTROINTESTINAL: No abdominal pain  . No nausea, vomiting, or hematemesis; No diarrhea or constipation. No melena or hematochezia.  GENITOURINARY: No dysuria, frequency or hematuria  NEUROLOGICAL: No numbness or weakness  SKIN: No itching, burning, rashes, or lesions                                                                                                                                                                                                                                                                                 Medications:  MEDICATIONS  (STANDING):  amLODIPine   Tablet 5 milliGRAM(s) Oral daily  ARIPiprazole 5 milliGRAM(s) Oral daily  ascorbic acid 500 milliGRAM(s) Oral daily  cefuroxime   Tablet 250 milliGRAM(s) Oral every 12 hours  chlorhexidine 2% Cloths 1 Application(s) Topical daily  enoxaparin Injectable 30 milliGRAM(s) SubCutaneous every 24 hours  levothyroxine 100 MICROGram(s) Oral daily  LORazepam     Tablet 0.75 milliGRAM(s) Oral at bedtime  multivitamin 1 Tablet(s) Oral daily  senna 2 Tablet(s) Oral at bedtime    MEDICATIONS  (PRN):  LORazepam     Tablet 0.5 milliGRAM(s) Oral every 4 hours PRN CIWA-Ar score 8 or greater       Allergies    No Known Allergies    Intolerances      Vital Signs Last 24 Hrs  T(C): 36.7 (15 May 2023 20:15), Max: 37.3 (15 May 2023 04:40)  T(F): 98.1 (15 May 2023 20:15), Max: 99.1 (15 May 2023 04:40)  HR: 102 (15 May 2023 20:15) (100 - 112)  BP: 123/80 (15 May 2023 20:15) (117/72 - 135/86)  BP(mean): --  RR: 18 (15 May 2023 20:15) (18 - 18)  SpO2: 98% (15 May 2023 20:15) (97% - 98%)    Parameters below as of 15 May 2023 20:15  Patient On (Oxygen Delivery Method): room air      CAPILLARY BLOOD GLUCOSE          05-14 @ 07:01  -  05-15 @ 07:00  --------------------------------------------------------  IN: 1420 mL / OUT: 3075 mL / NET: -1655 mL    05-15 @ 07:01  -  05-15 @ 22:27  --------------------------------------------------------  IN: 240 mL / OUT: 750 mL / NET: -510 mL      Physical Exam:    Daily     Daily   General:  NAD  HEENT:  Nonicteric, PERRLA  CV:  RRR, S1S2   Lungs:  CTA B/L, no wheezes, rales, rhonchi  Abdomen:  Soft, non-tender, no distended, positive BS  Extremities:  2+ pulses, no c/c, no edema  Skin:  Warm and dry, no rashes  :  No de guzman  Neuro:  AAOx3, non-focal, grossly intact                                                                                                                                                                                                                                                                                                LABS:                               16.0   7.12  )-----------( 172      ( 14 May 2023 07:00 )             47.2                      05-15    140  |  103  |  28<H>  ----------------------------<  106<H>  3.8   |  24  |  1.32<H>    Ca    9.4      15 May 2023 07:02  Mg     2.4     05-14    TPro  5.9<L>  /  Alb  3.0<L>  /  TBili  0.6  /  DBili  0.2  /  AST  41<H>  /  ALT  36  /  AlkPhos  91  05-15                       RADIOLOGY & ADDITIONAL TESTS         I personally reviewed: [  ]EKG   [  ]CXR    [  ] CT      A/P:         Discussed with :     Becky consultants' Notes   Time spent :

## 2023-05-15 NOTE — PROGRESS NOTE ADULT - ASSESSMENT
Assessment  72y female with history of thyroid cancer, bipolar d/o, lives alone and by report has been very withdrawn, very poor  po intake and has recent weight loss, no overnight events.  Hypothyroidism: On Synthroid 112 mcg po daily, TSH 0.06, FT4 2.2. Now Hyperthyroid. Thyroxine adjusted, Hx of Thyroidectomy due to thyroid cancer.  Thyroglobulin/AB negative.   COVID+: supportive care, on remdesivir. On isolation   Hypercalcemia: mild, dehydration  resolved s/p IVF hydration.         Discussed plan and management with Dr Blanca Sharp NP - TEAMS  Sloan Rios MD  Cell: [phone]  Office: [phone]                Assessment  72y female with history of thyroid cancer, bipolar d/o, lives alone and by report has been very withdrawn, very poor  po intake and has recent weight loss, no  overnight events.  Hypothyroidism: On Synthroid 112 mcg po daily, TSH 0.06, FT4 2.2. Now Hyperthyroid. Thyroxine adjusted, Hx of Thyroidectomy due to thyroid cancer.  Thyroglobulin/AB negative.   COVID+: supportive care, on remdesivir. On isolation   Hypercalcemia: mild, dehydration  resolved s/p IVF hydration.         Discussed plan and management with Dr Blanca Sharp NP - TEAMS  Sloan Rios MD  Cell: [phone]  Office: [phone]

## 2023-05-16 LAB
ALBUMIN SERPL ELPH-MCNC: 3.3 G/DL — SIGNIFICANT CHANGE UP (ref 3.3–5)
ALP SERPL-CCNC: 98 U/L — SIGNIFICANT CHANGE UP (ref 40–120)
ALT FLD-CCNC: 59 U/L — HIGH (ref 10–45)
ANION GAP SERPL CALC-SCNC: 10 MMOL/L — SIGNIFICANT CHANGE UP (ref 5–17)
AST SERPL-CCNC: 60 U/L — HIGH (ref 10–40)
BILIRUB DIRECT SERPL-MCNC: 0.1 MG/DL — SIGNIFICANT CHANGE UP (ref 0–0.3)
BILIRUB INDIRECT FLD-MCNC: 0.3 MG/DL — SIGNIFICANT CHANGE UP (ref 0.2–1)
BILIRUB SERPL-MCNC: 0.4 MG/DL — SIGNIFICANT CHANGE UP (ref 0.2–1.2)
BUN SERPL-MCNC: 33 MG/DL — HIGH (ref 7–23)
CALCIUM SERPL-MCNC: 9.8 MG/DL — SIGNIFICANT CHANGE UP (ref 8.4–10.5)
CHLORIDE SERPL-SCNC: 104 MMOL/L — SIGNIFICANT CHANGE UP (ref 96–108)
CO2 SERPL-SCNC: 27 MMOL/L — SIGNIFICANT CHANGE UP (ref 22–31)
CREAT SERPL-MCNC: 1.47 MG/DL — HIGH (ref 0.5–1.3)
CREAT SERPL-MCNC: 1.49 MG/DL — HIGH (ref 0.5–1.3)
EGFR: 37 ML/MIN/1.73M2 — LOW
EGFR: 37 ML/MIN/1.73M2 — LOW
GLUCOSE SERPL-MCNC: 104 MG/DL — HIGH (ref 70–99)
INR BLD: 1.01 RATIO — SIGNIFICANT CHANGE UP (ref 0.88–1.16)
POTASSIUM SERPL-MCNC: 3.9 MMOL/L — SIGNIFICANT CHANGE UP (ref 3.5–5.3)
POTASSIUM SERPL-SCNC: 3.9 MMOL/L — SIGNIFICANT CHANGE UP (ref 3.5–5.3)
PROT SERPL-MCNC: 6.4 G/DL — SIGNIFICANT CHANGE UP (ref 6–8.3)
PROTHROM AB SERPL-ACNC: 11.6 SEC — SIGNIFICANT CHANGE UP (ref 10.5–13.4)
SODIUM SERPL-SCNC: 141 MMOL/L — SIGNIFICANT CHANGE UP (ref 135–145)

## 2023-05-16 RX ADMIN — ENOXAPARIN SODIUM 30 MILLIGRAM(S): 100 INJECTION SUBCUTANEOUS at 13:19

## 2023-05-16 RX ADMIN — Medication 500 MILLIGRAM(S): at 13:18

## 2023-05-16 RX ADMIN — Medication 100 MICROGRAM(S): at 05:48

## 2023-05-16 RX ADMIN — SENNA PLUS 2 TABLET(S): 8.6 TABLET ORAL at 21:17

## 2023-05-16 RX ADMIN — Medication 250 MILLIGRAM(S): at 05:48

## 2023-05-16 RX ADMIN — CHLORHEXIDINE GLUCONATE 1 APPLICATION(S): 213 SOLUTION TOPICAL at 13:16

## 2023-05-16 RX ADMIN — Medication 1 TABLET(S): at 13:19

## 2023-05-16 RX ADMIN — Medication 250 MILLIGRAM(S): at 17:34

## 2023-05-16 RX ADMIN — AMLODIPINE BESYLATE 5 MILLIGRAM(S): 2.5 TABLET ORAL at 05:48

## 2023-05-16 RX ADMIN — Medication 0.75 MILLIGRAM(S): at 21:17

## 2023-05-16 RX ADMIN — ARIPIPRAZOLE 5 MILLIGRAM(S): 15 TABLET ORAL at 13:18

## 2023-05-16 NOTE — DISCHARGE NOTE PROVIDER - PROVIDER TOKENS
PROVIDER:[TOKEN:[[id number] PROVIDER:[TOKEN:[[id number]]],PROVIDER:[TOKEN:[[id number]]]

## 2023-05-16 NOTE — DISCHARGE NOTE PROVIDER - NSDCFUADDINST_GEN_ALL_CORE_FT
Please obtain repeat thyroid labs within 4-6 weeks outpt  Please obtain repeat thyroid labs within 4-6 weeks outpt     Please have psych reassessment in 5-7 days at [facility]

## 2023-05-16 NOTE — BH CONSULTATION LIAISON PROGRESS NOTE - NSBHCONSULTRECOMMENDOTHER_PSY_A_CORE FT
Abilify 5mg qd  Ativan 0.75mg qhs  Endocrine and renal consult follow up  No longer needs CIWA monitoring as she has been here for over one week

## 2023-05-16 NOTE — PROGRESS NOTE ADULT - SUBJECTIVE AND OBJECTIVE BOX
Spoke with patient re: new referral.  He was originally prescribed warfarin for a blood clot in his lung in 2010 with a goal range of 2.0-3.0. In July 2019 he switched to Eliquis 5mg q12h. He started to have adverse side effects (itching, rash) and has decided to switch back to warfarin. Dr. Tom has called him in a prescription for warfarin which he will start tomorrow (9/7). Per Dr. Tom, he will resume his prior maintenance dose of warfarin 10mg daily except 15mg on ThursFri.    He is set to come to the clinic on 9/11 at 2pm.         Chief complaint  Patient is a 72y old  Female who presents with a chief complaint of FTT (14 May 2023 17:22)         Labs and Fingersticks  CAPILLARY BLOOD GLUCOSE          Anion Gap, Serum: 10 (05-16 @ 07:11)  Anion Gap, Serum: 13 (05-15 @ 07:02)      Calcium, Total Serum: 9.8 (05-16 @ 07:11)  Calcium, Total Serum: 9.4 (05-15 @ 07:02)  Albumin, Serum: 3.3 (05-16 @ 07:11)  Albumin, Serum: 3.0 *L* (05-15 @ 07:02)    Alanine Aminotransferase (ALT/SGPT): 59 *H* (05-16 @ 07:11)  Alanine Aminotransferase (ALT/SGPT): 36 (05-15 @ 07:02)  Alkaline Phosphatase, Serum: 98 (05-16 @ 07:11)  Alkaline Phosphatase, Serum: 91 (05-15 @ 07:02)  Aspartate Aminotransferase (AST/SGOT): 60 *H* (05-16 @ 07:11)  Aspartate Aminotransferase (AST/SGOT): 41 *H* (05-15 @ 07:02)        05-16    141  |  104  |  33<H>  ----------------------------<  104<H>  3.9   |  27  |  1.47<H>    Ca    9.8      16 May 2023 07:11    TPro  6.4  /  Alb  3.3  /  TBili  0.4  /  DBili  0.1  /  AST  60<H>  /  ALT  59<H>  /  AlkPhos  98  05-16    Medications  MEDICATIONS  (STANDING):  amLODIPine   Tablet 5 milliGRAM(s) Oral daily  ARIPiprazole 5 milliGRAM(s) Oral daily  ascorbic acid 500 milliGRAM(s) Oral daily  cefuroxime   Tablet 250 milliGRAM(s) Oral every 12 hours  chlorhexidine 2% Cloths 1 Application(s) Topical daily  enoxaparin Injectable 30 milliGRAM(s) SubCutaneous every 24 hours  levothyroxine 100 MICROGram(s) Oral daily  LORazepam     Tablet 0.75 milliGRAM(s) Oral at bedtime  multivitamin 1 Tablet(s) Oral daily  senna 2 Tablet(s) Oral at bedtime      Physical Exam  General: Patient comfortable in bed   Vital Signs Last 12 Hrs  T(F): 97.6 (05-16-23 @ 13:12), Max: 98.1 (05-16-23 @ 05:45)  HR: 111 (05-16-23 @ 13:12) (99 - 111)  BP: 100/73 (05-16-23 @ 13:12) (100/73 - 130/50)  BP(mean): --  RR: 18 (05-16-23 @ 13:12) (18 - 18)  SpO2: 97% (05-16-23 @ 13:12) (97% - 100%)    CVS: S1S2   Respiratory: No wheezing, no crepitations  GI: Abdomen soft, bowel sounds positive  Musculoskeletal:  moves all extremities  : Voiding       Chief complaint  Patient is a 72y old  Female who presents with a chief complaint of FTT (14 May 2023 17:22)         Labs and Fingersticks  CAPILLARY BLOOD GLUCOSE          Anion Gap, Serum: 10 (05-16 @ 07:11)  Anion Gap, Serum: 13 (05-15 @ 07:02)      Calcium, Total Serum: 9.8 (05-16 @ 07:11)  Calcium, Total Serum: 9.4 (05-15 @ 07:02)  Albumin, Serum: 3.3 (05-16 @ 07:11)  Albumin, Serum: 3.0 *L* (05-15 @ 07:02)    Alanine Aminotransferase (ALT/SGPT): 59 *H* (05-16 @ 07:11)  Alanine Aminotransferase (ALT/SGPT): 36 (05-15 @ 07:02)  Alkaline Phosphatase, Serum: 98 (05-16 @ 07:11)  Alkaline Phosphatase, Serum: 91 (05-15 @ 07:02)  Aspartate Aminotransferase (AST/SGOT): 60 *H* (05-16 @ 07:11)  Aspartate Aminotransferase (AST/SGOT): 41 *H* (05-15 @ 07:02)        05-16    141  |  104  |  33<H>  ----------------------------<  104<H>  3.9   |  27  |  1.47<H>    Ca    9.8      16 May 2023 07:11    TPro  6.4  /  Alb  3.3  /  TBili  0.4  /  DBili  0.1  /  AST  60<H>  /  ALT  59<H>  /  AlkPhos  98  05-16    Medications  MEDICATIONS  (STANDING):  amLODIPine   Tablet 5 milliGRAM(s) Oral daily  ARIPiprazole 5 milliGRAM(s) Oral daily  ascorbic acid 500 milliGRAM(s) Oral daily  cefuroxime   Tablet 250 milliGRAM(s) Oral every 12 hours  chlorhexidine 2% Cloths 1 Application(s) Topical daily  enoxaparin Injectable 30 milliGRAM(s) SubCutaneous every 24 hours  levothyroxine 100 MICROGram(s) Oral daily  LORazepam     Tablet 0.75 milliGRAM(s) Oral at bedtime  multivitamin 1 Tablet(s) Oral daily  senna 2 Tablet(s) Oral at bedtime      Physical Exam  General: Patient comfortable in bed   Vital Signs Last 12 Hrs  T(F): 97.6 (05-16-23 @ 13:12), Max: 98.1 (05-16-23 @ 05:45)  HR: 111 (05-16-23 @ 13:12) (99 - 111)  BP: 100/73 (05-16-23 @ 13:12) (100/73 - 130/50)  BP(mean): --  RR: 18 (05-16-23 @ 13:12) (18 - 18)  SpO2: 97% (05-16-23 @ 13:12) (97% - 100%)    CVS: S1S2   Respiratory: No wheezing, no crepitations  GI: Abdomen soft, bowel sounds positive  Musculoskeletal:  moves all extremities  : Voiding

## 2023-05-16 NOTE — DISCHARGE NOTE PROVIDER - HOSPITAL COURSE
72y female with history of thyroid cancer, bipolar d/o, lives alone and by report has been very withdrawn, very poor  po intake and has recent weight loss, admitted with FTT.   Found to have covid positive, UTI and hypernatremia. Received 3 days of remdesivir. CT scan with R pyelo, started on Ceftriaxone, then switched to Ceftin. Received IFV for hyponatremia, now back to normal, from 152. Hospital course complicated by urinary retention, with JAYLA,placed indwelling urinary catheter, on 5/10, failed TOV, replaced catheter on 5/16. Creatinine is stable now. US kidney with out hydro. Noted TSH level of 0.06, and FT4 2.2. Seen by endocrine. Was on synthroid 112 mcg, now thyroxine adjusted. To repeat labs in 4-6 weeks. Thyroglobulin/AB negative. Patient cleared for discharge 72y female with history of thyroid cancer, bipolar d/o, lives alone and by report has been very withdrawn, very poor  po intake and has recent weight loss, admitted with FTT.   Found to have covid positive, UTI and hypernatremia. Received 3 days of remdesivir. CT scan with R pyelo, started on Ceftriaxone, then switched to Ceftin. Received IFV for hyponatremia, now back to normal, from 152. Hospital course complicated by urinary retention, with JAYLA,placed indwelling urinary catheter, on 5/10, failed TOV, replaced catheter on 5/16. Creatinine is stable now. US kidney with out hydro. Noted TSH level of 0.06, and FT4 2.2. Seen by endocrine. Was on synthroid 112 mcg, now thyroxine adjusted. To repeat labs in 4-6 weeks. Thyroglobulin/AB negative. Patient medically cleared for discharge by Dr. Espinosa. 72-year-old female with history of bipolar disorder and dementia, hypothyroidism status post thyroidectomy (had thyroid cancer)   comes into the ED via EMS for failure to thrive. Found incoherent, confused, with 20 lb weight loss 2/2 poor PO intake.     FTT with hypernatremia  -On arrival with poor PO intake and reported 20 lb weight loss  -Status post IV Fluid D5W    COVID-19  -Covid-19 PCR positive   -Status post course of RDV  -On room air  -no indication for decadron    Urinary retention  -Seen with abdominal distention and no urine output  -Failed TOV, de guzman placed    UTI  -Urinalysis postiive  -CT abd with right sided pyelo  -Completed course of Ceftin    Hyperthyroid  -Now seen with TSH .06 and FT4 2.2  -Synthroid dose reduced to 100 mcg daily    Hx of bipolar  - has not been on meds and does not seem to be actively psychotic     JAYLA   -Likely multifactorial including urinary retention, de guzman in place   -Mildly improving     Tachycardia/elevated BP  -secondary to infection/dehydration  -Completed abx and IV fluids  -Decreased synthroid  -Follow up with endocrine outpatient     Breast Lesion  -Per report   -Follow up with outpt PCP Dr. Ritchie and mammogram nonurgently       Pt has been medically cleared for discharge to [facility] per Dr. Espinosa 72-year-old female with history of bipolar disorder and dementia, hypothyroidism status post thyroidectomy (had thyroid cancer)   comes into the ED via EMS for failure to thrive. Found incoherent, confused, with 20 lb weight loss 2/2 poor PO intake.       FTT with hypernatremia  -On arrival with poor PO intake and reported 20 lb weight loss  -Status post IV Fluid D5W    COVID-19  -Covid-19 PCR positive   -Status post course of RDV  -On room air  -no indication for decadron    Urinary retention  -Seen with abdominal distention and no urine output  -Failed TOV, de guzman placed    UTI  -Urinalysis postiive  -CT abd with right sided pyelo  -Completed course of Ceftin    Hyperthyroid  -Now seen with TSH .06 and FT4 2.2  -Synthroid dose reduced to 100 mcg daily    Hx of bipolar  - has not been on meds and does not seem to be actively psychotic     JAYLA   -Likely multifactorial including urinary retention, de guzman in place   -Mildly improving     Tachycardia/elevated BP  -secondary to infection/dehydration  -Completed abx and IV fluids  -Decreased synthroid  -Follow up with endocrine outpatient     Breast Lesion  -Per report   -Follow up with outpt PCP Dr. Ritchie and mammogram nonurgently     Pt has been medically cleared for discharge to [facility] per Dr. Espinosa

## 2023-05-16 NOTE — DISCHARGE NOTE PROVIDER - NSDCCPCAREPLAN_GEN_ALL_CORE_FT
PRINCIPAL DISCHARGE DIAGNOSIS  Diagnosis: FTT (failure to thrive) in adult  Assessment and Plan of Treatment: Admitted with poor po intake and weakness. Found to have multiple acute issues. Received IV hydration. Now improved      SECONDARY DISCHARGE DIAGNOSES  Diagnosis: 2019 novel coronavirus disease (COVID-19)  Assessment and Plan of Treatment: Received 3 days of remdesivir    Diagnosis: Hypothyroid  Assessment and Plan of Treatment: Your TSh was only 0.06, synthroid dose has been decreased. Repeat labs in 4-6 weeks. Follow up with your PCP or endocrine.    Diagnosis: Acute UTI  Assessment and Plan of Treatment: CT scan with R pyelo.  was on Ceftriaxone IV, now switched to PO ceftin.  HOME CARE INSTRUCTIONS  f you were prescribed antibiotics, take them exactly as your caregiver instructs you. Finish the medication even if you feel better after you have only taken some of the medication.  Drink enough water and fluids to keep your urine clear or pale yellow.  Avoid caffeine, tea, and carbonated beverages. They tend to irritate your bladder.  Empty your bladder often. Avoid holding urine for long periods of time.  SEEK MEDICAL CARE IF:  You have back pain.  You develop a fever.  Your symptoms do not begin to resolve within 3 days.  SEEK IMMEDIATE MEDICAL CARE IF:  You have severe back pain or lower abdominal pain.  You develop chills.  You have nausea or vomiting.  You have continued burning or discomfort with urination.      Diagnosis: Acute urinary retention  Assessment and Plan of Treatment: Placed de guzman on 5/10, removed on 5/15. Replaced on 5/16 due to failed TOV. Follow up with urology. Avoid constipation.    Diagnosis: Hypernatremia  Assessment and Plan of Treatment: Likely due to poor po intake. S/P IVF. Now normal    Diagnosis: Bipolar disorder  Assessment and Plan of Treatment: Follow up with your psychiatrist or you can also follow up at [facility]. Continue with current medications.

## 2023-05-16 NOTE — DISCHARGE NOTE PROVIDER - NSDCMRMEDTOKEN_GEN_ALL_CORE_FT
Synthroid 112 mcg (0.112 mg) oral tablet: 1 tab(s) orally once a day   amLODIPine 5 mg oral tablet: 1 tab(s) orally once a day  ARIPiprazole 5 mg oral tablet: 1 tab(s) orally once a day  ascorbic acid 500 mg oral tablet: 1 tab(s) orally once a day  levothyroxine 100 mcg (0.1 mg) oral tablet: 1 tab(s) orally once a day  LORazepam: 0.75 milligram(s) orally once a day (at bedtime)  Multiple Vitamins oral tablet: 1 tab(s) orally once a day  senna leaf extract oral tablet: 2 tab(s) orally once a day (at bedtime)   amLODIPine 5 mg oral tablet: 1 tab(s) orally once a day  ARIPiprazole 5 mg oral tablet: 1 tab(s) orally once a day  ascorbic acid 500 mg oral tablet: 1 tab(s) orally once a day  levothyroxine 100 mcg (0.1 mg) oral tablet: 1 tab(s) orally once a day  LORazepam: 0.75 milligram(s) orally once a day (at bedtime)  mirtazapine 7.5 mg oral tablet: 1 tab(s) orally once a day (at bedtime)  Multiple Vitamins oral tablet: 1 tab(s) orally once a day  rivaroxaban 10 mg oral tablet: 1 tab(s) orally once a day  senna leaf extract oral tablet: 2 tab(s) orally once a day (at bedtime)   amLODIPine 5 mg oral tablet: 1 tab(s) orally once a day  ARIPiprazole 5 mg oral tablet: 1 tab(s) orally once a day  ascorbic acid 500 mg oral tablet: 1 tab(s) orally once a day  levothyroxine 88 mcg (0.088 mg) oral tablet: 1 tab(s) orally once a day  LORazepam: 0.75 milligram(s) orally once a day (at bedtime)  mirtazapine 15 mg oral tablet: 1 tab(s) orally once a day (at bedtime)  Multiple Vitamins oral tablet: 1 tab(s) orally once a day  rivaroxaban 10 mg oral tablet: 1 tab(s) orally once a day  senna leaf extract oral tablet: 2 tab(s) orally once a day (at bedtime)   amLODIPine 5 mg oral tablet: 1 tab(s) orally once a day  ARIPiprazole 5 mg oral tablet: 1 tab(s) orally once a day  ascorbic acid 500 mg oral tablet: 1 tab(s) orally once a day  levothyroxine 88 mcg (0.088 mg) oral tablet: 1 tab(s) orally once a day  LORazepam: 0.75 milligram(s) orally once a day (at bedtime)  mirtazapine 15 mg oral tablet: 1 tab(s) orally once a day (at bedtime)  Multiple Vitamins oral tablet: 1 tab(s) orally once a day  rivaroxaban 10 mg oral tablet: 1 tab(s) orally once a day for additional 14 days from 5/22 to 6/5 for Covid ppx  senna leaf extract oral tablet: 2 tab(s) orally once a day (at bedtime)   amLODIPine 5 mg oral tablet: 1 tab(s) orally once a day  ARIPiprazole 5 mg oral tablet: 1 tab(s) orally once a day  ascorbic acid 500 mg oral tablet: 1 tab(s) orally once a day  levothyroxine 88 mcg (0.088 mg) oral tablet: 1 tab(s) orally once a day  LORazepam: 0.5 milligram(s) orally once a day (at bedtime) for 5 nights and then have psych reassessment  mirtazapine 7.5 mg oral tablet: 1 tab(s) orally once a day (at bedtime)  Multiple Vitamins oral tablet: 1 tab(s) orally once a day  rivaroxaban 10 mg oral tablet: 1 tab(s) orally once a day for additional 14 days from 5/22 to 6/5 for Covid ppx  senna leaf extract oral tablet: 2 tab(s) orally once a day (at bedtime)

## 2023-05-16 NOTE — DISCHARGE NOTE PROVIDER - NSDCFUADDAPPT_GEN_ALL_CORE_FT
APPTS ARE READY TO BE MADE: [x] YES    Best Family or Patient Contact (if needed):    Additional Information about above appointments (if needed):    1:   2:   3:     Other comments or requests:    APPTS ARE READY TO BE MADE: [x] YES    Best Family or Patient Contact (if needed):    Additional Information about above appointments (if needed):    1: Follow up with PCP Dr. Ritchie outpatient within 1-2 weeks   2: Follow up with endocrine Dr. Rios outpatient within 4-6 weeks  3: Follow up with nephrologist Dr. Vikas Quach on as needed basis   4: Psychiatric follow up at Outpatient at [facility] ([phone])

## 2023-05-16 NOTE — PROGRESS NOTE ADULT - SUBJECTIVE AND OBJECTIVE BOX
NEPHROLOGY     Patient seen and examined sitting in chair, reports feeling ok, noted failed TOV, de guzman reinserted this morning, denies pain, no sob, in no acute distress.     MEDICATIONS  (STANDING):  amLODIPine   Tablet 5 milliGRAM(s) Oral daily  ARIPiprazole 5 milliGRAM(s) Oral daily  ascorbic acid 500 milliGRAM(s) Oral daily  cefuroxime   Tablet 250 milliGRAM(s) Oral every 12 hours  chlorhexidine 2% Cloths 1 Application(s) Topical daily  enoxaparin Injectable 30 milliGRAM(s) SubCutaneous every 24 hours  levothyroxine 100 MICROGram(s) Oral daily  LORazepam     Tablet 0.75 milliGRAM(s) Oral at bedtime  multivitamin 1 Tablet(s) Oral daily  senna 2 Tablet(s) Oral at bedtime    VITALS:  T(C): , Max: 36.7 (05-15-23 @ 16:16)  T(F): , Max: 98.1 (05-15-23 @ 20:15)  HR: 111 (05-16-23 @ 13:12)  BP: 100/73 (05-16-23 @ 13:12)  RR: 18 (05-16-23 @ 13:12)  SpO2: 97% (05-16-23 @ 13:12)    I and O's:    05-15 @ 07:01  -  05-16 @ 07:00  --------------------------------------------------------  IN: 240 mL / OUT: 1150 mL / NET: -910 mL    05-16 @ 07:01  -  05-16 @ 14:56  --------------------------------------------------------  IN: 490 mL / OUT: 1250 mL / NET: -760 mL    PHYSICAL EXAM:    Constitutional: NAD  Neck:  No JVD  Respiratory: CTAB/L  Cardiovascular: tachy S1 and S2  Gastrointestinal: BS+, soft, NT/ND  Extremities: No peripheral edema  Neurological: confused   Psychiatric: Normal mood, normal affect  : + Tere  Skin: No rashes    LABS:    05-16    141  |  104  |  33<H>  ----------------------------<  104<H>  3.9   |  27  |  1.47<H>    Ca    9.8      16 May 2023 07:11    TPro  6.4  /  Alb  3.3  /  TBili  0.4  /  DBili  0.1  /  AST  60<H>  /  ALT  59<H>  /  AlkPhos  98  05-16

## 2023-05-16 NOTE — DISCHARGE NOTE PROVIDER - DETAILS OF MALNUTRITION DIAGNOSIS/DIAGNOSES
This patient has been assessed with a concern for Malnutrition and was treated during this hospitalization for the following Nutrition diagnosis/diagnoses:     -  05/11/2023: Severe protein-calorie malnutrition   -  05/11/2023: Underweight (BMI < 19)

## 2023-05-16 NOTE — PROGRESS NOTE ADULT - SUBJECTIVE AND OBJECTIVE BOX
Date of service: 05-16-23 @ 15:51      Patient is a 72y old  Female who presents with a chief complaint of FTT (14 May 2023 17:22)                                                               INTERVAL HPI/OVERNIGHT EVENTS:    REVIEW OF SYSTEMS:     CONSTITUTIONAL: No weakness, fevers or chills  RESPIRATORY: No cough, wheezing,  No shortness of breath  CARDIOVASCULAR: No chest pain or palpitations  GASTROINTESTINAL: No abdominal pain  . No nausea, vomiting, or hematemesis; No diarrhea or constipation. No melena or hematochezia.  GENITOURINARY: No dysuria, frequency or hematuria  NEUROLOGICAL: No numbness or weakness                                                                                                                                                                                                                                                                              Medications:  MEDICATIONS  (STANDING):  amLODIPine   Tablet 5 milliGRAM(s) Oral daily  ARIPiprazole 5 milliGRAM(s) Oral daily  ascorbic acid 500 milliGRAM(s) Oral daily  cefuroxime   Tablet 250 milliGRAM(s) Oral every 12 hours  chlorhexidine 2% Cloths 1 Application(s) Topical daily  enoxaparin Injectable 30 milliGRAM(s) SubCutaneous every 24 hours  levothyroxine 100 MICROGram(s) Oral daily  LORazepam     Tablet 0.75 milliGRAM(s) Oral at bedtime  multivitamin 1 Tablet(s) Oral daily  senna 2 Tablet(s) Oral at bedtime    MEDICATIONS  (PRN):  LORazepam     Tablet 0.5 milliGRAM(s) Oral every 4 hours PRN CIWA-Ar score 8 or greater       Allergies    No Known Allergies    Intolerances      Vital Signs Last 24 Hrs  T(C): 36.4 (16 May 2023 13:12), Max: 36.7 (15 May 2023 16:16)  T(F): 97.6 (16 May 2023 13:12), Max: 98.1 (15 May 2023 20:15)  HR: 111 (16 May 2023 13:12) (96 - 111)  BP: 100/73 (16 May 2023 13:12) (100/73 - 130/78)  BP(mean): --  RR: 18 (16 May 2023 13:12) (18 - 18)  SpO2: 97% (16 May 2023 13:12) (97% - 100%)    Parameters below as of 16 May 2023 13:12  Patient On (Oxygen Delivery Method): room air      CAPILLARY BLOOD GLUCOSE          05-15 @ 07:01  -  05-16 @ 07:00  --------------------------------------------------------  IN: 240 mL / OUT: 1150 mL / NET: -910 mL    05-16 @ 07:01  -  05-16 @ 15:51  --------------------------------------------------------  IN: 490 mL / OUT: 1250 mL / NET: -760 mL      Physical Exam:    Daily     Daily   General:  cachetic  HEENT:  Nonicteric, PERRLA  CV:  RRR, S1S2   Lungs:  CTA B/L, no wheezes, rales, rhonchi  Abdomen:  Soft, non-tender, no distended, positive BS  Extremities:  2+ pulses, no c/c, no edema  Skin:  Warm and dry, no rashes  :  No de guzman  Neuro:  AAOx3, non-focal, grossly intact                                                                                                                                                                                                                                                                                                LABS:                            05-16    141  |  104  |  33<H>  ----------------------------<  104<H>  3.9   |  27  |  1.47<H>    Ca    9.8      16 May 2023 07:11    TPro  6.4  /  Alb  3.3  /  TBili  0.4  /  DBili  0.1  /  AST  60<H>  /  ALT  59<H>  /  AlkPhos  98  05-16                       RADIOLOGY & ADDITIONAL TESTS         I personally reviewed: [  ]EKG   [  ]CXR    [  ] CT      A/P:         Discussed with :     Becky consultants' Notes   Time spent :

## 2023-05-16 NOTE — PROGRESS NOTE ADULT - ASSESSMENT
ASSESSMENT:  72 year-old woman with history only notable for thyroid cancer s/p resection, with resultant hypothyroidism. Presents withwith loss of appetite, associated 20lb weight loss, insomnia, and worsening confusion.     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 Low urine osm on admission despite hypernatremia. Does she have chronic diabetes insipidus? Possible that the hypercalcemia from admission led to the low urine SG at that time. Improving     (3)Hypercalcemia - mild - improved since admission s/p IVF    (4)CV - likely longstanding hypertension    RECOMMEND:  (1)No IVF  (2)Continue Norvasc 5mg po qd  (3)Encourage oral intake  (4)Follow up SPEP/immunofixation   (5)BMP daily   (6)Dose new meds for GFR 35-40ml/min    Ira Chen DNP  [facility]  [phone]

## 2023-05-16 NOTE — DISCHARGE NOTE PROVIDER - CARE PROVIDER_API CALL
Sloan Rios)  EndocrinologyMetabDiabetes; Internal Medicine  [address] [place] [address]  Phone: [phone]  Fax: [phone]  Follow Up Time:    Sloan Rios)  EndocrinologyMetabDiabetes; Internal Medicine  [address] [place] [address]  Phone: [phone]  Fax: [phone]  Follow Up Time:     Spike Quach)  Internal Medicine; Nephrology  [address] [address]  Phone: [phone]  Fax: [phone]  Follow Up Time:

## 2023-05-16 NOTE — BH CONSULTATION LIAISON PROGRESS NOTE - NSBHFUPINTERVALHXFT_PSY_A_CORE
Pt. depressed today that she does not know how to use the telephone and needs someone to help her brush her teeth. Has possible chronic diabetes insipidus per renal. RN says she often requests fluids.

## 2023-05-16 NOTE — PROGRESS NOTE ADULT - ASSESSMENT
72-year-old female with history of bipolar disorder and dementia ? hypothyroidism s/p thyroidectomy (had thyroid cancer)   comes into the ED via EMS for failure to thrive.  Spoke with her niece,  Mylene  at [phone], who lives in [place] and is the patient's healthcare proxy.   Pt is a  , lives by herself ... her neighbor would take her food shopping. belongs to [employer] who also help.   she is incoherent and " withdrawn" and confused...  Lost her " common sense"   was not eating or drinking and lost 20lbs   She used to live with her  that he passed away in September 2020.  She was initially depressed for a brief period of time but returned to her baseline and was active until a month ago.  Her niece reports she used to be on lithium for her bipolar but has not been treated for the past 8 years.  she also reports she used to be on valium for " extreme insomnia " which pt had stopped for " sometime...    Currently in the emergency department patient does not complain of any pain or discomfort.  She states she has a decreased appetite and does not have any pain, nausea, vomiting or inability to eat.  Patient is not able to provide much of the history and she keeps stating she feels out of it and is unable to concentrate on anything.    pt was found to have COVID positive RVP.. a month ago she had fever , vomiting    pt is not able to provide any information  partially because she is hard of hearing     - COVID  infection unclear time of exposure   due to recent changes in status : given remdesivir, completed  no indication for dexa, she remain stable on room air  cont to monitor   ID consult appreciated    abd distention; urinary retention   monitor and placed De Guzman per protocol   failed TOV     uti; complete ctx   However, CT abd with right sided pyelo?  ID f/u requested. Restarted Ceftrin PO given CT scan findings of pyelo.     hx of bipolar: has not been on meds and does not seem to be actively psychotic   psych  following    FTT with hypernatremia: ivf D5W  monitor Na    JAYLA : monitor  likely multifactorial including urinary retention, de guzman in place. mimproving   renal input     tachycardia/elevated BP : ? sec to infection /hyperthyroid /withdrwal /anxiety/ dehydration ?  cont abx   benzo per psych   decreased synthroid   FU with endo   c/w D5W for hypernatremia    breast lesion: per report ..pt refused exam . Chaperon  present at the time   should follow up with outpt PCP and mammogram nonurgently     hypernatremia : monitor   appreciate nephro input   possible underlying DI :  will check osm and dw nephro         DVT ppx: no PE on CTangio. switch back to Lovenox 30 mg qdaily ppx dose    d/w HCP GOC  will fu final decision but does not seem to want her to be resuscitated ..had not signed papers in past

## 2023-05-16 NOTE — PROGRESS NOTE ADULT - ASSESSMENT
Assessment  72y female with history of thyroid cancer, bipolar d/o, lives alone and by report has been very withdrawn, very poor  po intake and has recent weight loss, no acute events.   Hypothyroidism: On Synthroid 112 mcg po daily, TSH 0.06, FT4 2.2. Now Hyperthyroid. Thyroxine adjusted, Hx of Thyroidectomy due to thyroid cancer.  Thyroglobulin/AB negative.   COVID+: supportive care, on remdesivir. On isolation   Hypercalcemia: mild, dehydration  resolved s/p IVF hydration.         Discussed plan and management with Dr Blanca Sharp NP - TEAMS  Sloan Rios MD  Cell: [phone]  Office: [phone]                Assessment  72y female with history of thyroid cancer, bipolar d/o, lives alone and by report has been very withdrawn, very poor  po intake and  has recent weight loss, no acute events.   Hypothyroidism: On Synthroid 112 mcg po daily, TSH 0.06, FT4 2.2. Now Hyperthyroid. Thyroxine adjusted, Hx of Thyroidectomy due to thyroid cancer.  Thyroglobulin/AB negative.   COVID+: supportive care, on remdesivir. On isolation   Hypercalcemia: mild, dehydration  resolved s/p IVF hydration.         Discussed plan and management with Dr Blanca Sharp NP - TEAMS  Sloan Rios MD  Cell: [phone]  Office: [phone]

## 2023-05-17 LAB
% ALBUMIN: 49.5 % — SIGNIFICANT CHANGE UP
% ALPHA 1: 6.5 % — SIGNIFICANT CHANGE UP
% ALPHA 2: 12.8 % — SIGNIFICANT CHANGE UP
% BETA: 12 % — SIGNIFICANT CHANGE UP
% GAMMA: 19.2 % — SIGNIFICANT CHANGE UP
% M SPIKE: 4.1 % — SIGNIFICANT CHANGE UP
ALBUMIN SERPL ELPH-MCNC: 3.1 G/DL — LOW (ref 3.6–5.5)
ALBUMIN SERPL ELPH-MCNC: 3.5 G/DL — SIGNIFICANT CHANGE UP (ref 3.3–5)
ALBUMIN/GLOB SERPL ELPH: 1 RATIO — SIGNIFICANT CHANGE UP
ALP SERPL-CCNC: 106 U/L — SIGNIFICANT CHANGE UP (ref 40–120)
ALPHA1 GLOB SERPL ELPH-MCNC: 0.4 G/DL — SIGNIFICANT CHANGE UP (ref 0.1–0.4)
ALPHA2 GLOB SERPL ELPH-MCNC: 0.8 G/DL — SIGNIFICANT CHANGE UP (ref 0.5–1)
ALT FLD-CCNC: 48 U/L — HIGH (ref 10–45)
ANION GAP SERPL CALC-SCNC: 12 MMOL/L — SIGNIFICANT CHANGE UP (ref 5–17)
AST SERPL-CCNC: 38 U/L — SIGNIFICANT CHANGE UP (ref 10–40)
B-GLOBULIN SERPL ELPH-MCNC: 0.7 G/DL — SIGNIFICANT CHANGE UP (ref 0.5–1)
BILIRUB DIRECT SERPL-MCNC: 0.1 MG/DL — SIGNIFICANT CHANGE UP (ref 0–0.3)
BILIRUB INDIRECT FLD-MCNC: 0.3 MG/DL — SIGNIFICANT CHANGE UP (ref 0.2–1)
BILIRUB SERPL-MCNC: 0.4 MG/DL — SIGNIFICANT CHANGE UP (ref 0.2–1.2)
BUN SERPL-MCNC: 34 MG/DL — HIGH (ref 7–23)
CALCIUM SERPL-MCNC: 9.9 MG/DL — SIGNIFICANT CHANGE UP (ref 8.4–10.5)
CHLORIDE SERPL-SCNC: 105 MMOL/L — SIGNIFICANT CHANGE UP (ref 96–108)
CO2 SERPL-SCNC: 26 MMOL/L — SIGNIFICANT CHANGE UP (ref 22–31)
CREAT SERPL-MCNC: 1.46 MG/DL — HIGH (ref 0.5–1.3)
CREAT SERPL-MCNC: 1.47 MG/DL — HIGH (ref 0.5–1.3)
EGFR: 37 ML/MIN/1.73M2 — LOW
EGFR: 38 ML/MIN/1.73M2 — LOW
GAMMA GLOBULIN: 1.2 G/DL — SIGNIFICANT CHANGE UP (ref 0.6–1.6)
GLUCOSE SERPL-MCNC: 93 MG/DL — SIGNIFICANT CHANGE UP (ref 70–99)
INR BLD: 0.95 RATIO — SIGNIFICANT CHANGE UP (ref 0.88–1.16)
INTERPRETATION SERPL IFE-IMP: SIGNIFICANT CHANGE UP
M-SPIKE: 0.3 G/DL — HIGH (ref 0–0)
OSMOLALITY SERPL: 305 MOSMOL/KG — HIGH (ref 280–301)
OSMOLALITY UR: 217 MOS/KG — LOW (ref 300–900)
POTASSIUM SERPL-MCNC: 3.6 MMOL/L — SIGNIFICANT CHANGE UP (ref 3.5–5.3)
POTASSIUM SERPL-SCNC: 3.6 MMOL/L — SIGNIFICANT CHANGE UP (ref 3.5–5.3)
PROLACTIN SERPL-MCNC: 10 NG/ML — SIGNIFICANT CHANGE UP (ref 3.4–24.1)
PROT PATTERN SERPL ELPH-IMP: SIGNIFICANT CHANGE UP
PROT SERPL-MCNC: 6.7 G/DL — SIGNIFICANT CHANGE UP (ref 6–8.3)
PROTHROM AB SERPL-ACNC: 11 SEC — SIGNIFICANT CHANGE UP (ref 10.5–13.4)
SARS-COV-2 RNA SPEC QL NAA+PROBE: SIGNIFICANT CHANGE UP
SODIUM SERPL-SCNC: 143 MMOL/L — SIGNIFICANT CHANGE UP (ref 135–145)
T4 FREE SERPL-MCNC: 1.9 NG/DL — HIGH (ref 0.9–1.8)

## 2023-05-17 RX ADMIN — SENNA PLUS 2 TABLET(S): 8.6 TABLET ORAL at 21:32

## 2023-05-17 RX ADMIN — Medication 100 MICROGRAM(S): at 05:42

## 2023-05-17 RX ADMIN — Medication 500 MILLIGRAM(S): at 12:10

## 2023-05-17 RX ADMIN — Medication 250 MILLIGRAM(S): at 16:56

## 2023-05-17 RX ADMIN — ARIPIPRAZOLE 5 MILLIGRAM(S): 15 TABLET ORAL at 12:10

## 2023-05-17 RX ADMIN — Medication 1 TABLET(S): at 12:10

## 2023-05-17 RX ADMIN — CHLORHEXIDINE GLUCONATE 1 APPLICATION(S): 213 SOLUTION TOPICAL at 12:08

## 2023-05-17 RX ADMIN — Medication 250 MILLIGRAM(S): at 05:42

## 2023-05-17 RX ADMIN — Medication 0.75 MILLIGRAM(S): at 21:31

## 2023-05-17 RX ADMIN — AMLODIPINE BESYLATE 5 MILLIGRAM(S): 2.5 TABLET ORAL at 05:41

## 2023-05-17 RX ADMIN — ENOXAPARIN SODIUM 30 MILLIGRAM(S): 100 INJECTION SUBCUTANEOUS at 12:10

## 2023-05-17 NOTE — PROGRESS NOTE ADULT - SUBJECTIVE AND OBJECTIVE BOX
Overnight events noted      VITAL:  T(C): , Max: 37 (05-17-23 @ 01:51)  T(F): , Max: 98.6 (05-17-23 @ 01:51)  HR: 98 (05-17-23 @ 05:48)  BP: 114/76 (05-17-23 @ 05:48)  BP(mean): --  RR: 18 (05-17-23 @ 05:48)  SpO2: 99% (05-17-23 @ 05:48)  Wt(kg): --      PHYSICAL EXAM:  Constitutional: Frail, NAD  HEENT: DMM  Neck:  No JVD  Respiratory: CTAB/L  Cardiovascular: reg S1 and S2  Gastrointestinal: BS+, soft, NT/ND  Extremities: No peripheral edema  Neurological: reduced generalized strength  : + Carranza  Skin: No rashes    LABS:    Na(--)/K(--)/Cl(--)/HCO3(--)/BUN(--)/Cr(1.46)Glu(--)/Ca(--)/Mg(--)/PO4(--)    05-17 @ 07:26  Na(143)/K(3.6)/Cl(105)/HCO3(26)/BUN(34)/Cr(1.47)Glu(93)/Ca(9.9)/Mg(--)/PO4(--)    05-17 @ 07:20  Na(141)/K(3.9)/Cl(104)/HCO3(27)/BUN(33)/Cr(1.47)Glu(104)/Ca(9.8)/Mg(--)/PO4(--)    05-16 @ 07:11  Na(140)/K(3.8)/Cl(103)/HCO3(24)/BUN(28)/Cr(1.32)Glu(106)/Ca(9.4)/Mg(--)/PO4(--)    05-15 @ 07:02      ASSESSMENT:  72 F w/ thyroid CA-resection, and resultant hypothyroidism 5/8/23 p/w loss of appetite, 20lb weight loss, and AMS     (1)Renal - CKD3 - likely from hypertensive nephrosclerosis - stable numbers over past few days    (2)Hypernatremia - in setting of poor free water intake/high insensible losses/renal impairment - now improved, s/p D5W. Stable, off D5W IV over past 1-2 days    (3)CV - acceptable BP/volume, off IVF and on Amlodipine    (4)UTI - advanced to PO abx      RECOMMEND:  (1)Meds as ordered/dose new meds for GFR 35-40ml/min  (2)No renal objection to discharge; can f/u at my office on a prn basis    Spike Quach MD  [facility]  Office/on call physician: [phone]  Cell (7a-7p): [phone]       no complaints      VITAL:  T(C): , Max: 37 (05-17-23 @ 01:51)  T(F): , Max: 98.6 (05-17-23 @ 01:51)  HR: 98 (05-17-23 @ 05:48)  BP: 114/76 (05-17-23 @ 05:48)  BP(mean): --  RR: 18 (05-17-23 @ 05:48)  SpO2: 99% (05-17-23 @ 05:48)  Wt(kg): --      PHYSICAL EXAM:  Constitutional: Frail, NAD  HEENT: DMM  Neck:  No JVD  Respiratory: CTAB/L  Cardiovascular: reg S1 and S2  Gastrointestinal: BS+, soft, NT/ND  Extremities: No peripheral edema  Neurological: reduced generalized strength  : + Carranza  Skin: No rashes    LABS:    Na(--)/K(--)/Cl(--)/HCO3(--)/BUN(--)/Cr(1.46)Glu(--)/Ca(--)/Mg(--)/PO4(--)    05-17 @ 07:26  Na(143)/K(3.6)/Cl(105)/HCO3(26)/BUN(34)/Cr(1.47)Glu(93)/Ca(9.9)/Mg(--)/PO4(--)    05-17 @ 07:20  Na(141)/K(3.9)/Cl(104)/HCO3(27)/BUN(33)/Cr(1.47)Glu(104)/Ca(9.8)/Mg(--)/PO4(--)    05-16 @ 07:11  Na(140)/K(3.8)/Cl(103)/HCO3(24)/BUN(28)/Cr(1.32)Glu(106)/Ca(9.4)/Mg(--)/PO4(--)    05-15 @ 07:02      ASSESSMENT:  72 F w/ thyroid CA-resection, and resultant hypothyroidism 5/8/23 p/w loss of appetite, 20lb weight loss, and AMS     (1)Renal - CKD3 - likely from hypertensive nephrosclerosis - stable numbers over past few days    (2)Hypernatremia - in setting of poor free water intake/high insensible losses/renal impairment - now improved, s/p D5W. Stable, off D5W IV over past 1-2 days    (3)CV - acceptable BP/volume, off IVF and on Amlodipine    (4)UTI - advanced to PO abx      RECOMMEND:  (1)Meds as ordered/dose new meds for GFR 35-40ml/min  (2)No renal objection to discharge; can f/u at my office on a prn basis    Spike Quach MD  [facility]  Office/on call physician: [phone]  Cell (7a-7p): [phone]       no complaints      VITAL:  T(C): , Max: 37 (05-17-23 @ 01:51)  T(F): , Max: 98.6 (05-17-23 @ 01:51)  HR: 98 (05-17-23 @ 05:48)  BP: 114/76 (05-17-23 @ 05:48)  BP(mean): --  RR: 18 (05-17-23 @ 05:48)  SpO2: 99% (05-17-23 @ 05:48)  Wt(kg): --      PHYSICAL EXAM:  Constitutional: Frail, NAD  HEENT: DMM  Neck:  No JVD  Respiratory: CTAB/L  Cardiovascular: reg S1 and S2  Gastrointestinal: BS+, soft, NT/ND  Extremities: No peripheral edema  Neurological: reduced generalized strength  : + Carranza  Skin: No rashes    LABS:    Na(--)/K(--)/Cl(--)/HCO3(--)/BUN(--)/Cr(1.46)Glu(--)/Ca(--)/Mg(--)/PO4(--)    05-17 @ 07:26  Na(143)/K(3.6)/Cl(105)/HCO3(26)/BUN(34)/Cr(1.47)Glu(93)/Ca(9.9)/Mg(--)/PO4(--)    05-17 @ 07:20  Na(141)/K(3.9)/Cl(104)/HCO3(27)/BUN(33)/Cr(1.47)Glu(104)/Ca(9.8)/Mg(--)/PO4(--)    05-16 @ 07:11  Na(140)/K(3.8)/Cl(103)/HCO3(24)/BUN(28)/Cr(1.32)Glu(106)/Ca(9.4)/Mg(--)/PO4(--)    05-15 @ 07:02      ASSESSMENT:  72 F w/ thyroid CA-resection, and resultant hypothyroidism 5/8/23 p/w loss of appetite, 20lb weight loss, and AMS     (1)Renal - CKD3 - likely from hypertensive nephrosclerosis - stable numbers over past few days    (2)Hypernatremia - in setting of poor free water intake/high insensible losses/renal impairment - now improved, s/p D5W. Stable, off D5W IV over past 1-2 days. Not from diabetes insipidus; no further DI workup needed at this point.    (3)CV - acceptable BP/volume, off IVF and on Amlodipine    (4)UTI - advanced to PO abx      RECOMMEND:  (1)Meds as ordered/dose new meds for GFR 35-40ml/min  (2)No renal objection to discharge; can f/u at my office on a prn basis    Spike Quach MD  [facility]  Office/on call physician: [phone]  Cell (7a-7p): [phone]

## 2023-05-17 NOTE — PROGRESS NOTE ADULT - SUBJECTIVE AND OBJECTIVE BOX
Date of service: 05-17-23 @ 22:42      Patient is a 72y old  Female who presents with a chief complaint of FTT (16 May 2023 10:13)                                                               INTERVAL HPI/OVERNIGHT EVENTS:    REVIEW OF SYSTEMS:     CONSTITUTIONAL: No weakness, fevers or chills  RESPIRATORY: No cough, wheezing,  No shortness of breath  CARDIOVASCULAR: No chest pain or palpitations  GASTROINTESTINAL: No abdominal pain  . No nausea, vomiting, or hematemesis; No diarrhea or constipation. No melena or hematochezia.  GENITOURINARY: No dysuria, frequency or hematuria  NEUROLOGICAL: No numbness or weakness                                                                                                                                                                                                                                                                                 Medications:  MEDICATIONS  (STANDING):  amLODIPine   Tablet 5 milliGRAM(s) Oral daily  ARIPiprazole 5 milliGRAM(s) Oral daily  ascorbic acid 500 milliGRAM(s) Oral daily  cefuroxime   Tablet 250 milliGRAM(s) Oral every 12 hours  chlorhexidine 2% Cloths 1 Application(s) Topical daily  enoxaparin Injectable 30 milliGRAM(s) SubCutaneous every 24 hours  levothyroxine 100 MICROGram(s) Oral daily  LORazepam     Tablet 0.75 milliGRAM(s) Oral at bedtime  multivitamin 1 Tablet(s) Oral daily  senna 2 Tablet(s) Oral at bedtime    MEDICATIONS  (PRN):  LORazepam     Tablet 0.5 milliGRAM(s) Oral every 4 hours PRN CIWA-Ar score 8 or greater       Allergies    No Known Allergies    Intolerances      Vital Signs Last 24 Hrs  T(C): 36.8 (17 May 2023 21:01), Max: 37 (17 May 2023 01:51)  T(F): 98.2 (17 May 2023 21:01), Max: 98.6 (17 May 2023 01:51)  HR: 102 (17 May 2023 21:01) (95 - 106)  BP: 114/74 (17 May 2023 21:01) (114/74 - 127/78)  BP(mean): --  RR: 18 (17 May 2023 21:01) (18 - 18)  SpO2: 99% (17 May 2023 21:01) (97% - 99%)    Parameters below as of 17 May 2023 21:01  Patient On (Oxygen Delivery Method): room air      CAPILLARY BLOOD GLUCOSE          05-16 @ 07:01  -  05-17 @ 07:00  --------------------------------------------------------  IN: 710 mL / OUT: 3450 mL / NET: -2740 mL    05-17 @ 07:01  -  05-17 @ 22:42  --------------------------------------------------------  IN: 660 mL / OUT: 1800 mL / NET: -1140 mL      Physical Exam:    Daily     Daily   General:  Well appearing, NAD, not cachetic  HEENT:  Nonicteric, PERRLA  CV:  RRR, S1S2   Lungs:  CTA B/L, no wheezes, rales, rhonchi  Abdomen:  Soft, non-tender, no distended, positive BS  Extremities:  2+ pulses, no c/c, no edema  Skin:  Warm and dry, no rashes  :gab  Neuro:  AAOx3, non-focal, grossly intact                                                                                                                                                                                                                                                                                                LABS:                            05-17    x   |  x   |  x   ----------------------------<  x   x    |  x   |  1.46<H>    Ca    9.9      17 May 2023 07:20    TPro  6.7  /  Alb  3.5  /  TBili  0.4  /  DBili  0.1  /  AST  38  /  ALT  48<H>  /  AlkPhos  106  05-17                       RADIOLOGY & ADDITIONAL TESTS         I personally reviewed: [  ]EKG   [  ]CXR    [  ] CT      A/P:         Discussed with :     Becky consultants' Notes   Time spent :

## 2023-05-17 NOTE — PROGRESS NOTE ADULT - ASSESSMENT
72-year-old female with history of bipolar disorder and dementia ? hypothyroidism s/p thyroidectomy (had thyroid cancer)   comes into the ED via EMS for failure to thrive.  Spoke with her niece,  Mylene  at [phone], who lives in [place] and is the patient's healthcare proxy.   Pt is a  , lives by herself ... her neighbor would take her food shopping. belongs to [employer] who also help.   she is incoherent and " withdrawn" and confused...  Lost her " common sense"   was not eating or drinking and lost 20lbs   She used to live with her  that he passed away in September 2020.  She was initially depressed for a brief period of time but returned to her baseline and was active until a month ago.  Her niece reports she used to be on lithium for her bipolar but has not been treated for the past 8 years.  she also reports she used to be on valium for " extreme insomnia " which pt had stopped for " sometime...    Currently in the emergency department patient does not complain of any pain or discomfort.  She states she has a decreased appetite and does not have any pain, nausea, vomiting or inability to eat.  Patient is not able to provide much of the history and she keeps stating she feels out of it and is unable to concentrate on anything.    pt was found to have COVID positive RVP.. a month ago she had fever , vomiting    pt is not able to provide any information  partially because she is hard of hearing     - COVID  infection unclear time of exposure   due to recent changes in status : given remdesivir, completed  no indication for dexa, she remain stable on room air  cont to monitor   ID consult appreciated    abd distention; urinary retention   monitor and placed De Guzman per protocol   failed TOV     uti; complete ctx   However, CT abd with right sided pyelo?  ID f/u requested. Restarted Ceftrin PO given CT scan findings of pyelo.     hx of bipolar: has not been on meds and does not seem to be actively psychotic   psych  following    FTT with hypernatremia: ivf D5W  monitor Na    JAYLA : monitor  likely multifactorial including urinary retention, de guzman in place. mimproving   renal input     tachycardia/elevated BP : ? sec to infection /hyperthyroid /withdrwal /anxiety/ dehydration ?  cont abx   benzo per psych   decreased synthroid   FU with endo   c/w D5W for hypernatremia    breast lesion: per report ..pt refused exam . Chaperon  present at the time   should follow up with outpt PCP and mammogram nonurgently     hypernatremia : monitor   appreciate nephro input   d/w nephro : no evidence of DI         DVT ppx: no PE on CTangio. switch back to Lovenox 30 mg qdaily ppx dose  awaiting rehab     d/w HCP GOC  will fu final decision but does not seem to want her to be resuscitated ..had not signed papers in past

## 2023-05-17 NOTE — PROGRESS NOTE ADULT - ASSESSMENT
Assessment  72y female with history of thyroid cancer, bipolar d/o, lives alone and by report has been very withdrawn, very poor  po intake and  has recent weight loss, no acute events.   Hypothyroidism: On Synthroid 112 mcg po daily, TSH 0.06, FT4 2.2. Now Hyperthyroid. Thyroxine adjusted, Hx of Thyroidectomy due to thyroid cancer.  Thyroglobulin/AB negative. Asymptomatic.   COVID+: supportive care, on remdesivir. On isolation precaution   Hypercalcemia: mild, dehydration  resolved s/p IVF hydration.         Discussed plan and management with Dr Blanca Sharp NP - TEAMS  Sloan Rios MD  Cell: [phone]  Office: [phone]                Assessment  72y female with history of thyroid cancer, bipolar d/o, lives alone and by report has been very withdrawn, very poor  po intake and  has  recent weight loss, no acute events.   Hypothyroidism: On Synthroid 112 mcg po daily, TSH 0.06, FT4 2.2. Now Hyperthyroid. Thyroxine adjusted, Hx of Thyroidectomy due to thyroid cancer.  Thyroglobulin/AB negative. Asymptomatic.   COVID+: supportive care, on remdesivir. On isolation precaution   Hypercalcemia: mild, dehydration  resolved s/p IVF hydration.         Discussed plan and management with Dr Blanca Sharp NP - TEAMS  Sloan Rios MD  Cell: [phone]  Office: [phone]

## 2023-05-17 NOTE — PROGRESS NOTE ADULT - SUBJECTIVE AND OBJECTIVE BOX
Chief complaint  Patient is a 72y old  Female who presents with a chief complaint of FTT (16 May 2023 10:13)         Labs and Fingersticks  CAPILLARY BLOOD GLUCOSE          Anion Gap, Serum: 12 (05-17 @ 07:20)  Anion Gap, Serum: 10 (05-16 @ 07:11)      Calcium, Total Serum: 9.9 (05-17 @ 07:20)  Calcium, Total Serum: 9.8 (05-16 @ 07:11)  Albumin, Serum: 3.5 (05-17 @ 07:26)  Albumin, Serum: 3.3 (05-16 @ 07:11)    Alanine Aminotransferase (ALT/SGPT): 48 *H* (05-17 @ 07:26)  Alanine Aminotransferase (ALT/SGPT): 59 *H* (05-16 @ 07:11)  Alkaline Phosphatase, Serum: 106 (05-17 @ 07:26)  Alkaline Phosphatase, Serum: 98 (05-16 @ 07:11)  Aspartate Aminotransferase (AST/SGOT): 38 (05-17 @ 07:26)  Aspartate Aminotransferase (AST/SGOT): 60 *H* (05-16 @ 07:11)        05-17    x   |  x   |  x   ----------------------------<  x   x    |  x   |  1.46<H>    Ca    9.9      17 May 2023 07:20    TPro  6.7  /  Alb  3.5  /  TBili  0.4  /  DBili  0.1  /  AST  38  /  ALT  48<H>  /  AlkPhos  106  05-17    Medications  MEDICATIONS  (STANDING):  amLODIPine   Tablet 5 milliGRAM(s) Oral daily  ARIPiprazole 5 milliGRAM(s) Oral daily  ascorbic acid 500 milliGRAM(s) Oral daily  cefuroxime   Tablet 250 milliGRAM(s) Oral every 12 hours  chlorhexidine 2% Cloths 1 Application(s) Topical daily  enoxaparin Injectable 30 milliGRAM(s) SubCutaneous every 24 hours  levothyroxine 100 MICROGram(s) Oral daily  LORazepam     Tablet 0.75 milliGRAM(s) Oral at bedtime  multivitamin 1 Tablet(s) Oral daily  senna 2 Tablet(s) Oral at bedtime      Physical Exam  General: Patient comfortable in bed   Vital Signs Last 12 Hrs  T(F): 97.3 (05-17-23 @ 10:00), Max: 98.6 (05-17-23 @ 01:51)  HR: 105 (05-17-23 @ 10:00) (95 - 105)  BP: 115/79 (05-17-23 @ 10:00) (114/76 - 127/78)  BP(mean): --  RR: 18 (05-17-23 @ 10:00) (18 - 18)  SpO2: 99% (05-17-23 @ 10:00) (98% - 99%)    CVS: S1S2   Respiratory: No wheezing, no crepitations  GI: Abdomen soft, bowel sounds positive  Musculoskeletal:  moves all extremities  : Voiding        Chief complaint  Patient is a 72y old  Female who presents with a chief complaint of FTT (16 May 2023 10:13)         Labs and Fingersticks  CAPILLARY BLOOD GLUCOSE          Anion Gap, Serum: 12 (05-17 @ 07:20)  Anion Gap, Serum: 10 (05-16 @ 07:11)      Calcium, Total Serum: 9.9 (05-17 @ 07:20)  Calcium, Total Serum: 9.8 (05-16 @ 07:11)  Albumin, Serum: 3.5 (05-17 @ 07:26)  Albumin, Serum: 3.3 (05-16 @ 07:11)    Alanine Aminotransferase (ALT/SGPT): 48 *H* (05-17 @ 07:26)  Alanine Aminotransferase (ALT/SGPT): 59 *H* (05-16 @ 07:11)  Alkaline Phosphatase, Serum: 106 (05-17 @ 07:26)  Alkaline Phosphatase, Serum: 98 (05-16 @ 07:11)  Aspartate Aminotransferase (AST/SGOT): 38 (05-17 @ 07:26)  Aspartate Aminotransferase (AST/SGOT): 60 *H* (05-16 @ 07:11)        05-17    x   |  x   |  x   ----------------------------<  x   x    |  x   |  1.46<H>    Ca    9.9      17 May 2023 07:20    TPro  6.7  /  Alb  3.5  /  TBili  0.4  /  DBili  0.1  /  AST  38  /  ALT  48<H>  /  AlkPhos  106  05-17    Medications  MEDICATIONS  (STANDING):  amLODIPine   Tablet 5 milliGRAM(s) Oral daily  ARIPiprazole 5 milliGRAM(s) Oral daily  ascorbic acid 500 milliGRAM(s) Oral daily  cefuroxime   Tablet 250 milliGRAM(s) Oral every 12 hours  chlorhexidine 2% Cloths 1 Application(s) Topical daily  enoxaparin Injectable 30 milliGRAM(s) SubCutaneous every 24 hours  levothyroxine 100 MICROGram(s) Oral daily  LORazepam     Tablet 0.75 milliGRAM(s) Oral at bedtime  multivitamin 1 Tablet(s) Oral daily  senna 2 Tablet(s) Oral at bedtime      Physical Exam  General: Patient comfortable in bed   Vital Signs Last 12 Hrs  T(F): 97.3 (05-17-23 @ 10:00), Max: 98.6 (05-17-23 @ 01:51)  HR: 105 (05-17-23 @ 10:00) (95 - 105)  BP: 115/79 (05-17-23 @ 10:00) (114/76 - 127/78)  BP(mean): --  RR: 18 (05-17-23 @ 10:00) (18 - 18)  SpO2: 99% (05-17-23 @ 10:00) (98% - 99%)    CVS: S1S2   Respiratory: No wheezing, no crepitations  GI: Abdomen soft, bowel sounds positive  Musculoskeletal:  moves all extremities  : Voiding

## 2023-05-17 NOTE — BH CONSULTATION LIAISON PROGRESS NOTE - NSBHFUPINTERVALHXFT_PSY_A_CORE
RN reports the pt. still frequently asks for fluids but sodium improved. No agitation. Less paranoid on Abilify. LFTs reduced from yesterday.

## 2023-05-17 NOTE — BH CONSULTATION LIAISON PROGRESS NOTE - NSBHASSESSMENTFT_PSY_ALL_CORE
Delirium  Less paranoid on Abilify  Rule out baseline MCI or early dementia  Bipolar disorder. Off psychiatric meds for years prior to admission

## 2023-05-18 LAB
ALBUMIN SERPL ELPH-MCNC: 3.4 G/DL — SIGNIFICANT CHANGE UP (ref 3.3–5)
ALP SERPL-CCNC: 106 U/L — SIGNIFICANT CHANGE UP (ref 40–120)
ALT FLD-CCNC: 38 U/L — SIGNIFICANT CHANGE UP (ref 10–45)
ANION GAP SERPL CALC-SCNC: 10 MMOL/L — SIGNIFICANT CHANGE UP (ref 5–17)
AST SERPL-CCNC: 28 U/L — SIGNIFICANT CHANGE UP (ref 10–40)
BILIRUB DIRECT SERPL-MCNC: 0.1 MG/DL — SIGNIFICANT CHANGE UP (ref 0–0.3)
BILIRUB INDIRECT FLD-MCNC: 0.3 MG/DL — SIGNIFICANT CHANGE UP (ref 0.2–1)
BILIRUB SERPL-MCNC: 0.4 MG/DL — SIGNIFICANT CHANGE UP (ref 0.2–1.2)
BUN SERPL-MCNC: 28 MG/DL — HIGH (ref 7–23)
CALCIUM SERPL-MCNC: 10.1 MG/DL — SIGNIFICANT CHANGE UP (ref 8.4–10.5)
CHLORIDE SERPL-SCNC: 105 MMOL/L — SIGNIFICANT CHANGE UP (ref 96–108)
CO2 SERPL-SCNC: 29 MMOL/L — SIGNIFICANT CHANGE UP (ref 22–31)
CREAT SERPL-MCNC: 1.35 MG/DL — HIGH (ref 0.5–1.3)
EGFR: 41 ML/MIN/1.73M2 — LOW
GLUCOSE SERPL-MCNC: 92 MG/DL — SIGNIFICANT CHANGE UP (ref 70–99)
POTASSIUM SERPL-MCNC: 3.8 MMOL/L — SIGNIFICANT CHANGE UP (ref 3.5–5.3)
POTASSIUM SERPL-SCNC: 3.8 MMOL/L — SIGNIFICANT CHANGE UP (ref 3.5–5.3)
PROT SERPL-MCNC: 6.6 G/DL — SIGNIFICANT CHANGE UP (ref 6–8.3)
SODIUM SERPL-SCNC: 144 MMOL/L — SIGNIFICANT CHANGE UP (ref 135–145)

## 2023-05-18 RX ORDER — AMLODIPINE BESYLATE 2.5 MG/1
1 TABLET ORAL
Qty: 0 | Refills: 0 | DISCHARGE
Start: 2023-05-18

## 2023-05-18 RX ORDER — LEVOTHYROXINE SODIUM 125 MCG
1 TABLET ORAL
Qty: 0 | Refills: 0 | DISCHARGE
Start: 2023-05-18

## 2023-05-18 RX ORDER — LEVOTHYROXINE SODIUM 125 MCG
1 TABLET ORAL
Refills: 0 | DISCHARGE

## 2023-05-18 RX ORDER — ARIPIPRAZOLE 15 MG/1
1 TABLET ORAL
Qty: 0 | Refills: 0 | DISCHARGE
Start: 2023-05-18

## 2023-05-18 RX ORDER — SENNA PLUS 8.6 MG/1
2 TABLET ORAL
Qty: 0 | Refills: 0 | DISCHARGE
Start: 2023-05-18

## 2023-05-18 RX ORDER — MIRTAZAPINE 45 MG/1
7.5 TABLET, ORALLY DISINTEGRATING ORAL AT BEDTIME
Refills: 0 | Status: DISCONTINUED | OUTPATIENT
Start: 2023-05-18 | End: 2023-05-21

## 2023-05-18 RX ORDER — ASCORBIC ACID 60 MG
1 TABLET,CHEWABLE ORAL
Qty: 0 | Refills: 0 | DISCHARGE
Start: 2023-05-18

## 2023-05-18 RX ADMIN — Medication 100 MICROGRAM(S): at 05:07

## 2023-05-18 RX ADMIN — AMLODIPINE BESYLATE 5 MILLIGRAM(S): 2.5 TABLET ORAL at 05:08

## 2023-05-18 RX ADMIN — SENNA PLUS 2 TABLET(S): 8.6 TABLET ORAL at 21:07

## 2023-05-18 RX ADMIN — Medication 1 TABLET(S): at 12:22

## 2023-05-18 RX ADMIN — ARIPIPRAZOLE 5 MILLIGRAM(S): 15 TABLET ORAL at 12:22

## 2023-05-18 RX ADMIN — ENOXAPARIN SODIUM 30 MILLIGRAM(S): 100 INJECTION SUBCUTANEOUS at 15:04

## 2023-05-18 RX ADMIN — Medication 500 MILLIGRAM(S): at 12:22

## 2023-05-18 RX ADMIN — Medication 250 MILLIGRAM(S): at 17:10

## 2023-05-18 RX ADMIN — CHLORHEXIDINE GLUCONATE 1 APPLICATION(S): 213 SOLUTION TOPICAL at 12:22

## 2023-05-18 RX ADMIN — Medication 250 MILLIGRAM(S): at 05:08

## 2023-05-18 RX ADMIN — Medication 0.75 MILLIGRAM(S): at 21:07

## 2023-05-18 NOTE — PROGRESS NOTE ADULT - SUBJECTIVE AND OBJECTIVE BOX
Chief complaint  Patient is a 72y old  Female who presents with a chief complaint of FTT (16 May 2023 10:13)         Labs and Fingersticks  CAPILLARY BLOOD GLUCOSE          Anion Gap, Serum: 10 (05-18 @ 08:09)  Anion Gap, Serum: 12 (05-17 @ 07:20)      Calcium, Total Serum: 10.1 (05-18 @ 08:09)  Calcium, Total Serum: 9.9 (05-17 @ 07:20)  Albumin, Serum: 3.4 (05-18 @ 08:09)  Albumin, Serum: 3.5 (05-17 @ 07:26)    Alanine Aminotransferase (ALT/SGPT): 38 (05-18 @ 08:09)  Alanine Aminotransferase (ALT/SGPT): 48 *H* (05-17 @ 07:26)  Alkaline Phosphatase, Serum: 106 (05-18 @ 08:09)  Alkaline Phosphatase, Serum: 106 (05-17 @ 07:26)  Aspartate Aminotransferase (AST/SGOT): 28 (05-18 @ 08:09)  Aspartate Aminotransferase (AST/SGOT): 38 (05-17 @ 07:26)        05-18    144  |  105  |  28<H>  ----------------------------<  92  3.8   |  29  |  1.35<H>    Ca    10.1      18 May 2023 08:09    TPro  6.6  /  Alb  3.4  /  TBili  0.4  /  DBili  0.1  /  AST  28  /  ALT  38  /  AlkPhos  106  05-18    Medications  MEDICATIONS  (STANDING):  amLODIPine   Tablet 5 milliGRAM(s) Oral daily  ARIPiprazole 5 milliGRAM(s) Oral daily  ascorbic acid 500 milliGRAM(s) Oral daily  cefuroxime   Tablet 250 milliGRAM(s) Oral every 12 hours  chlorhexidine 2% Cloths 1 Application(s) Topical daily  enoxaparin Injectable 30 milliGRAM(s) SubCutaneous every 24 hours  levothyroxine 100 MICROGram(s) Oral daily  LORazepam     Tablet 0.75 milliGRAM(s) Oral at bedtime  multivitamin 1 Tablet(s) Oral daily  senna 2 Tablet(s) Oral at bedtime      Physical Exam  General: Patient comfortable in bed   Vital Signs Last 12 Hrs  T(F): 98.3 (05-18-23 @ 10:28), Max: 98.5 (05-18-23 @ 02:00)  HR: 101 (05-18-23 @ 10:28) (101 - 111)  BP: 132/77 (05-18-23 @ 10:28) (117/76 - 132/77)  BP(mean): --  RR: 18 (05-18-23 @ 10:28) (18 - 18)  SpO2: 98% (05-18-23 @ 10:28) (98% - 100%)    CVS: S1S2   Respiratory: No wheezing, no crepitations  GI: Abdomen soft, bowel sounds positive  Musculoskeletal:  moves all extremities  : Voiding        Chief complaint  Patient is a 72y old  Female who presents with a chief complaint of FTT (16 May 2023 10:13)         Labs and Fingersticks  CAPILLARY BLOOD GLUCOSE          Anion Gap, Serum: 10 (05-18 @ 08:09)  Anion Gap, Serum: 12 (05-17 @ 07:20)      Calcium, Total Serum: 10.1 (05-18 @ 08:09)  Calcium, Total Serum: 9.9 (05-17 @ 07:20)  Albumin, Serum: 3.4 (05-18 @ 08:09)  Albumin, Serum: 3.5 (05-17 @ 07:26)    Alanine Aminotransferase (ALT/SGPT): 38 (05-18 @ 08:09)  Alanine Aminotransferase (ALT/SGPT): 48 *H* (05-17 @ 07:26)  Alkaline Phosphatase, Serum: 106 (05-18 @ 08:09)  Alkaline Phosphatase, Serum: 106 (05-17 @ 07:26)  Aspartate Aminotransferase (AST/SGOT): 28 (05-18 @ 08:09)  Aspartate Aminotransferase (AST/SGOT): 38 (05-17 @ 07:26)        05-18    144  |  105  |  28<H>  ----------------------------<  92  3.8   |  29  |  1.35<H>    Ca    10.1      18 May 2023 08:09    TPro  6.6  /  Alb  3.4  /  TBili  0.4  /  DBili  0.1  /  AST  28  /  ALT  38  /  AlkPhos  106  05-18    Medications  MEDICATIONS  (STANDING):  amLODIPine   Tablet 5 milliGRAM(s) Oral daily  ARIPiprazole 5 milliGRAM(s) Oral daily  ascorbic acid 500 milliGRAM(s) Oral daily  cefuroxime   Tablet 250 milliGRAM(s) Oral every 12 hours  chlorhexidine 2% Cloths 1 Application(s) Topical daily  enoxaparin Injectable 30 milliGRAM(s) SubCutaneous every 24 hours  levothyroxine 100 MICROGram(s) Oral daily  LORazepam     Tablet 0.75 milliGRAM(s) Oral at bedtime  multivitamin 1 Tablet(s) Oral daily  senna 2 Tablet(s) Oral at bedtime      Physical Exam  General: Patient comfortable in bed   Vital Signs Last 12 Hrs  T(F): 98.3 (05-18-23 @ 10:28), Max: 98.5 (05-18-23 @ 02:00)  HR: 101 (05-18-23 @ 10:28) (101 - 111)  BP: 132/77 (05-18-23 @ 10:28) (117/76 - 132/77)  BP(mean): --  RR: 18 (05-18-23 @ 10:28) (18 - 18)  SpO2: 98% (05-18-23 @ 10:28) (98% - 100%)    CVS: S1S2   Respiratory: No wheezing, no crepitations  GI: Abdomen soft, bowel sounds positive  Musculoskeletal:  moves all extremities  : Voiding

## 2023-05-18 NOTE — PROGRESS NOTE ADULT - SUBJECTIVE AND OBJECTIVE BOX
Date of service: 05-18-23 @ 22:36      Patient is a 72y old  Female who presents with a chief complaint of FTT (18 May 2023 16:50)                                                               INTERVAL HPI/OVERNIGHT EVENTS:    REVIEW OF SYSTEMS:     " i do not feel well" but she is not specific what she means  all ROS otherwise NEG                                                                                                                                                                                                                                                                        Medications:  MEDICATIONS  (STANDING):  amLODIPine   Tablet 5 milliGRAM(s) Oral daily  ARIPiprazole 5 milliGRAM(s) Oral daily  ascorbic acid 500 milliGRAM(s) Oral daily  chlorhexidine 2% Cloths 1 Application(s) Topical daily  enoxaparin Injectable 30 milliGRAM(s) SubCutaneous every 24 hours  levothyroxine 100 MICROGram(s) Oral daily  LORazepam     Tablet 0.75 milliGRAM(s) Oral at bedtime  multivitamin 1 Tablet(s) Oral daily  senna 2 Tablet(s) Oral at bedtime    MEDICATIONS  (PRN):       Allergies    No Known Allergies    Intolerances      Vital Signs Last 24 Hrs  T(C): 36.5 (18 May 2023 21:04), Max: 36.9 (18 May 2023 02:00)  T(F): 97.7 (18 May 2023 21:04), Max: 98.5 (18 May 2023 02:00)  HR: 103 (18 May 2023 21:04) (101 - 111)  BP: 132/79 (18 May 2023 21:04) (117/76 - 132/79)  BP(mean): --  RR: 18 (18 May 2023 21:04) (18 - 18)  SpO2: 100% (18 May 2023 21:04) (97% - 100%)    Parameters below as of 18 May 2023 21:04  Patient On (Oxygen Delivery Method): room air      CAPILLARY BLOOD GLUCOSE          05-17 @ 07:01  -  05-18 @ 07:00  --------------------------------------------------------  IN: 660 mL / OUT: 3000 mL / NET: -2340 mL    05-18 @ 07:01  -  05-18 @ 22:36  --------------------------------------------------------  IN: 220 mL / OUT: 1700 mL / NET: -1480 mL      Physical Exam:    Daily     Daily   General:  cachetic  HEENT:  Nonicteric, PERRLA  CV:  RRR, S1S2   Lungs:  CTA B/L, no wheezes, rales, rhonchi  Abdomen:  Soft, non-tender, no distended, positive BS  Extremities: no edema   Neuro:  NF                                                                                                                                                                                                                                                                                      LABS:                            05-18    144  |  105  |  28<H>  ----------------------------<  92  3.8   |  29  |  1.35<H>    Ca    10.1      18 May 2023 08:09    TPro  6.6  /  Alb  3.4  /  TBili  0.4  /  DBili  0.1  /  AST  28  /  ALT  38  /  AlkPhos  106  05-18                       RADIOLOGY & ADDITIONAL TESTS         I personally reviewed: [  ]EKG   [  ]CXR    [  ] CT      A/P:         Discussed with :     Becky consultants' Notes   Time spent :

## 2023-05-18 NOTE — BH CONSULTATION LIAISON PROGRESS NOTE - NSBHFUPINTERVALHXFT_PSY_A_CORE
The pt. reports eating well. However, she is anxious about not having clothes and wants staff to call her neighbor for this. No austen or agitation. Tachycardic but normal blood pressure.

## 2023-05-18 NOTE — PROGRESS NOTE ADULT - ASSESSMENT
72-year-old female with history of bipolar disorder and dementia ? hypothyroidism s/p thyroidectomy (had thyroid cancer)   comes into the ED via EMS for failure to thrive.  Spoke with her niece,  Mylene  at [phone], who lives in [place] and is the patient's healthcare proxy.   Pt is a  , lives by herself ... her neighbor would take her food shopping. belongs to [employer] who also help.   she is incoherent and " withdrawn" and confused...  Lost her " common sense"   was not eating or drinking and lost 20lbs   She used to live with her  that he passed away in September 2020.  She was initially depressed for a brief period of time but returned to her baseline and was active until a month ago.  Her niece reports she used to be on lithium for her bipolar but has not been treated for the past 8 years.  she also reports she used to be on valium for " extreme insomnia " which pt had stopped for " sometime...    Currently in the emergency department patient does not complain of any pain or discomfort.  She states she has a decreased appetite and does not have any pain, nausea, vomiting or inability to eat.  Patient is not able to provide much of the history and she keeps stating she feels out of it and is unable to concentrate on anything.    pt was found to have COVID positive RVP.. a month ago she had fever , vomiting    pt is not able to provide any information  partially because she is hard of hearing     - COVID  infection unclear time of exposure   due to recent changes in status : given remdesivir, completed  no indication for dexa, she remain stable on room air  cont to monitor   ID consult appreciated     urinary retention   monitor and placed De Guzman per protocol   failed TOV   REattempt     uti; complete ctx   However, CT abd with right sided pyelo?  ID f/u requested. Restarted Ceftrin PO given CT scan findings of pyelo.     hx of bipolar: has not been on meds and does not seem to be actively psychotic   psych  following    FTT with hypernatremia: ivf D5W  monitor Na    JAYLA : monitor  likely multifactorial including urinary retention, de guzman in place. mimproving   renal input     tachycardia/elevated BP : ? sec to infection /hyperthyroid /withdrwal /anxiety/ dehydration ?  cont abx   benzo per psych   decreased synthroid   FU with endo   c/w D5W for hypernatremia    breast lesion: per report ..pt refused exam . Chaperon  present at the time   should follow up with outpt PCP and mammogram nonurgently     hypernatremia : monitor   appreciate nephro input   d/w nephro : no evidence of DI     called niece : discussed all above at length and answered alll questions and addressed all concerns..  PT meidcally stable for dc  to rehab ( if accepted )   fu as op       d/w HCP GOC  will fu final decision but does not seem to want her to be resuscitated ..had not signed papers in past

## 2023-05-18 NOTE — BH CONSULTATION LIAISON PROGRESS NOTE - NSBHCONSULTMEDSEVERE_PSY_A_CORE FT
Haldol 0.50mg IV q8h prn severe agitation (hold if QTc is 500ms or greater)
Haldol 0.50mg IV q8h prn (hold if QTc is 500ms or greater)
Haldol 0.50mg IV q8h prn (hold if QTc is 500ms or greater)
Haldol 0.50mg IV q6h prn (hold if QTc is 500ms or greater)
Haldol 0.50mg IV q8h prn (hold if QTc is 500ms or greater)

## 2023-05-18 NOTE — PROGRESS NOTE ADULT - SUBJECTIVE AND OBJECTIVE BOX
CC: f/u for  covid and uti  Patient reports  she wants the de guzman removed  REVIEW OF SYSTEMS:  All other review of systems negative (Comprehensive ROS)    Antimicrobials Day #  :10/10  cefuroxime   Tablet 250 milliGRAM(s) Oral every 12 hours    Other Medications Reviewed    T(F): 98.3 (05-18-23 @ 10:28), Max: 98.5 (05-18-23 @ 02:00)  HR: 101 (05-18-23 @ 10:28)  BP: 132/77 (05-18-23 @ 10:28)  RR: 18 (05-18-23 @ 13:29)  SpO2: 99% (05-18-23 @ 13:29)  Wt(kg): --    PHYSICAL EXAM:  General: alert, no acute distress  Eyes:  anicteric, no conjunctival injection, no discharge  Oropharynx: no lesions or injection 	  Neck: supple, without adenopathy  Lungs: clear to auscultation  Heart: regular rate and rhythm; no murmur, rubs or gallops  Abdomen: soft, nondistended, nontender, without mass or organomegaly  Skin: no lesions  Extremities: no clubbing, cyanosis, or edema  Neurologic: alert, oriented, moves all extremities    LAB RESULTS:    05-18    144  |  105  |  28<H>  ----------------------------<  92  3.8   |  29  |  1.35<H>    Ca    10.1      18 May 2023 08:09    TPro  6.6  /  Alb  3.4  /  TBili  0.4  /  DBili  0.1  /  AST  28  /  ALT  38  /  AlkPhos  106  05-18    LIVER FUNCTIONS - ( 18 May 2023 08:09 )  Alb: 3.4 g/dL / Pro: 6.6 g/dL / ALK PHOS: 106 U/L / ALT: 38 U/L / AST: 28 U/L / GGT: x             MICROBIOLOGY:  RECENT CULTURES:      RADIOLOGY REVIEWED:      < from: CT Angio Chest PE Protocol w/ IV Cont (05.12.23 @ 21:06) >    ACC: [id number] EXAM:  CT ABDOMEN AND PELVIS OC IC   ORDERED BY: SUNNY CONTI     ACC: [id number] EXAM:  CT ANGIO CHEST PULM ART WAWIC   ORDERED BY: JESSICA STEPHENS     PROCEDURE DATE:  05/12/2023          INTERPRETATION:  CLINICAL INFORMATION: COVID- positive. Abdominal   distention and urinary retention. UTI.    COMPARISON: None.    CONTRAST/COMPLICATIONS:  IV Contrast: IV contrast documented in unlinked concurrent exam  Oral Contrast: Omnipaque 300  Complications: None reported at time of study completion    PROCEDURE:  CT Angiography of the Chest was performed followed by portal venous phase   imaging of the Abdomen and Pelvis.  Sagittal and coronal reformats were performed as well as 3D (MIP)   reconstructions.    FINDINGS:  CHEST:  LUNGS AND LARGE AIRWAYS: Patent central airways. No pulmonary   consolidation or suspicious nodules.  PLEURA: No pleural effusion.  VESSELS: No pulmonary embolus. Aorta is of normal caliber  HEART: Heart size is normal. No pericardial effusion.  MEDIASTINUM AND EUNICE: No lymphadenopathy.  CHEST WALL AND LOWER NECK: Innumerable rim calcified nodules in bilateral   breasts..    ABDOMEN AND PELVIS:  LIVER: Within normal limits.  BILE DUCTS: Normal caliber.  GALLBLADDER: Within normal limits.  SPLEEN: Within normal limits.  PANCREAS: Within normal limits.  ADRENALS: Within normal limits.  KIDNEYS/URETERS: Innumerable bilateral cysts and subcentimeter   indeterminant hypodense foci that are too small to characterize.   Wedge-shaped focus of hypoenhancement in the interpolar region of the   right kidney. Bilateral urothelial enhancement. No drainable fluid   collection. No hydronephrosis..    BLADDER: Decompressed urinary bladder contains air and a De Guzman catheter.  REPRODUCTIVE ORGANS: Hysterectomy.    BOWEL: Rectal distention with stool. No bowel obstruction. Appendix is   normal.  PERITONEUM: No ascites.  VESSELS: Within normal limits.  RETROPERITONEUM/LYMPH NODES: No lymphadenopathy.  ABDOMINAL WALL: Within normal limits.  BONES: Within normal limits.    IMPRESSION:  No pulmonary consolidation.    Focal area of wedge-shaped hypoenhancement in the interpolar region of   the right kidney with bilateral urothelial enhancement likely reflecting   pyelitis and right-sided focal pyelonephritis. Please correlate with   urinalysis. No drainable fluid collection.    Decompressed urinary bladder.    --- End of Report ---    < end of copied text >          Assessment:  Patient with history of thyroid ca s/p thyroidectomy, bipolar d/o, admitted for FTT, confused, poor intake. Found to have covid without hypoxia but treated in case it was contributing to her encephalopathy. She was found to have urinary retention and now has a de guzman. She also was found to have ecoli in the urine with radiographic evidence of pyelo so now has finished 10 days of abx with ceftin. No active infection is apparent at present.   Plan:  can stop ceftin today as planned  de guzman per primary team    please call if further input is needed

## 2023-05-18 NOTE — BH CONSULTATION LIAISON PROGRESS NOTE - NSBHASSESSMENTFT_PSY_ALL_CORE
Bipolar disorder  Delirium (Covid, UTI, hyperthyroid)  Appetite improved  On Ativan 0.75mg qhs. Other than tachycardia, no sx. of benzodiazepine withdrawal.

## 2023-05-18 NOTE — PROGRESS NOTE ADULT - SUBJECTIVE AND OBJECTIVE BOX
Overnight events noted      VITAL:  T(C): , Max: 36.9 (05-18-23 @ 02:00)  T(F): , Max: 98.5 (05-18-23 @ 02:00)  HR: 101 (05-18-23 @ 10:28)  BP: 132/77 (05-18-23 @ 10:28)  BP(mean): --  RR: 18 (05-18-23 @ 13:29)  SpO2: 99% (05-18-23 @ 13:29)  Wt(kg): --      PHYSICAL EXAM:  Constitutional: Frail, NAD  HEENT: DMM  Neck:  No JVD  Respiratory: CTAB/L  Cardiovascular: tachy S1 and S2  Gastrointestinal: BS+, soft, NT/ND  Extremities: No peripheral edema  Neurological: reduced generalized strength  : + Carranza  Skin: No rashes      LABS:    Na(144)/K(3.8)/Cl(105)/HCO3(29)/BUN(28)/Cr(1.35)Glu(92)/Ca(10.1)/Mg(--)/PO4(--)    05-18 @ 08:09  Na(--)/K(--)/Cl(--)/HCO3(--)/BUN(--)/Cr(1.46)Glu(--)/Ca(--)/Mg(--)/PO4(--)    05-17 @ 07:26  Na(143)/K(3.6)/Cl(105)/HCO3(26)/BUN(34)/Cr(1.47)Glu(93)/Ca(9.9)/Mg(--)/PO4(--)    05-17 @ 07:20  Na(141)/K(3.9)/Cl(104)/HCO3(27)/BUN(33)/Cr(1.47)Glu(104)/Ca(9.8)/Mg(--)/PO4(--)    05-16 @ 07:11      Osmolality, Random Urine: 217 mos/kg (05-17 @ 07:28)      ASSESSMENT:  72 F w/ thyroid CA-resection, and resultant hypothyroidism 5/8/23 p/w loss of appetite, 20lb weight loss, and AMS     (1)Renal - CKD3 - likely from hypertensive nephrosclerosis - stable numbers over past few days    (2)Hypernatremia - resolved    (3)CV - acceptable BP/volume, off IVF and on Amlodipine    (4)UTI - on PO Ceftin      RECOMMEND:  (1)Meds as ordered/dose new meds for GFR 35-40ml/min  (2)Reconsult as needed          Spike Quach MD  [facility]  Office/on call physician: [phone]  Cell (7a-7p): [phone]

## 2023-05-18 NOTE — PROGRESS NOTE ADULT - ASSESSMENT
Assessment  72y female with history of thyroid cancer, bipolar d/o, lives alone and by report has been very withdrawn, very poor  po intake and  has  recent weight loss, no acute events.   Hypothyroidism: On Synthroid 112 mcg po daily, TSH 0.06, FT4 2.2. Now Hyperthyroid. Thyroxine adjusted, decreased dose,  Hx of Thyroidectomy due to thyroid cancer.  Thyroglobulin/AB negative. Asymptomatic.   COVID+: supportive care, on remdesivir. On isolation precautions   Hypercalcemia: mild, dehydration  resolved s/p IVF hydration, stable.         Discussed plan and management with Dr Blanca Sharp NP - TEAMS  Sloan Rios MD  Cell: [phone]  Office: [phone]                Assessment  72y female with history of thyroid cancer, bipolar d/o, lives alone and by report has been very withdrawn, very poor  po intake and  has  recent weight loss,  no acute events.   Hypothyroidism: On Synthroid 112 mcg po daily, TSH 0.06, FT4 2.2. Now Hyperthyroid. Thyroxine adjusted, decreased dose,  Hx of Thyroidectomy due to thyroid cancer.  Thyroglobulin/AB negative. Asymptomatic.   COVID+: supportive care, on remdesivir. On isolation precautions   Hypercalcemia: mild, dehydration  resolved s/p IVF hydration, stable.         Discussed plan and management with Dr Blanca Sharp NP - TEAMS  Sloan Rios MD  Cell: [phone]  Office: [phone]

## 2023-05-19 LAB
ANION GAP SERPL CALC-SCNC: 12 MMOL/L — SIGNIFICANT CHANGE UP (ref 5–17)
BUN SERPL-MCNC: 25 MG/DL — HIGH (ref 7–23)
CALCIUM SERPL-MCNC: 10.2 MG/DL — SIGNIFICANT CHANGE UP (ref 8.4–10.5)
CHLORIDE SERPL-SCNC: 101 MMOL/L — SIGNIFICANT CHANGE UP (ref 96–108)
CO2 SERPL-SCNC: 26 MMOL/L — SIGNIFICANT CHANGE UP (ref 22–31)
CREAT SERPL-MCNC: 1.28 MG/DL — SIGNIFICANT CHANGE UP (ref 0.5–1.3)
EGFR: 44 ML/MIN/1.73M2 — LOW
GLUCOSE SERPL-MCNC: 95 MG/DL — SIGNIFICANT CHANGE UP (ref 70–99)
HCT VFR BLD CALC: 42.9 % — SIGNIFICANT CHANGE UP (ref 34.5–45)
HGB BLD-MCNC: 14.9 G/DL — SIGNIFICANT CHANGE UP (ref 11.5–15.5)
MCHC RBC-ENTMCNC: 31.4 PG — SIGNIFICANT CHANGE UP (ref 27–34)
MCHC RBC-ENTMCNC: 34.7 GM/DL — SIGNIFICANT CHANGE UP (ref 32–36)
MCV RBC AUTO: 90.3 FL — SIGNIFICANT CHANGE UP (ref 80–100)
NRBC # BLD: 0 /100 WBCS — SIGNIFICANT CHANGE UP (ref 0–0)
PLATELET # BLD AUTO: 221 K/UL — SIGNIFICANT CHANGE UP (ref 150–400)
POTASSIUM SERPL-MCNC: 3.6 MMOL/L — SIGNIFICANT CHANGE UP (ref 3.5–5.3)
POTASSIUM SERPL-SCNC: 3.6 MMOL/L — SIGNIFICANT CHANGE UP (ref 3.5–5.3)
RBC # BLD: 4.75 M/UL — SIGNIFICANT CHANGE UP (ref 3.8–5.2)
RBC # FLD: 12.9 % — SIGNIFICANT CHANGE UP (ref 10.3–14.5)
SODIUM SERPL-SCNC: 139 MMOL/L — SIGNIFICANT CHANGE UP (ref 135–145)
WBC # BLD: 7.08 K/UL — SIGNIFICANT CHANGE UP (ref 3.8–10.5)
WBC # FLD AUTO: 7.08 K/UL — SIGNIFICANT CHANGE UP (ref 3.8–10.5)

## 2023-05-19 RX ORDER — POLYETHYLENE GLYCOL 3350 17 G/17G
17 POWDER, FOR SOLUTION ORAL DAILY
Refills: 0 | Status: DISCONTINUED | OUTPATIENT
Start: 2023-05-19 | End: 2023-05-22

## 2023-05-19 RX ORDER — RIVAROXABAN 15 MG-20MG
1 KIT ORAL
Qty: 0 | Refills: 0 | DISCHARGE
Start: 2023-05-19

## 2023-05-19 RX ORDER — RIVAROXABAN 15 MG-20MG
10 KIT ORAL DAILY
Refills: 0 | Status: DISCONTINUED | OUTPATIENT
Start: 2023-05-19 | End: 2023-05-22

## 2023-05-19 RX ORDER — MIRTAZAPINE 45 MG/1
1 TABLET, ORALLY DISINTEGRATING ORAL
Qty: 0 | Refills: 0 | DISCHARGE
Start: 2023-05-19

## 2023-05-19 RX ADMIN — MIRTAZAPINE 7.5 MILLIGRAM(S): 45 TABLET, ORALLY DISINTEGRATING ORAL at 21:20

## 2023-05-19 RX ADMIN — AMLODIPINE BESYLATE 5 MILLIGRAM(S): 2.5 TABLET ORAL at 05:12

## 2023-05-19 RX ADMIN — RIVAROXABAN 10 MILLIGRAM(S): KIT at 18:48

## 2023-05-19 RX ADMIN — SENNA PLUS 2 TABLET(S): 8.6 TABLET ORAL at 21:20

## 2023-05-19 RX ADMIN — Medication 100 MICROGRAM(S): at 05:11

## 2023-05-19 RX ADMIN — Medication 1 TABLET(S): at 12:38

## 2023-05-19 RX ADMIN — ARIPIPRAZOLE 5 MILLIGRAM(S): 15 TABLET ORAL at 12:37

## 2023-05-19 RX ADMIN — POLYETHYLENE GLYCOL 3350 17 GRAM(S): 17 POWDER, FOR SOLUTION ORAL at 18:48

## 2023-05-19 RX ADMIN — Medication 0.75 MILLIGRAM(S): at 21:20

## 2023-05-19 RX ADMIN — Medication 500 MILLIGRAM(S): at 12:38

## 2023-05-19 NOTE — PROGRESS NOTE ADULT - ASSESSMENT
72-year-old female with history of bipolar disorder and dementia ? hypothyroidism s/p thyroidectomy (had thyroid cancer)   comes into the ED via EMS for failure to thrive.  Spoke with her niece,  Mylene  at [phone], who lives in [place] and is the patient's healthcare proxy.   Pt is a  , lives by herself ... her neighbor would take her food shopping. belongs to [employer] who also help.   she is incoherent and " withdrawn" and confused...  Lost her " common sense"   was not eating or drinking and lost 20lbs   She used to live with her  that he passed away in September 2020.  She was initially depressed for a brief period of time but returned to her baseline and was active until a month ago.  Her niece reports she used to be on lithium for her bipolar but has not been treated for the past 8 years.  she also reports she used to be on valium for " extreme insomnia " which pt had stopped for " sometime...    Currently in the emergency department patient does not complain of any pain or discomfort.  She states she has a decreased appetite and does not have any pain, nausea, vomiting or inability to eat.  Patient is not able to provide much of the history and she keeps stating she feels out of it and is unable to concentrate on anything.    pt was found to have COVID positive RVP.. a month ago she had fever , vomiting    pt is not able to provide any information  partially because she is hard of hearing     - COVID  infection unclear time of exposure   due to recent changes in status : given remdesivir, completed  no indication for dexa, she remain stable on room air  cont to monitor   ID consult appreciated     urinary retention   monitor and placed De Guzman per protocol   failed TOV   Re- attempt     uti; complete ctx   However, CT abd with right sided pyelo?  ID f/u requested. Restarted Ceftrin PO given CT scan findings of pyelo.     hx of bipolar: has not been on meds and does not seem to be actively psychotic   psych  following    FTT with hypernatremia: ivf D5W  monitor Na    JAYLA : monitor  likely multifactorial including urinary retention, de guzman in place. improving   renal input     tachycardia/elevated BP : ? sec to infection /hyperthyroid withdrawal /anxiety/ dehydration ?  cont abx   benzo per psych   decreased synthroid   FU with endo   c/w D5W for hypernatremia    breast lesion: per report ..pt refused exam . Chaperon  present at the time   should follow up with outpt PCP and mammogram non-urgently     hypernatremia : monitor   appreciate nephro input   d/w nephro : no evidence of DI     called niece : discussed all above at length and answered alll questions and addressed all concerns..  PT medically stable for dc  to rehab ( if accepted )   fu as op   with SW   called Niece : left message .. pt is medically stable for DC with or without de guzman as I had discussed at length with her yesterday     d/w HCP GOC  will fu final decision but does not seem to want her to be resuscitated ..had not signed papers in past

## 2023-05-19 NOTE — PROGRESS NOTE ADULT - TIME BILLING
pt , am , acp and renal
pt ,   called clarence   psych and acp
clarence KLEIN acp
pt,  called clarence  : left message
with SW   called Niece : left message .. pt is medically stable for DC with or without de guzman as I had discussed at length with her yesterday

## 2023-05-19 NOTE — PROGRESS NOTE ADULT - SUBJECTIVE AND OBJECTIVE BOX
Date of service: 05-19-23 @ 13:58      Patient is a 72y old  Female who presents with a chief complaint of FTT (18 May 2023 16:50)                                                               INTERVAL HPI/OVERNIGHT EVENTS:    REVIEW OF SYSTEMS:     CONSTITUTIONAL: No weakness, fevers or chills  RESPIRATORY: No cough, wheezing,  No shortness of breath  CARDIOVASCULAR: No chest pain or palpitations  GASTROINTESTINAL: No abdominal pain  . No nausea, vomiting, or hematemesis; No diarrhea or constipation. No melena or hematochezia.  GENITOURINARY: No dysuria, frequency or hematuria  NEUROLOGICAL: No numbness or weakness                                                                                                                                                                                                                                                                               Medications:  MEDICATIONS  (STANDING):  amLODIPine   Tablet 5 milliGRAM(s) Oral daily  ARIPiprazole 5 milliGRAM(s) Oral daily  ascorbic acid 500 milliGRAM(s) Oral daily  chlorhexidine 2% Cloths 1 Application(s) Topical daily  enoxaparin Injectable 30 milliGRAM(s) SubCutaneous every 24 hours  levothyroxine 100 MICROGram(s) Oral daily  LORazepam     Tablet 0.75 milliGRAM(s) Oral at bedtime  mirtazapine 7.5 milliGRAM(s) Oral at bedtime  multivitamin 1 Tablet(s) Oral daily  polyethylene glycol 3350 17 Gram(s) Oral daily  senna 2 Tablet(s) Oral at bedtime    MEDICATIONS  (PRN):       Allergies    No Known Allergies    Intolerances      Vital Signs Last 24 Hrs  T(C): 36.6 (19 May 2023 12:07), Max: 36.9 (19 May 2023 00:56)  T(F): 97.8 (19 May 2023 12:07), Max: 98.5 (19 May 2023 00:56)  HR: 109 (19 May 2023 12:07) (103 - 113)  BP: 146/93 (19 May 2023 12:07) (121/82 - 146/93)  BP(mean): --  RR: 18 (19 May 2023 12:07) (18 - 18)  SpO2: 99% (19 May 2023 12:07) (97% - 100%)    Parameters below as of 19 May 2023 12:07  Patient On (Oxygen Delivery Method): room air      CAPILLARY BLOOD GLUCOSE          05-18 @ 07:01  -  05-19 @ 07:00  --------------------------------------------------------  IN: 220 mL / OUT: 1700 mL / NET: -1480 mL    05-19 @ 07:01  -  05-19 @ 13:58  --------------------------------------------------------  IN: 640 mL / OUT: 0 mL / NET: 640 mL      Physical Exam:    Daily     Daily   General: NAD   HEENT:  Nonicteric, PERRLA  CV:  RRR, S1S2   Lungs:  CTA B/L, no wheezes, rales, rhonchi  Abdomen:  Soft, non-tender, no distended, positive BS  Extremities:  no edema                                                                                                                                                                                                                                                                                              LABS:                               14.9   7.08  )-----------( 221      ( 19 May 2023 06:55 )             42.9                      05-19    139  |  101  |  25<H>  ----------------------------<  95  3.6   |  26  |  1.28    Ca    10.2      19 May 2023 06:57    TPro  6.6  /  Alb  3.4  /  TBili  0.4  /  DBili  0.1  /  AST  28  /  ALT  38  /  AlkPhos  106  05-18                       RADIOLOGY & ADDITIONAL TESTS         I personally reviewed: [  ]EKG   [  ]CXR    [  ] CT      A/P:         Discussed with :     Becky consultants' Notes   Time spent :

## 2023-05-19 NOTE — BH CONSULTATION LIAISON PROGRESS NOTE - NSBHFUPINTERVALHXFT_PSY_A_CORE
No agitation. Off isolation. Has appetite. RN reports that the staff tried to reach her neighbor but given the wrong number. Per RN, pt. with low urine output so this needs monitoring prior to going to rehab. No austen nor depression.

## 2023-05-19 NOTE — BH CONSULTATION LIAISON PROGRESS NOTE - NSBHCHARTREVIEWLAB_PSY_A_CORE FT
CBC Full  -  ( 11 May 2023 07:40 )  WBC Count : 8.22 K/uL  Hemoglobin : 16.4 g/dL  Hematocrit : 48.9 %  Platelet Count - Automated : 182 K/uL  Mean Cell Volume : 93.0 fl  Mean Cell Hemoglobin : 31.2 pg  Mean Cell Hemoglobin Concentration : 33.5 gm/dL  Auto Neutrophil # : x  Auto Lymphocyte # : x  Auto Monocyte # : x  Auto Eosinophil # : x  Auto Basophil # : x  Auto Neutrophil % : x  Auto Lymphocyte % : x  Auto Monocyte % : x  Auto Eosinophil % : x  Auto Basophil % : x    05-11    151<H>  |  114<H>  |  29<H>  ----------------------------<  102<H>  4.3   |  26  |  1.60<H>    Ca    9.7      11 May 2023 07:40    TPro  6.3  /  Alb  3.7  /  TBili  0.4  /  DBili  0.1  /  AST  36  /  ALT  28  /  AlkPhos  78  05-11  Folate, Serum in AM (05.09.23 @ 07:42)   Folate, Serum: >20.0 ng/mL  
CBC Full  -  ( 14 May 2023 07:00 )  WBC Count : 7.12 K/uL  Hemoglobin : 16.0 g/dL  Hematocrit : 47.2 %  Platelet Count - Automated : 172 K/uL  Mean Cell Volume : 90.9 fl  Mean Cell Hemoglobin : 30.8 pg  Mean Cell Hemoglobin Concentration : 33.9 gm/dL  Auto Neutrophil # : x  Auto Lymphocyte # : x  Auto Monocyte # : x  Auto Eosinophil # : x  Auto Basophil # : x  Auto Neutrophil % : x  Auto Lymphocyte % : x  Auto Monocyte % : x  Auto Eosinophil % : x  Auto Basophil % : x    05-15    140  |  103  |  28<H>  ----------------------------<  106<H>  3.8   |  24  |  1.32<H>    Ca    9.4      15 May 2023 07:02  Mg     2.4     05-14    TPro  5.9<L>  /  Alb  3.0<L>  /  TBili  0.6  /  DBili  0.2  /  AST  41<H>  /  ALT  36  /  AlkPhos  91  05-15  
CBC Full  -  ( 19 May 2023 06:55 )  WBC Count : 7.08 K/uL  Hemoglobin : 14.9 g/dL  Hematocrit : 42.9 %  Platelet Count - Automated : 221 K/uL  Mean Cell Volume : 90.3 fl  Mean Cell Hemoglobin : 31.4 pg  Mean Cell Hemoglobin Concentration : 34.7 gm/dL  Auto Neutrophil # : x  Auto Lymphocyte # : x  Auto Monocyte # : x  Auto Eosinophil # : x  Auto Basophil # : x  Auto Neutrophil % : x  Auto Lymphocyte % : x  Auto Monocyte % : x  Auto Eosinophil % : x  Auto Basophil % : x    05-19    139  |  101  |  25<H>  ----------------------------<  95  3.6   |  26  |  1.28    Ca    10.2      19 May 2023 06:57    TPro  6.6  /  Alb  3.4  /  TBili  0.4  /  DBili  0.1  /  AST  28  /  ALT  38  /  AlkPhos  106  05-18  
CBC Full  -  ( 12 May 2023 07:04 )  WBC Count : 8.29 K/uL  Hemoglobin : 15.6 g/dL  Hematocrit : 46.3 %  Platelet Count - Automated : 181 K/uL  Mean Cell Volume : 93.5 fl  Mean Cell Hemoglobin : 31.5 pg  Mean Cell Hemoglobin Concentration : 33.7 gm/dL  Auto Neutrophil # : x  Auto Lymphocyte # : x  Auto Monocyte # : x  Auto Eosinophil # : x  Auto Basophil # : x  Auto Neutrophil % : x  Auto Lymphocyte % : x  Auto Monocyte % : x  Auto Eosinophil % : x  Auto Basophil % : x    05-12    153<H>  |  117<H>  |  29<H>  ----------------------------<  99  3.9   |  24  |  1.47<H>    Ca    9.7      12 May 2023 07:11  Phos  3.1     05-12  Mg     2.2     05-12    TPro  6.4  /  Alb  3.3  /  TBili  0.4  /  DBili  0.1  /  AST  23  /  ALT  25  /  AlkPhos  86  05-12  
  05-16    141  |  104  |  33<H>  ----------------------------<  104<H>  3.9   |  27  |  1.47<H>    Ca    9.8      16 May 2023 07:11    TPro  6.4  /  Alb  3.3  /  TBili  0.4  /  DBili  0.1  /  AST  60<H>  /  ALT  59<H>  /  AlkPhos  98  05-16  
  05-17    x   |  x   |  x   ----------------------------<  x   x    |  x   |  1.46<H>    Ca    9.9      17 May 2023 07:20    TPro  6.7  /  Alb  3.5  /  TBili  0.4  /  DBili  0.1  /  AST  38  /  ALT  48<H>  /  AlkPhos  106  05-17  
  05-18    144  |  105  |  28<H>  ----------------------------<  92  3.8   |  29  |  1.35<H>    Ca    10.1      18 May 2023 08:09    TPro  6.6  /  Alb  3.4  /  TBili  0.4  /  DBili  0.1  /  AST  28  /  ALT  38  /  AlkPhos  106  05-18

## 2023-05-19 NOTE — PROGRESS NOTE ADULT - SUBJECTIVE AND OBJECTIVE BOX
Chief complaint  Patient is a 72y old  Female who presents with a chief complaint of FTT (18 May 2023 16:50)         Labs and Fingersticks  CAPILLARY BLOOD GLUCOSE          Anion Gap, Serum: 12 (05-19 @ 06:57)  Anion Gap, Serum: 10 (05-18 @ 08:09)      Calcium, Total Serum: 10.2 (05-19 @ 06:57)  Calcium, Total Serum: 10.1 (05-18 @ 08:09)  Albumin, Serum: 3.4 (05-18 @ 08:09)    Alanine Aminotransferase (ALT/SGPT): 38 (05-18 @ 08:09)  Alkaline Phosphatase, Serum: 106 (05-18 @ 08:09)  Aspartate Aminotransferase (AST/SGOT): 28 (05-18 @ 08:09)        05-19    139  |  101  |  25<H>  ----------------------------<  95  3.6   |  26  |  1.28    Ca    10.2      19 May 2023 06:57    TPro  6.6  /  Alb  3.4  /  TBili  0.4  /  DBili  0.1  /  AST  28  /  ALT  38  /  AlkPhos  106  05-18                        14.9   7.08  )-----------( 221      ( 19 May 2023 06:55 )             42.9     Medications  MEDICATIONS  (STANDING):  amLODIPine   Tablet 5 milliGRAM(s) Oral daily  ARIPiprazole 5 milliGRAM(s) Oral daily  ascorbic acid 500 milliGRAM(s) Oral daily  chlorhexidine 2% Cloths 1 Application(s) Topical daily  enoxaparin Injectable 30 milliGRAM(s) SubCutaneous every 24 hours  levothyroxine 100 MICROGram(s) Oral daily  LORazepam     Tablet 0.75 milliGRAM(s) Oral at bedtime  mirtazapine 7.5 milliGRAM(s) Oral at bedtime  multivitamin 1 Tablet(s) Oral daily  polyethylene glycol 3350 17 Gram(s) Oral daily  senna 2 Tablet(s) Oral at bedtime      Physical Exam  General: Patient comfortable in chair   Vital Signs Last 12 Hrs  T(F): 97.8 (05-19-23 @ 12:07), Max: 97.9 (05-19-23 @ 05:28)  HR: 109 (05-19-23 @ 12:07) (109 - 112)  BP: 146/93 (05-19-23 @ 12:07) (128/76 - 146/93)  BP(mean): --  RR: 18 (05-19-23 @ 12:07) (18 - 18)  SpO2: 99% (05-19-23 @ 12:07) (97% - 99%)    CVS: S1S2   Respiratory: No wheezing, no crepitations  GI: Abdomen soft, bowel sounds positive  Musculoskeletal:  moves all extremities  : Voiding          Chief complaint  Patient is a 72y old  Female who presents with a chief complaint of FTT (18 May 2023 16:50)     Labs and Fingersticks  CAPILLARY BLOOD GLUCOSE          Anion Gap, Serum: 12 (05-19 @ 06:57)  Anion Gap, Serum: 10 (05-18 @ 08:09)      Calcium, Total Serum: 10.2 (05-19 @ 06:57)  Calcium, Total Serum: 10.1 (05-18 @ 08:09)  Albumin, Serum: 3.4 (05-18 @ 08:09)    Alanine Aminotransferase (ALT/SGPT): 38 (05-18 @ 08:09)  Alkaline Phosphatase, Serum: 106 (05-18 @ 08:09)  Aspartate Aminotransferase (AST/SGOT): 28 (05-18 @ 08:09)        05-19    139  |  101  |  25<H>  ----------------------------<  95  3.6   |  26  |  1.28    Ca    10.2      19 May 2023 06:57    TPro  6.6  /  Alb  3.4  /  TBili  0.4  /  DBili  0.1  /  AST  28  /  ALT  38  /  AlkPhos  106  05-18                        14.9   7.08  )-----------( 221      ( 19 May 2023 06:55 )             42.9     Medications  MEDICATIONS  (STANDING):  amLODIPine   Tablet 5 milliGRAM(s) Oral daily  ARIPiprazole 5 milliGRAM(s) Oral daily  ascorbic acid 500 milliGRAM(s) Oral daily  chlorhexidine 2% Cloths 1 Application(s) Topical daily  enoxaparin Injectable 30 milliGRAM(s) SubCutaneous every 24 hours  levothyroxine 100 MICROGram(s) Oral daily  LORazepam     Tablet 0.75 milliGRAM(s) Oral at bedtime  mirtazapine 7.5 milliGRAM(s) Oral at bedtime  multivitamin 1 Tablet(s) Oral daily  polyethylene glycol 3350 17 Gram(s) Oral daily  senna 2 Tablet(s) Oral at bedtime      Physical Exam  General: Patient comfortable in chair   Vital Signs Last 12 Hrs  T(F): 97.8 (05-19-23 @ 12:07), Max: 97.9 (05-19-23 @ 05:28)  HR: 109 (05-19-23 @ 12:07) (109 - 112)  BP: 146/93 (05-19-23 @ 12:07) (128/76 - 146/93)  BP(mean): --  RR: 18 (05-19-23 @ 12:07) (18 - 18)  SpO2: 99% (05-19-23 @ 12:07) (97% - 99%)    CVS: S1S2   Respiratory: No wheezing, no crepitations  GI: Abdomen soft, bowel sounds positive  Musculoskeletal:  moves all extremities  : Voiding

## 2023-05-19 NOTE — PROGRESS NOTE ADULT - ASSESSMENT
Assessment  72y female with history of thyroid cancer, bipolar d/o, lives alone and by report has been very withdrawn, very poor  po intake and  has  recent weight loss,  no acute events.   Hypothyroidism: On Synthroid 112 mcg po daily, TSH 0.06, FT4 2.2. Now Hyperthyroid. Thyroxine adjusted, decreased dose,  Hx of Thyroidectomy due to thyroid cancer.  Thyroglobulin/AB negative. Asymptomatic.   COVID+: supportive care, s/p remdesivir. now off isolation precautions. stable on room air    Hypercalcemia: mild, dehydration  resolved s/p IVF hydration, stable.         Discussed plan and management with Dr Blanca Sharp NP - TEAMS  Sloan Rios MD  Cell: [phone]  Office: [phone]                Assessment  72y female with history of thyroid cancer, bipolar d/o, lives alone and by report has been very withdrawn, very poor  po intake and  has  recent weight loss,  no acute events.   Hypothyroidism: On Synthroid 112 mcg po daily, TSH 0.06, FT4 2.2. Now Hyperthyroid. Thyroxine adjusted, decreased dose,  Hx of Thyroidectomy due to thyroid cancer.  Thyroglobulin/AB  negative. Asymptomatic.   COVID+: supportive care, s/p remdesivir. now off isolation precautions. stable on room air    Hypercalcemia: mild, dehydration  resolved s/p IVF hydration, stable.         Discussed plan and management with Dr Blanca Sharp NP - TEAMS  Sloan Rios MD  Cell: [phone]  Office: [phone]

## 2023-05-20 LAB
ANION GAP SERPL CALC-SCNC: 13 MMOL/L — SIGNIFICANT CHANGE UP (ref 5–17)
BUN SERPL-MCNC: 19 MG/DL — SIGNIFICANT CHANGE UP (ref 7–23)
CALCIUM SERPL-MCNC: 10.3 MG/DL — SIGNIFICANT CHANGE UP (ref 8.4–10.5)
CHLORIDE SERPL-SCNC: 103 MMOL/L — SIGNIFICANT CHANGE UP (ref 96–108)
CO2 SERPL-SCNC: 24 MMOL/L — SIGNIFICANT CHANGE UP (ref 22–31)
CREAT SERPL-MCNC: 1.18 MG/DL — SIGNIFICANT CHANGE UP (ref 0.5–1.3)
EGFR: 48 ML/MIN/1.73M2 — LOW
GLUCOSE SERPL-MCNC: 96 MG/DL — SIGNIFICANT CHANGE UP (ref 70–99)
POTASSIUM SERPL-MCNC: 3.9 MMOL/L — SIGNIFICANT CHANGE UP (ref 3.5–5.3)
POTASSIUM SERPL-SCNC: 3.9 MMOL/L — SIGNIFICANT CHANGE UP (ref 3.5–5.3)
SODIUM SERPL-SCNC: 140 MMOL/L — SIGNIFICANT CHANGE UP (ref 135–145)

## 2023-05-20 RX ADMIN — POLYETHYLENE GLYCOL 3350 17 GRAM(S): 17 POWDER, FOR SOLUTION ORAL at 18:32

## 2023-05-20 RX ADMIN — MIRTAZAPINE 7.5 MILLIGRAM(S): 45 TABLET, ORALLY DISINTEGRATING ORAL at 21:10

## 2023-05-20 RX ADMIN — ARIPIPRAZOLE 5 MILLIGRAM(S): 15 TABLET ORAL at 12:55

## 2023-05-20 RX ADMIN — SENNA PLUS 2 TABLET(S): 8.6 TABLET ORAL at 21:10

## 2023-05-20 RX ADMIN — AMLODIPINE BESYLATE 5 MILLIGRAM(S): 2.5 TABLET ORAL at 05:07

## 2023-05-20 RX ADMIN — CHLORHEXIDINE GLUCONATE 1 APPLICATION(S): 213 SOLUTION TOPICAL at 12:57

## 2023-05-20 RX ADMIN — Medication 500 MILLIGRAM(S): at 12:55

## 2023-05-20 RX ADMIN — RIVAROXABAN 10 MILLIGRAM(S): KIT at 18:35

## 2023-05-20 RX ADMIN — Medication 100 MICROGRAM(S): at 05:07

## 2023-05-20 RX ADMIN — Medication 1 TABLET(S): at 12:55

## 2023-05-20 NOTE — PROGRESS NOTE ADULT - ASSESSMENT
72-year-old female with history of bipolar disorder and dementia ? hypothyroidism s/p thyroidectomy (had thyroid cancer)   comes into the ED via EMS for failure to thrive.  Spoke with her niece,  Mylene  at [phone], who lives in [place] and is the patient's healthcare proxy.   Pt is a  , lives by herself ... her neighbor would take her food shopping. belongs to [employer] who also help.   she is incoherent and " withdrawn" and confused...  Lost her " common sense"   was not eating or drinking and lost 20lbs   She used to live with her  that he passed away in September 2020.  She was initially depressed for a brief period of time but returned to her baseline and was active until a month ago.  Her niece reports she used to be on lithium for her bipolar but has not been treated for the past 8 years.  she also reports she used to be on valium for " extreme insomnia " which pt had stopped for " sometime...    Currently in the emergency department patient does not complain of any pain or discomfort.  She states she has a decreased appetite and does not have any pain, nausea, vomiting or inability to eat.  Patient is not able to provide much of the history and she keeps stating she feels out of it and is unable to concentrate on anything.    pt was found to have COVID positive RVP.. a month ago she had fever , vomiting    pt is not able to provide any information  partially because she is hard of hearing     - COVID  infection unclear time of exposure   due to recent changes in status : given remdesivir, completed  no indication for dexa, she remain stable on room air  cont to monitor   ID consult appreciated     urinary retention   monitor and placed De Guzman per protocol   Re- attempted TOV   : failed     uti; complete ctx   However, CT abd with right sided pyelo?  ID f/u requested. Restarted Ceftrin PO given CT scan findings of pyelo.     hx of bipolar: has not been on meds and does not seem to be actively psychotic   psych  following    FTT with hypernatremia: ivf D5W  monitor Na    JAYLA : monitor  likely multifactorial including urinary retention, de guzman in place. improving   renal input     tachycardia/elevated BP : ? sec to infection /hyperthyroid withdrawal /anxiety/ dehydration ?  cont abx   benzo per psych   decreased synthroid   FU with endo   c/w D5W for hypernatremia    breast lesion: per report ..pt refused exam . Chaperon  present at the time   should follow up with outpt PCP and mammogram non-urgently     hypernatremia : monitor   appreciate nephro input   d/w nephro : no evidence of DI     called niece : discussed all above at length and answered alll questions and addressed all concerns..  PT medically stable for dc  to rehab ( if accepted )   fu as op   with SW   called Niece : left message .. pt is medically stable for DC with or without de guzman as I had discussed at length with her yesterday     d/w HCP [facility]  will fu final decision but does not seem to want her to be resuscitated ..had not signed papers in past

## 2023-05-20 NOTE — PROGRESS NOTE ADULT - SUBJECTIVE AND OBJECTIVE BOX
Chief complaint    Patient is a 72y old  Female who presents with a chief complaint of FTT (18 May 2023 16:50)   Review of systems  Patient appears comfortable.    Labs and Fingersticks  CAPILLARY BLOOD GLUCOSE      Anion Gap, Serum: 13 (05-20 @ 06:44)  Anion Gap, Serum: 12 (05-19 @ 06:57)      Calcium, Total Serum: 10.3 (05-20 @ 06:44)  Calcium, Total Serum: 10.2 (05-19 @ 06:57)          05-20    140  |  103  |  19  ----------------------------<  96  3.9   |  24  |  1.18    Ca    10.3      20 May 2023 06:44                          14.9   7.08  )-----------( 221      ( 19 May 2023 06:55 )             42.9     Medications  MEDICATIONS  (STANDING):  amLODIPine   Tablet 5 milliGRAM(s) Oral daily  ARIPiprazole 5 milliGRAM(s) Oral daily  ascorbic acid 500 milliGRAM(s) Oral daily  chlorhexidine 2% Cloths 1 Application(s) Topical daily  levothyroxine 100 MICROGram(s) Oral daily  mirtazapine 7.5 milliGRAM(s) Oral at bedtime  multivitamin 1 Tablet(s) Oral daily  polyethylene glycol 3350 17 Gram(s) Oral daily  rivaroxaban 10 milliGRAM(s) Oral daily  senna 2 Tablet(s) Oral at bedtime      Physical Exam  General: Patient appears comfortable.  Vital Signs Last 12 Hrs  T(F): 98 (05-20-23 @ 04:17), Max: 98 (05-20-23 @ 04:17)  HR: 107 (05-20-23 @ 04:17) (107 - 107)  BP: 119/82 (05-20-23 @ 04:17) (119/82 - 119/82)  BP(mean): --  RR: 18 (05-20-23 @ 04:17) (18 - 18)  SpO2: 98% (05-20-23 @ 04:17) (98% - 98%)  Neck: No palpable thyroid nodules.  CVS: S1S2, No murmurs  Respiratory: No wheezing, no crepitations  GI: Abdomen soft, non tender.    Diagnostics    Free Thyroxine, Serum: AM Sched. Collection: 17-May-2023 06:00 (05-16 @ 07:48)      Radiology:

## 2023-05-20 NOTE — PROGRESS NOTE ADULT - SUBJECTIVE AND OBJECTIVE BOX
Date of service: 05-20-23 @ 23:38      Patient is a 72y old  Female who presents with a chief complaint of FTT (18 May 2023 16:50)                                                               INTERVAL HPI/OVERNIGHT EVENTS:    REVIEW OF SYSTEMS:     CONSTITUTIONAL: No weakness, fevers or chills  RESPIRATORY: No cough, wheezing,  No shortness of breath  CARDIOVASCULAR: No chest pain or palpitations  GASTROINTESTINAL: No abdominal pain  . No nausea, vomiting, or hematemesis; No diarrhea or constipation. No melena or hematochezia.  GENITOURINARY: No dysuria, frequency or hematuria  NEUROLOGICAL: No numbness or weakness                                                                                                                                                                                                                                                                              Medications:  MEDICATIONS  (STANDING):  amLODIPine   Tablet 5 milliGRAM(s) Oral daily  ARIPiprazole 5 milliGRAM(s) Oral daily  ascorbic acid 500 milliGRAM(s) Oral daily  chlorhexidine 2% Cloths 1 Application(s) Topical daily  levothyroxine 100 MICROGram(s) Oral daily  mirtazapine 7.5 milliGRAM(s) Oral at bedtime  multivitamin 1 Tablet(s) Oral daily  polyethylene glycol 3350 17 Gram(s) Oral daily  rivaroxaban 10 milliGRAM(s) Oral daily  senna 2 Tablet(s) Oral at bedtime    MEDICATIONS  (PRN):       Allergies    No Known Allergies    Intolerances      Vital Signs Last 24 Hrs  T(C): 36.7 (20 May 2023 20:10), Max: 36.8 (20 May 2023 12:16)  T(F): 98.1 (20 May 2023 20:10), Max: 98.2 (20 May 2023 12:16)  HR: 105 (20 May 2023 20:10) (105 - 117)  BP: 120/81 (20 May 2023 20:10) (119/66 - 122/81)  BP(mean): --  RR: 18 (20 May 2023 20:10) (18 - 18)  SpO2: 98% (20 May 2023 20:10) (97% - 98%)    Parameters below as of 20 May 2023 20:10  Patient On (Oxygen Delivery Method): room air      CAPILLARY BLOOD GLUCOSE          05-19 @ 07:01  -  05-20 @ 07:00  --------------------------------------------------------  IN: 1140 mL / OUT: 1835 mL / NET: -695 mL    05-20 @ 07:01  -  05-20 @ 23:39  --------------------------------------------------------  IN: 500 mL / OUT: 1100 mL / NET: -600 mL      Physical Exam:    Daily     Daily   General:   NAD   HEENT:  Nonicteric, PERRLA  CV:  RRR, S1S2   Lungs:  CTA B/L, no wheezes, rales, rhonchi  Abdomen:  Soft, non-tender, no distended, positive BS  Extremities:  no ruslan a  de guzman in place                                                                                                                                                                                                                                                                                            LABS:                               14.9   7.08  )-----------( 221      ( 19 May 2023 06:55 )             42.9                      05-20    140  |  103  |  19  ----------------------------<  96  3.9   |  24  |  1.18    Ca    10.3      20 May 2023 06:44                         RADIOLOGY & ADDITIONAL TESTS         I personally reviewed: [  ]EKG   [  ]CXR    [  ] CT      A/P:         Discussed with :     Becky consultants' Notes   Time spent :

## 2023-05-20 NOTE — PROGRESS NOTE ADULT - ASSESSMENT
Assessment  72y female with history of thyroid cancer, bipolar d/o, lives alone and by report has been very withdrawn, very poor  po intake and  has  recent weight loss, patient is stable..   Hypothyroidism: On Synthroid 112 mcg po daily, TSH 0.06, FT4 2.2. Now Hyperthyroid. Thyroxine adjusted, decreased dose,  Hx of Thyroidectomy due to thyroid cancer.  Thyroglobulin/AB  negative. Asymptomatic.   COVID+: supportive care, s/p remdesivir. now off isolation precautions. stable on room air    Hypercalcemia: mild, dehydration  resolved s/p IVF hydration, stable.         Sloan Rios MD  Cell: [phone]  Office: [phone]

## 2023-05-21 RX ORDER — ACETAMINOPHEN 500 MG
650 TABLET ORAL ONCE
Refills: 0 | Status: COMPLETED | OUTPATIENT
Start: 2023-05-21 | End: 2023-05-21

## 2023-05-21 RX ORDER — MIRTAZAPINE 45 MG/1
15 TABLET, ORALLY DISINTEGRATING ORAL AT BEDTIME
Refills: 0 | Status: DISCONTINUED | OUTPATIENT
Start: 2023-05-21 | End: 2023-05-22

## 2023-05-21 RX ADMIN — POLYETHYLENE GLYCOL 3350 17 GRAM(S): 17 POWDER, FOR SOLUTION ORAL at 17:35

## 2023-05-21 RX ADMIN — Medication 100 MICROGRAM(S): at 05:04

## 2023-05-21 RX ADMIN — SENNA PLUS 2 TABLET(S): 8.6 TABLET ORAL at 21:16

## 2023-05-21 RX ADMIN — Medication 650 MILLIGRAM(S): at 17:07

## 2023-05-21 RX ADMIN — Medication 650 MILLIGRAM(S): at 18:07

## 2023-05-21 RX ADMIN — MIRTAZAPINE 15 MILLIGRAM(S): 45 TABLET, ORALLY DISINTEGRATING ORAL at 21:17

## 2023-05-21 RX ADMIN — Medication 1 TABLET(S): at 11:18

## 2023-05-21 RX ADMIN — AMLODIPINE BESYLATE 5 MILLIGRAM(S): 2.5 TABLET ORAL at 05:04

## 2023-05-21 RX ADMIN — CHLORHEXIDINE GLUCONATE 1 APPLICATION(S): 213 SOLUTION TOPICAL at 11:31

## 2023-05-21 RX ADMIN — RIVAROXABAN 10 MILLIGRAM(S): KIT at 17:35

## 2023-05-21 RX ADMIN — Medication 500 MILLIGRAM(S): at 11:18

## 2023-05-21 RX ADMIN — ARIPIPRAZOLE 5 MILLIGRAM(S): 15 TABLET ORAL at 11:18

## 2023-05-21 NOTE — PROGRESS NOTE ADULT - SUBJECTIVE AND OBJECTIVE BOX
Date of service: 05-21-23 @ 12:30      Patient is a 72y old  Female who presents with a chief complaint of FTT (18 May 2023 16:50)                                                               INTERVAL HPI/OVERNIGHT EVENTS:    REVIEW OF SYSTEMS:    no cp/sob/n/v/d                                                                                                                                                                                                                                                                                 Medications:  MEDICATIONS  (STANDING):  amLODIPine   Tablet 5 milliGRAM(s) Oral daily  ARIPiprazole 5 milliGRAM(s) Oral daily  ascorbic acid 500 milliGRAM(s) Oral daily  chlorhexidine 2% Cloths 1 Application(s) Topical daily  levothyroxine 100 MICROGram(s) Oral daily  mirtazapine 7.5 milliGRAM(s) Oral at bedtime  multivitamin 1 Tablet(s) Oral daily  polyethylene glycol 3350 17 Gram(s) Oral daily  rivaroxaban 10 milliGRAM(s) Oral daily  senna 2 Tablet(s) Oral at bedtime    MEDICATIONS  (PRN):       Allergies    No Known Allergies    Intolerances      Vital Signs Last 24 Hrs  T(C): 36.6 (21 May 2023 04:20), Max: 36.7 (20 May 2023 20:10)  T(F): 97.9 (21 May 2023 04:20), Max: 98.1 (20 May 2023 20:10)  HR: 110 (21 May 2023 05:03) (102 - 114)  BP: 126/90 (21 May 2023 05:03) (116/79 - 126/90)  BP(mean): --  RR: 18 (21 May 2023 04:20) (18 - 18)  SpO2: 99% (21 May 2023 04:20) (97% - 99%)    Parameters below as of 21 May 2023 04:20  Patient On (Oxygen Delivery Method): room air      CAPILLARY BLOOD GLUCOSE          05-20 @ 07:01  -  05-21 @ 07:00  --------------------------------------------------------  IN: 500 mL / OUT: 2150 mL / NET: -1650 mL      Physical Exam:    Daily     Daily   General: NAD   HEENT:  Nonicteric, PERRLA  CV:  RRR, S1S2   Lungs:  CTA B/L, no wheezes, rales, rhonchi  Abdomen:  Soft, non-tender, no distended, positive BS  Extremities: no edema                                                                                                                                                                                                                                                                                      LABS:                            05-20    140  |  103  |  19  ----------------------------<  96  3.9   |  24  |  1.18    Ca    10.3      20 May 2023 06:44                         RADIOLOGY & ADDITIONAL TESTS         I personally reviewed: [  ]EKG   [  ]CXR    [  ] CT      A/P:         Discussed with :     Becky consultants' Notes   Time spent :

## 2023-05-21 NOTE — PROGRESS NOTE ADULT - ASSESSMENT
72-year-old female with history of bipolar disorder and dementia ? hypothyroidism s/p thyroidectomy (had thyroid cancer)   comes into the ED via EMS for failure to thrive.  Spoke with her niece,  Mylene  at [phone], who lives in [place] and is the patient's healthcare proxy.   Pt is a  , lives by herself ... her neighbor would take her food shopping. belongs to [employer] who also help.   she is incoherent and " withdrawn" and confused...  Lost her " common sense"   was not eating or drinking and lost 20lbs   She used to live with her  that he passed away in September 2020.  She was initially depressed for a brief period of time but returned to her baseline and was active until a month ago.  Her niece reports she used to be on lithium for her bipolar but has not been treated for the past 8 years.  she also reports she used to be on valium for " extreme insomnia " which pt had stopped for " sometime...    Currently in the emergency department patient does not complain of any pain or discomfort.  She states she has a decreased appetite and does not have any pain, nausea, vomiting or inability to eat.  Patient is not able to provide much of the history and she keeps stating she feels out of it and is unable to concentrate on anything.    pt was found to have COVID positive RVP.. a month ago she had fever , vomiting    pt is not able to provide any information  partially because she is hard of hearing     - COVID  infection unclear time of exposure   due to recent changes in status : given remdesivir, completed  no indication for dexa, she remain stable on room air  cont to monitor   ID consult appreciated     urinary retention   monitor and placed De Guzman per protocol   failed TOV   Re- attempt     uti; complete ctx   However, CT abd with right sided pyelo?  ID f/u requested. Restarted Ceftrin PO given CT scan findings of pyelo.     hx of bipolar: has not been on meds and does not seem to be actively psychotic   psych  following    FTT with hypernatremia: ivf D5W  monitor Na    JAYLA : monitor  likely multifactorial including urinary retention, de guzman in place. improving   renal input     tachycardia/elevated BP : ? sec to infection /hyperthyroid withdrawal /anxiety/ dehydration ?  cont abx   benzo per psych   decreased synthroid   FU with endo   c/w D5W for hypernatremia    breast lesion: per report ..pt refused exam . Chaperon  present at the time   should follow up with outpt PCP and mammogram non-urgently     hypernatremia : monitor   appreciate nephro input   d/w nephro : no evidence of DI     called niece : discussed all above at length and answered alll questions and addressed all concerns..  PT medically stable for dc  to rehab ( if accepted )   fu as op   with SW   called Niece : left message .. pt is medically stable for DC with or without de guzman as I had discussed at length with her yesterday     d/w HCP GOC  will fu final decision but does not seem to want her to be resuscitated ..had not signed papers in past

## 2023-05-21 NOTE — PROGRESS NOTE ADULT - ASSESSMENT
Assessment  72y female with history of thyroid cancer, bipolar d/o, lives alone and by report has been very withdrawn, very poor po intake and  has recent weight loss, patient is awake and alert, no overnight events.  Hypothyroidism: On Synthroid 112 mcg po daily, TSH 0.06, FT4 2.2. Now Hyperthyroid. Thyroxine adjusted, decreased dose,  Hx of Thyroidectomy due to thyroid cancer.  Thyroglobulin/AB  negative. Asymptomatic.   COVID+: supportive care, s/p remdesivir. now off isolation precautions. stable on room air    Hypercalcemia: mild, dehydration  resolved s/p IVF hydration, stable.         Sloan Rios MD  Cell: [phone]  Office: [phone]

## 2023-05-21 NOTE — PROGRESS NOTE ADULT - SUBJECTIVE AND OBJECTIVE BOX
Chief complaint    Patient is a 72y old  Female who presents with a chief complaint of FTT (18 May 2023 16:50)   Review of systems  Patient appears comfortable.    Labs and Fingersticks  CAPILLARY BLOOD GLUCOSE          Anion Gap, Serum: 13 (05-20 @ 06:44)      Calcium, Total Serum: 10.3 (05-20 @ 06:44)          05-20    140  |  103  |  19  ----------------------------<  96  3.9   |  24  |  1.18    Ca    10.3      20 May 2023 06:44      Medications  MEDICATIONS  (STANDING):  amLODIPine   Tablet 5 milliGRAM(s) Oral daily  ARIPiprazole 5 milliGRAM(s) Oral daily  ascorbic acid 500 milliGRAM(s) Oral daily  chlorhexidine 2% Cloths 1 Application(s) Topical daily  levothyroxine 100 MICROGram(s) Oral daily  mirtazapine 7.5 milliGRAM(s) Oral at bedtime  multivitamin 1 Tablet(s) Oral daily  polyethylene glycol 3350 17 Gram(s) Oral daily  rivaroxaban 10 milliGRAM(s) Oral daily  senna 2 Tablet(s) Oral at bedtime      Physical Exam  General: Patient appears comfortable.  Vital Signs Last 12 Hrs  T(F): 97.5 (05-21-23 @ 12:03), Max: 97.5 (05-21-23 @ 12:03)  HR: 112 (05-21-23 @ 12:03) (110 - 112)  BP: 117/76 (05-21-23 @ 12:03) (117/76 - 126/90)  BP(mean): --  RR: 18 (05-21-23 @ 12:03) (18 - 18)  SpO2: 98% (05-21-23 @ 12:03) (98% - 98%)  Neck: No palpable thyroid nodules.  CVS: S1S2, No murmurs  Respiratory: No wheezing, no crepitations  GI: Abdomen soft, non tender.    Diagnostics    Free Thyroxine, Serum: AM Sched. Collection: 22-May-2023 06:00 (05-21 @ 07:14)  Free Thyroxine, Serum: AM Sched. Collection: 17-May-2023 06:00 (05-16 @ 07:48)      Radiology:

## 2023-05-22 VITALS
HEART RATE: 119 BPM | TEMPERATURE: 97 F | OXYGEN SATURATION: 98 % | RESPIRATION RATE: 18 BRPM | SYSTOLIC BLOOD PRESSURE: 110 MMHG | DIASTOLIC BLOOD PRESSURE: 75 MMHG

## 2023-05-22 LAB
SARS-COV-2 RNA SPEC QL NAA+PROBE: SIGNIFICANT CHANGE UP
T4 FREE SERPL-MCNC: 2 NG/DL — HIGH (ref 0.9–1.8)

## 2023-05-22 PROCEDURE — 85652 RBC SED RATE AUTOMATED: CPT

## 2023-05-22 PROCEDURE — 97162 PT EVAL MOD COMPLEX 30 MIN: CPT

## 2023-05-22 PROCEDURE — 82607 VITAMIN B-12: CPT

## 2023-05-22 PROCEDURE — 87635 SARS-COV-2 COVID-19 AMP PRB: CPT

## 2023-05-22 PROCEDURE — 76770 US EXAM ABDO BACK WALL COMP: CPT

## 2023-05-22 PROCEDURE — 82436 ASSAY OF URINE CHLORIDE: CPT

## 2023-05-22 PROCEDURE — 80307 DRUG TEST PRSMV CHEM ANLYZR: CPT

## 2023-05-22 PROCEDURE — 85610 PROTHROMBIN TIME: CPT

## 2023-05-22 PROCEDURE — 84432 ASSAY OF THYROGLOBULIN: CPT

## 2023-05-22 PROCEDURE — 83690 ASSAY OF LIPASE: CPT

## 2023-05-22 PROCEDURE — 81001 URINALYSIS AUTO W/SCOPE: CPT

## 2023-05-22 PROCEDURE — 86140 C-REACTIVE PROTEIN: CPT

## 2023-05-22 PROCEDURE — 82570 ASSAY OF URINE CREATININE: CPT

## 2023-05-22 PROCEDURE — 87640 STAPH A DNA AMP PROBE: CPT

## 2023-05-22 PROCEDURE — 96365 THER/PROPH/DIAG IV INF INIT: CPT

## 2023-05-22 PROCEDURE — 71045 X-RAY EXAM CHEST 1 VIEW: CPT

## 2023-05-22 PROCEDURE — 87086 URINE CULTURE/COLONY COUNT: CPT

## 2023-05-22 PROCEDURE — 87077 CULTURE AEROBIC IDENTIFY: CPT

## 2023-05-22 PROCEDURE — 84133 ASSAY OF URINE POTASSIUM: CPT

## 2023-05-22 PROCEDURE — 84295 ASSAY OF SERUM SODIUM: CPT

## 2023-05-22 PROCEDURE — 83735 ASSAY OF MAGNESIUM: CPT

## 2023-05-22 PROCEDURE — 71275 CT ANGIOGRAPHY CHEST: CPT

## 2023-05-22 PROCEDURE — 85379 FIBRIN DEGRADATION QUANT: CPT

## 2023-05-22 PROCEDURE — 84146 ASSAY OF PROLACTIN: CPT

## 2023-05-22 PROCEDURE — 87637 SARSCOV2&INF A&B&RSV AMP PRB: CPT

## 2023-05-22 PROCEDURE — 97110 THERAPEUTIC EXERCISES: CPT

## 2023-05-22 PROCEDURE — 82803 BLOOD GASES ANY COMBINATION: CPT

## 2023-05-22 PROCEDURE — 82746 ASSAY OF FOLIC ACID SERUM: CPT

## 2023-05-22 PROCEDURE — 82330 ASSAY OF CALCIUM: CPT

## 2023-05-22 PROCEDURE — 80053 COMPREHEN METABOLIC PANEL: CPT

## 2023-05-22 PROCEDURE — 80048 BASIC METABOLIC PNL TOTAL CA: CPT

## 2023-05-22 PROCEDURE — 83930 ASSAY OF BLOOD OSMOLALITY: CPT

## 2023-05-22 PROCEDURE — 84132 ASSAY OF SERUM POTASSIUM: CPT

## 2023-05-22 PROCEDURE — 87186 SC STD MICRODIL/AGAR DIL: CPT

## 2023-05-22 PROCEDURE — 85014 HEMATOCRIT: CPT

## 2023-05-22 PROCEDURE — 84300 ASSAY OF URINE SODIUM: CPT

## 2023-05-22 PROCEDURE — 82728 ASSAY OF FERRITIN: CPT

## 2023-05-22 PROCEDURE — 83935 ASSAY OF URINE OSMOLALITY: CPT

## 2023-05-22 PROCEDURE — 97116 GAIT TRAINING THERAPY: CPT

## 2023-05-22 PROCEDURE — 87641 MR-STAPH DNA AMP PROBE: CPT

## 2023-05-22 PROCEDURE — 82947 ASSAY GLUCOSE BLOOD QUANT: CPT

## 2023-05-22 PROCEDURE — 84100 ASSAY OF PHOSPHORUS: CPT

## 2023-05-22 PROCEDURE — 80076 HEPATIC FUNCTION PANEL: CPT

## 2023-05-22 PROCEDURE — 84165 PROTEIN E-PHORESIS SERUM: CPT

## 2023-05-22 PROCEDURE — 93005 ELECTROCARDIOGRAM TRACING: CPT

## 2023-05-22 PROCEDURE — 86334 IMMUNOFIX E-PHORESIS SERUM: CPT

## 2023-05-22 PROCEDURE — 82435 ASSAY OF BLOOD CHLORIDE: CPT

## 2023-05-22 PROCEDURE — 86803 HEPATITIS C AB TEST: CPT

## 2023-05-22 PROCEDURE — 82565 ASSAY OF CREATININE: CPT

## 2023-05-22 PROCEDURE — 85018 HEMOGLOBIN: CPT

## 2023-05-22 PROCEDURE — 84439 ASSAY OF FREE THYROXINE: CPT

## 2023-05-22 PROCEDURE — 84443 ASSAY THYROID STIM HORMONE: CPT

## 2023-05-22 PROCEDURE — 96361 HYDRATE IV INFUSION ADD-ON: CPT

## 2023-05-22 PROCEDURE — 84155 ASSAY OF PROTEIN SERUM: CPT

## 2023-05-22 PROCEDURE — 36415 COLL VENOUS BLD VENIPUNCTURE: CPT

## 2023-05-22 PROCEDURE — 86800 THYROGLOBULIN ANTIBODY: CPT

## 2023-05-22 PROCEDURE — 85025 COMPLETE CBC W/AUTO DIFF WBC: CPT

## 2023-05-22 PROCEDURE — 83605 ASSAY OF LACTIC ACID: CPT

## 2023-05-22 PROCEDURE — 85027 COMPLETE CBC AUTOMATED: CPT

## 2023-05-22 PROCEDURE — 99285 EMERGENCY DEPT VISIT HI MDM: CPT

## 2023-05-22 PROCEDURE — 70450 CT HEAD/BRAIN W/O DYE: CPT | Mod: MA

## 2023-05-22 PROCEDURE — 74177 CT ABD & PELVIS W/CONTRAST: CPT

## 2023-05-22 RX ORDER — LEVOTHYROXINE SODIUM 125 MCG
88 TABLET ORAL DAILY
Refills: 0 | Status: DISCONTINUED | OUTPATIENT
Start: 2023-05-22 | End: 2023-05-22

## 2023-05-22 RX ORDER — LEVOTHYROXINE SODIUM 125 MCG
1 TABLET ORAL
Qty: 0 | Refills: 0 | DISCHARGE
Start: 2023-05-22

## 2023-05-22 RX ORDER — MIRTAZAPINE 45 MG/1
1 TABLET, ORALLY DISINTEGRATING ORAL
Qty: 30 | Refills: 0
Start: 2023-05-22 | End: 2023-06-20

## 2023-05-22 RX ORDER — MIRTAZAPINE 45 MG/1
1 TABLET, ORALLY DISINTEGRATING ORAL
Qty: 0 | Refills: 0 | DISCHARGE
Start: 2023-05-22

## 2023-05-22 RX ADMIN — ARIPIPRAZOLE 5 MILLIGRAM(S): 15 TABLET ORAL at 11:21

## 2023-05-22 RX ADMIN — Medication 500 MILLIGRAM(S): at 11:21

## 2023-05-22 RX ADMIN — AMLODIPINE BESYLATE 5 MILLIGRAM(S): 2.5 TABLET ORAL at 05:27

## 2023-05-22 RX ADMIN — Medication 100 MICROGRAM(S): at 05:27

## 2023-05-22 RX ADMIN — Medication 1 TABLET(S): at 11:21

## 2023-05-22 RX ADMIN — CHLORHEXIDINE GLUCONATE 1 APPLICATION(S): 213 SOLUTION TOPICAL at 11:23

## 2023-05-22 RX ADMIN — Medication 0.5 MILLIGRAM(S): at 17:05

## 2023-05-22 RX ADMIN — RIVAROXABAN 10 MILLIGRAM(S): KIT at 16:59

## 2023-05-22 NOTE — BH CONSULTATION LIAISON PROGRESS NOTE - NSICDXBHPRIMARYDX_PSY_ALL_CORE
Delirium   R41.0  

## 2023-05-22 NOTE — BH CONSULTATION LIAISON PROGRESS NOTE - NSBHASSESSMENTFT_PSY_ALL_CORE
Bipolar disorder  Delirium  Higher dose of Mirtazepine will impair sleep and increase risk of switch to austen  Abrupt d/c of a benzodiazepine increases risk of withdrawal

## 2023-05-22 NOTE — DISCHARGE NOTE NURSING/CASE MANAGEMENT/SOCIAL WORK - NSDCFUADDAPPT_GEN_ALL_CORE_FT
APPTS ARE READY TO BE MADE: [x] YES    Best Family or Patient Contact (if needed):    Additional Information about above appointments (if needed):    1: Follow up with PCP Dr. Ritchie outpatient within 1-2 weeks   2: Follow up with endocrine Dr. Rios outpatient within 4-6 weeks  3: Follow up with nephrologist Dr. Vikas Quach on as needed basis   4: Psychiatric follow up at Outpatient at [facility] ([phone])      
no

## 2023-05-22 NOTE — CHART NOTE - NSCHARTNOTEFT_GEN_A_CORE
Pt had Carranza for urinary retention that was removed last evening for TOV. After 8 hrs this morning pt reports voiding in bathroom without a measurement but post void bladder scan reveals 976 ml remaining in bladder. Pt unable to void presently. Carranza catheter to be replaced for urinary retention and failed TOV and pt to have F/U with urology upon discharge from [facility]. D/W Dr Espinosa who is in agreement with plan.
Left 1 message for the patient in regards to follow up care with callback information.
Medicine NP Episodic Note    HPI: 72F w/ PMHx of bipolar disorder and dementia ? hypothyroidism s/p thyroidectomy (had thyroid cancer), comes into the ED via EMS for failure to thrive,    Event Summary: Pt. w/ urinary retention and worsening JAYLA.  Straight cath x 2 for large volume urinary output.   > Folay cath (ordered)  > Strict I&Os  > F/u am labs   > Renal US (ordered)     Will endorse to primary team in am.  Attending to follow.    Yaneth Nazario, P-BC  [phone]
Patient medically cleared as per Dr. Espinosa.   D/w Dr. Espinosa and psych- recommending Ativan 0.5 prior to dc and Ativan 0.5 (as a taper from 0.75 mg) x 5 days with psych reassessment at [facility].   D/C [facility] rec d/w psych and Dr. Espinosa.  Hemodynamically stable for discharge today.
Unable to connect to patient on contact number listed or on the phone in their room.

## 2023-05-22 NOTE — BH CONSULTATION LIAISON PROGRESS NOTE - NSBHCHARTREVIEWVS_PSY_A_CORE FT
Vital Signs Last 24 Hrs  T(C): 36.4 (12 May 2023 13:10), Max: 37.2 (12 May 2023 02:05)  T(F): 97.5 (12 May 2023 13:10), Max: 99 (12 May 2023 02:05)  HR: 104 (12 May 2023 13:10) (99 - 104)  BP: 135/92 (12 May 2023 13:10) (135/92 - 167/116)  BP(mean): --  RR: 18 (12 May 2023 13:10) (17 - 18)  SpO2: 97% (12 May 2023 13:10) (97% - 99%)    Parameters below as of 12 May 2023 13:10  Patient On (Oxygen Delivery Method): room air    
Vital Signs Last 24 Hrs  T(C): 36.2 (15 May 2023 09:37), Max: 37.3 (15 May 2023 04:40)  T(F): 97.1 (15 May 2023 09:37), Max: 99.1 (15 May 2023 04:40)  HR: 112 (15 May 2023 09:37) (100 - 112)  BP: 117/72 (15 May 2023 09:37) (117/72 - 135/86)  BP(mean): --  RR: 18 (15 May 2023 09:37) (18 - 18)  SpO2: 98% (15 May 2023 09:37) (97% - 99%)    Parameters below as of 15 May 2023 09:37  Patient On (Oxygen Delivery Method): room air    
Vital Signs Last 24 Hrs  T(C): 36.6 (19 May 2023 08:12), Max: 36.9 (19 May 2023 00:56)  T(F): 97.9 (19 May 2023 08:12), Max: 98.5 (19 May 2023 00:56)  HR: 112 (19 May 2023 08:12) (103 - 113)  BP: 130/86 (19 May 2023 08:12) (121/82 - 132/79)  BP(mean): --  RR: 18 (19 May 2023 08:12) (18 - 18)  SpO2: 99% (19 May 2023 08:12) (97% - 100%)    Parameters below as of 19 May 2023 08:12  Patient On (Oxygen Delivery Method): room air    
Vital Signs Last 24 Hrs  T(C): 36.7 (22 May 2023 10:30), Max: 36.7 (21 May 2023 16:37)  T(F): 98 (22 May 2023 10:30), Max: 98 (21 May 2023 16:37)  HR: 122 (22 May 2023 10:30) (106 - 122)  BP: 122/72 (22 May 2023 10:30) (104/72 - 137/84)  BP(mean): --  RR: 18 (22 May 2023 10:30) (18 - 18)  SpO2: 98% (22 May 2023 10:30) (95% - 99%)    Parameters below as of 22 May 2023 04:30  Patient On (Oxygen Delivery Method): room air    
Vital Signs Last 24 Hrs  T(C): 36.4 (16 May 2023 13:12), Max: 36.7 (15 May 2023 16:16)  T(F): 97.6 (16 May 2023 13:12), Max: 98.1 (15 May 2023 20:15)  HR: 111 (16 May 2023 13:12) (96 - 111)  BP: 100/73 (16 May 2023 13:12) (100/73 - 130/78)  BP(mean): --  RR: 18 (16 May 2023 13:12) (18 - 18)  SpO2: 97% (16 May 2023 13:12) (97% - 100%)    Parameters below as of 16 May 2023 13:12  Patient On (Oxygen Delivery Method): room air    
Vital Signs Last 24 Hrs  T(C): 37.1 (11 May 2023 09:22), Max: 37.2 (11 May 2023 01:32)  T(F): 98.7 (11 May 2023 09:22), Max: 99 (11 May 2023 01:32)  HR: 97 (11 May 2023 09:22) (78 - 99)  BP: 148/94 (11 May 2023 09:22) (130/85 - 154/98)  BP(mean): --  RR: 18 (11 May 2023 09:22) (18 - 18)  SpO2: 100% (11 May 2023 09:22) (96% - 100%)    Parameters below as of 11 May 2023 09:22  Patient On (Oxygen Delivery Method): room air    
Vital Signs Last 24 Hrs  T(C): 36.8 (18 May 2023 10:28), Max: 36.9 (18 May 2023 02:00)  T(F): 98.3 (18 May 2023 10:28), Max: 98.5 (18 May 2023 02:00)  HR: 101 (18 May 2023 10:28) (101 - 111)  BP: 132/77 (18 May 2023 10:28) (114/74 - 132/77)  BP(mean): --  RR: 18 (18 May 2023 10:28) (18 - 18)  SpO2: 98% (18 May 2023 10:28) (97% - 100%)    Parameters below as of 18 May 2023 10:28  Patient On (Oxygen Delivery Method): room air    
Vital Signs Last 24 Hrs  T(C): 36.4 (17 May 2023 13:45), Max: 37 (17 May 2023 01:51)  T(F): 97.6 (17 May 2023 13:45), Max: 98.6 (17 May 2023 01:51)  HR: 106 (17 May 2023 13:45) (95 - 113)  BP: 119/72 (17 May 2023 13:45) (114/76 - 127/78)  BP(mean): --  RR: 18 (17 May 2023 13:45) (18 - 18)  SpO2: 99% (17 May 2023 13:45) (97% - 99%)    Parameters below as of 17 May 2023 13:45  Patient On (Oxygen Delivery Method): room air

## 2023-05-22 NOTE — PROGRESS NOTE ADULT - NS ATTEND AMEND GEN_ALL_CORE FT
Chart, labs, vitals, radiology reviewed. Above H&P reviewed and edited where appropriate. Agree with history and physical exam. Agree with assessment and plan. I reviewed the overnight course of events and discussed the care with the patient/ family.  All the decisions in assessment and plan are solely made by me..

## 2023-05-22 NOTE — BH CONSULTATION LIAISON PROGRESS NOTE - NSBHCONSULTFOLLOWAFTERCARE_PSY_A_CORE FT
The psychiatrist at her rehab to follow up there. 
[facility] outpatient psychiatry ([phone])
If going to rehab, can have the psychiatrist at rehab follow up.  She should not return home at this point unsupervised. 
Have the psychiatrist at the pt's rehab follow up there
The psychiatrist at her rehab can follow up there. 
Outpatient at [facility] ([phone])  Outpatient neurology and Endocrinology referrals

## 2023-05-22 NOTE — BH CONSULTATION LIAISON PROGRESS NOTE - NSBHFUPINTERVALHXFT_PSY_A_CORE
Her Ativan was abruptly discontinued and Remeron dose increased to 15mg (to improve sleep per P.A.). She is tachycardic. Scheduled to go to rehab today.

## 2023-05-22 NOTE — BH CONSULTATION LIAISON PROGRESS NOTE - NSBHPTASSESSDT_PSY_A_CORE
19-May-2023 11:52
12-May-2023 14:53
15-May-2023 14:06
17-May-2023 15:25
22-May-2023 13:21
11-May-2023 12:13
16-May-2023 14:24
18-May-2023 11:55

## 2023-05-22 NOTE — DISCHARGE NOTE NURSING/CASE MANAGEMENT/SOCIAL WORK - PATIENT PORTAL LINK FT
You can access the FollowMyHealth Patient Portal offered by [facility] by registering at the following website: [email or web address]. By joining [facility]’s FollowMyHealth portal, you will also be able to view your health information using other applications (apps) compatible with our system.

## 2023-05-22 NOTE — PROGRESS NOTE ADULT - PROVIDER SPECIALTY LIST ADULT
Endocrinology
Internal Medicine
Nephrology
Endocrinology
Infectious Disease
Internal Medicine
Nephrology
Nephrology
Infectious Disease
Internal Medicine
Endocrinology
Internal Medicine
Internal Medicine
Nephrology
Endocrinology
Internal Medicine
Endocrinology
Internal Medicine
Endocrinology

## 2023-05-22 NOTE — PROGRESS NOTE ADULT - PROBLEM SELECTOR PLAN 2
Suggest to continue medications, monitoring, FU primary team recommendations. Cont supportive care, on isolation
Suggest to continue medications, monitoring, FU primary team recommendations.
Suggest to continue medications, monitoring, FU primary team recommendations. Cont supportive care, on isolation
Suggest to continue medications, monitoring, FU primary team recommendations. Cont supportive care, on isolation
Suggest to continue medications, monitoring, FU primary team recommendations.
Suggest to continue medications, monitoring, FU primary team recommendations. Cont supportive care, on isolation
Suggest to continue medications, monitoring, FU primary team recommendations.
Suggest to continue medications, monitoring, FU primary team recommendations. Cont supportive care, on isolation
Suggest to continue medications, monitoring, FU primary team recommendations.
Suggest to continue medications, monitoring, FU primary team recommendations.
Suggest to continue medications, monitoring, FU primary team recommendations. Cont supportive care, on isolation
Suggest to continue medications, monitoring, FU primary team recommendations.

## 2023-05-22 NOTE — BH CONSULTATION LIAISON PROGRESS NOTE - NSBHADMITIPOBS_PSY_A_CORE
Routine observation
Routine observation
Enhanced supervision
Routine observation
Enhanced supervision

## 2023-05-22 NOTE — BH CONSULTATION LIAISON PROGRESS NOTE - NSBHCONSULTPRIMARYDISCUSSYES_PSY_A_CORE FT
as above
as above
Discussed with P.A. 
as above
Discussed with NP, RN, and Dr. Espinosa
as above, discussed with NP. 
as above
as above

## 2023-05-22 NOTE — BH CONSULTATION LIAISON PROGRESS NOTE - MSE UNSTRUCTURED FT
Elderly WF in chair relaxing. Awake, alert, and oriented x 3. No diaphoresis, tremors, or psychomotor abnormalities. I and J are fair. Speech is low tone, coherent, but takes a while to answer. She appears more engaged to speak. No hallucinations. No delusions- she did not answer me when I asked her if she still felt people outside the hospital are going to harm her. No suicidal nor homicidal ideation or plan. Mood is "ok" and affect constricted. Attention and concentration are fair but impaired long term memory (she thought her niece who she often calls is living in [facility] even though the niece lives in [place]). STM also decreased (she claims not to know what her medical issues are). 
WF in bed. Awake, alert, and oriented x 3. No diaphoresis or tremors. I and J fair. She obsesses over not having clothes and needing to call her neighbor. She claims not to know how to use her cell phone. No hallucinations, delusions, suicidal ideation or plan, nor homicidal ideation or plan. Mood is anxious and affect constricted. Does not answer questions asked and instead perseverates on not having clothes available. STM, LTM fair. Decreased attention and concentration. 
WF in bed. Awake, alert and oriented x 3. No diaphoresis, tremors, or restlessness. I and J impaired. Speech is vague and she takes a long time to answer. When she does answer questions, she gives tangential responses. No hallucinations. Still with paranoid delusions that people (who she does not name) on the outside are trying to harm her. Mood is frustrated and affect constricted/flat. No suicidal nor homicidal ideation or plan. Attention and concentration impaired. STM and LTM fair. 
WF in bed. Awake, alert, and oriented x 3. I and J are fair. Speech is coherent but often does not directly answer questions (e.g. how she sleeps and eats). Perseverates on not having clothes. No hallucinations nor delusions. No suicidal nor homicidal ideation or plan. Mood is frustrated and affect constricted. No change in cognitive status. No tremors, oral buccal dyskinesias, nor other sx. of EPS. 
WF in chair at bedside. Awake, alert, and oriented x 3 but long latency in some responses and forgot where her niece lives (despite my telling her yesterday that the niece is now in [place]). No diaphoresis or tremors. I and J fair. No hallucinations nor delusions. She denies paranoid thought process. No suicidal or homicidal ideation or plan. Mood is dysphoric and affect constricted. Attention and concentration reduced as I had to often repeat questions. LTM fair. STM reduced (as above does not recall my conversation yesterday about her niece). 
Elderly WF seen with Dr. Tinajero (ID). The pt. is awake, alert, and oriented x 3. Some mild resting hand tremors. No diaphoresis. I and J impaired. Speech is coherent but low volume and long latency in responses. No hallucinations but still with paranoid delusions that outside people (who she does not name) are out to harm her. No suicidal ideation or plan. Mood is anxious and affect constricted. Attention and concentration and short term memory impaired. Long term memory intact. Questions often needed repeating. 
WF in bed. Calm, alert, and oriented x 3 but at one point stated she would like to call her mother. When I asked her where her mother is, she said she meant she would like to call her sister. I and J fair. No psychomotor abnormalities. Speech is circumstantial. No hallucinations nor delusions. Less paranoid compared to earlier from the admission. No suicidal or homicidal ideation or plan. Mood is frustrated and affect constricted. Attention and concentration, STM, LTM fair. 
Elderly WF in a chair. Awake, alert, and oriented x 3. Perseverates on not having all of her belongings/bags. No diaphoresis or tremors. No hallucinations nor delusions. No suicidal ideation or plan and no homicidal ideation or plan. Speech is coherent with normal rate and volume but she frequently does not directly answer my questions. Attention/conc. reduced. STM, LTM fair. No restlessness.

## 2023-05-22 NOTE — BH CONSULTATION LIAISON PROGRESS NOTE - NSBHFUPINTERVALCCFT_PSY_A_CORE
"I need somebody to call my neighbor to get my clothes."
Pt. complains of not having her clothes and wants to have staff call her neighbor for her clothes
"I need somebody to help me brush my teeth."
No new complaints
"I'd like to call my mother."
"I want a Coca [employer]a."
"I need (to drink a beverage with) ice."
Pt. complains of missing her bags

## 2023-05-22 NOTE — BH CONSULTATION LIAISON PROGRESS NOTE - NSBHCONSFOLLOWNEEDS_PSY_ALL_CORE
No psychiatric contraindications to discharge
No psychiatric contraindications to discharge
Needs further psych safety assessment prior to discharge
No psychiatric contraindications to discharge
Needs further psych safety assessment prior to discharge
No psychiatric contraindications to discharge

## 2023-05-22 NOTE — PROGRESS NOTE ADULT - NUTRITIONAL ASSESSMENT
This patient has been assessed with a concern for Malnutrition and has been determined to have a diagnosis/diagnoses of Severe protein-calorie malnutrition and Underweight (BMI < 19).    This patient is being managed with:   Diet Regular-  Entered: May  8 2023  6:26PM  
This patient has been assessed with a concern for Malnutrition and has been determined to have a diagnosis/diagnoses of Severe protein-calorie malnutrition and Underweight (BMI < 19).    This patient is being managed with:   Diet Regular-  Entered: May  8 2023  6:26PM    This patient has been assessed with a concern for Malnutrition and has been determined to have a diagnosis/diagnoses of Severe protein-calorie malnutrition and Underweight (BMI < 19).    This patient is being managed with:   Diet Regular-  Entered: May  8 2023  6:26PM  
This patient has been assessed with a concern for Malnutrition and has been determined to have a diagnosis/diagnoses of Severe protein-calorie malnutrition and Underweight (BMI < 19).    This patient is being managed with:   Diet Regular-  Entered: May  8 2023  6:26PM  
This patient has been assessed with a concern for Malnutrition and has been determined to have a diagnosis/diagnoses of Severe protein-calorie malnutrition and Underweight (BMI < 19).    This patient is being managed with:   Diet Regular-  Entered: May  8 2023  6:26PM

## 2023-05-22 NOTE — PROGRESS NOTE ADULT - SUBJECTIVE AND OBJECTIVE BOX
Chief complaint  Patient is a 72y old  Female who presents with a chief complaint of FTT (18 May 2023 16:50)         Labs and Fingersticks  CAPILLARY BLOOD GLUCOSE                            Medications  MEDICATIONS  (STANDING):  amLODIPine   Tablet 5 milliGRAM(s) Oral daily  ARIPiprazole 5 milliGRAM(s) Oral daily  ascorbic acid 500 milliGRAM(s) Oral daily  chlorhexidine 2% Cloths 1 Application(s) Topical daily  levothyroxine 88 MICROGram(s) Oral daily  LORazepam     Tablet 0.5 milliGRAM(s) Oral once  mirtazapine 15 milliGRAM(s) Oral at bedtime  multivitamin 1 Tablet(s) Oral daily  polyethylene glycol 3350 17 Gram(s) Oral daily  rivaroxaban 10 milliGRAM(s) Oral daily  senna 2 Tablet(s) Oral at bedtime      Physical Exam  General: Patient comfortable in bed  Vital Signs Last 12 Hrs  T(F): 97.4 (05-22-23 @ 13:36), Max: 98 (05-22-23 @ 10:30)  HR: 119 (05-22-23 @ 13:36) (109 - 122)  BP: 110/75 (05-22-23 @ 13:36) (110/75 - 137/84)  BP(mean): --  RR: 18 (05-22-23 @ 13:36) (18 - 18)  SpO2: 98% (05-22-23 @ 13:36) (98% - 99%)    CVS: S1S2   Respiratory: No wheezing, no crepitations  GI: Abdomen soft, bowel sounds positive  Musculoskeletal:  moves all extremities  : Voiding        Chief complaint  Patient is a 72y old  Female who presents with a chief complaint of FTT (18 May 2023 16:50)         Labs and Fingersticks  CAPILLARY BLOOD GLUCOSE                            Medications  MEDICATIONS  (STANDING):  amLODIPine   Tablet 5 milliGRAM(s) Oral daily  ARIPiprazole 5 milliGRAM(s) Oral daily  ascorbic acid 500 milliGRAM(s) Oral daily  chlorhexidine 2% Cloths 1 Application(s) Topical daily  levothyroxine 88 MICROGram(s) Oral daily  LORazepam     Tablet 0.5 milliGRAM(s) Oral once  mirtazapine 15 milliGRAM(s) Oral at bedtime  multivitamin 1 Tablet(s) Oral daily  polyethylene glycol 3350 17 Gram(s) Oral daily  rivaroxaban 10 milliGRAM(s) Oral daily  senna 2 Tablet(s) Oral at bedtime      Physical Exam  General: Patient comfortable in bed  Vital Signs Last 12 Hrs  T(F): 97.4 (05-22-23 @ 13:36), Max: 98 (05-22-23 @ 10:30)  HR: 119 (05-22-23 @ 13:36) (109 - 122)  BP: 110/75 (05-22-23 @ 13:36) (110/75 - 137/84)  BP(mean): --  RR: 18 (05-22-23 @ 13:36) (18 - 18)  SpO2: 98% (05-22-23 @ 13:36) (98% - 99%)    CVS: S1S2   Respiratory: No wheezing, no crepitations  GI: Abdomen soft, bowel sounds positive  Musculoskeletal:  moves all extremities  : Voiding

## 2023-05-22 NOTE — BH CONSULTATION LIAISON PROGRESS NOTE - NSBHCONSULTRECOMMENDOTHER_PSY_A_CORE FT
Taper Ativan to 0.50mg qhs for one week then psychiatrist at rehab can reassess need to continue or d/c it  Remeron 7.5mg qhs  Abilify 5mg/d

## 2023-05-22 NOTE — DISCHARGE NOTE NURSING/CASE MANAGEMENT/SOCIAL WORK - NSDCPEFALRISK_GEN_ALL_CORE
For information on Fall & Injury Prevention, visit: [email or web address] OR  [email or web address] OR  https://www.cdc.gov/steadi/patient.html

## 2023-05-22 NOTE — BH CONSULTATION LIAISON PROGRESS NOTE - NSBHATTESTBILLBASEDTIME_PSY_A_CORE
Time based billing

## 2023-05-22 NOTE — PROGRESS NOTE ADULT - PROBLEM SELECTOR PROBLEM 2
2019 novel coronavirus disease (COVID-19)

## 2023-05-22 NOTE — BH CONSULTATION LIAISON PROGRESS NOTE - CURRENT MEDICATION
MEDICATIONS  (STANDING):  ARIPiprazole 2 milliGRAM(s) Oral <User Schedule>  cefuroxime   Tablet 250 milliGRAM(s) Oral every 12 hours  chlorhexidine 2% Cloths 1 Application(s) Topical daily  enoxaparin Injectable 40 milliGRAM(s) SubCutaneous every 12 hours  levothyroxine 112 MICROGram(s) Oral daily  LORazepam     Tablet 0.5 milliGRAM(s) Oral at bedtime  remdesivir  IVPB   IV Intermittent   remdesivir  IVPB 100 milliGRAM(s) IV Intermittent every 24 hours  senna 2 Tablet(s) Oral at bedtime  sodium chloride 0.9%. 1000 milliLiter(s) (80 mL/Hr) IV Continuous <Continuous>    MEDICATIONS  (PRN):  LORazepam     Tablet 0.5 milliGRAM(s) Oral every 4 hours PRN CIWA-Ar score 8 or greater  
MEDICATIONS  (STANDING):  amLODIPine   Tablet 5 milliGRAM(s) Oral daily  ARIPiprazole 5 milliGRAM(s) Oral daily  ascorbic acid 500 milliGRAM(s) Oral daily  chlorhexidine 2% Cloths 1 Application(s) Topical daily  enoxaparin Injectable 30 milliGRAM(s) SubCutaneous every 24 hours  levothyroxine 100 MICROGram(s) Oral daily  LORazepam     Tablet 0.75 milliGRAM(s) Oral at bedtime  mirtazapine 7.5 milliGRAM(s) Oral at bedtime  multivitamin 1 Tablet(s) Oral daily  senna 2 Tablet(s) Oral at bedtime    MEDICATIONS  (PRN):  
MEDICATIONS  (STANDING):  amLODIPine   Tablet 5 milliGRAM(s) Oral daily  ARIPiprazole 5 milliGRAM(s) Oral daily  ascorbic acid 500 milliGRAM(s) Oral daily  chlorhexidine 2% Cloths 1 Application(s) Topical daily  levothyroxine 88 MICROGram(s) Oral daily  LORazepam     Tablet 0.5 milliGRAM(s) Oral once  mirtazapine 15 milliGRAM(s) Oral at bedtime  multivitamin 1 Tablet(s) Oral daily  polyethylene glycol 3350 17 Gram(s) Oral daily  rivaroxaban 10 milliGRAM(s) Oral daily  senna 2 Tablet(s) Oral at bedtime    MEDICATIONS  (PRN):  
MEDICATIONS  (STANDING):  amLODIPine   Tablet 5 milliGRAM(s) Oral daily  ARIPiprazole 5 milliGRAM(s) Oral daily  ascorbic acid 500 milliGRAM(s) Oral daily  cefuroxime   Tablet 250 milliGRAM(s) Oral every 12 hours  chlorhexidine 2% Cloths 1 Application(s) Topical daily  enoxaparin Injectable 30 milliGRAM(s) SubCutaneous every 24 hours  levothyroxine 100 MICROGram(s) Oral daily  LORazepam     Tablet 0.75 milliGRAM(s) Oral at bedtime  multivitamin 1 Tablet(s) Oral daily  senna 2 Tablet(s) Oral at bedtime    MEDICATIONS  (PRN):  
MEDICATIONS  (STANDING):  amLODIPine   Tablet 5 milliGRAM(s) Oral daily  ARIPiprazole 5 milliGRAM(s) Oral daily  ascorbic acid 500 milliGRAM(s) Oral daily  cefuroxime   Tablet 250 milliGRAM(s) Oral every 12 hours  chlorhexidine 2% Cloths 1 Application(s) Topical daily  enoxaparin Injectable 30 milliGRAM(s) SubCutaneous every 24 hours  levothyroxine 100 MICROGram(s) Oral daily  LORazepam     Tablet 0.75 milliGRAM(s) Oral at bedtime  multivitamin 1 Tablet(s) Oral daily  senna 2 Tablet(s) Oral at bedtime    MEDICATIONS  (PRN):  LORazepam     Tablet 0.5 milliGRAM(s) Oral every 4 hours PRN CIWA-Ar score 8 or greater  
MEDICATIONS  (STANDING):  amLODIPine   Tablet 5 milliGRAM(s) Oral daily  ARIPiprazole 2 milliGRAM(s) Oral <User Schedule>  ascorbic acid 500 milliGRAM(s) Oral daily  chlorhexidine 2% Cloths 1 Application(s) Topical daily  dextrose 5%. 1000 milliLiter(s) (60 mL/Hr) IV Continuous <Continuous>  enoxaparin Injectable 40 milliGRAM(s) SubCutaneous every 12 hours  levothyroxine 100 MICROGram(s) Oral daily  LORazepam     Tablet 0.5 milliGRAM(s) Oral at bedtime  multivitamin 1 Tablet(s) Oral daily  remdesivir  IVPB   IV Intermittent   remdesivir  IVPB 100 milliGRAM(s) IV Intermittent every 24 hours  senna 2 Tablet(s) Oral at bedtime    MEDICATIONS  (PRN):  LORazepam     Tablet 0.5 milliGRAM(s) Oral every 4 hours PRN CIWA-Ar score 8 or greater

## 2023-05-22 NOTE — BH CONSULTATION LIAISON PROGRESS NOTE - NSBHATTESTBILLING_PSY_A_CORE
22642-Yzauuxezib OBS or IP - moderate complexity OR 35-49 mins
91513-Xepygbgzlx OBS or IP - moderate complexity OR 35-49 mins
36654-Bsvjdqvkuc OBS or IP - moderate complexity OR 35-49 mins
58795-Dxssctbbte OBS or IP - moderate complexity OR 35-49 mins
82473-Kvjadngoxd OBS or IP - moderate complexity OR 35-49 mins
87409-Vgjxsesidu OBS or IP - moderate complexity OR 35-49 mins
93387-Yztrifvcay OBS or IP - moderate complexity OR 35-49 mins
84834-Nvtwpxevux OBS or IP - moderate complexity OR 35-49 mins

## 2023-05-22 NOTE — CHART NOTE - NSCHARTNOTESELECT_GEN_ALL_CORE
D/C/Event Note
Event Note
Inpatient Referrals/Event Note
d/c appointment/Event Note
Failed TOV/Event Note

## 2023-05-22 NOTE — PROGRESS NOTE ADULT - PROBLEM SELECTOR PLAN 1
Decreased Synthroid down to 100 mcg daily  Can be DC'd on current dose  Suggest to repeat thyroid function test in 4-6 weeks. Outpatient follow up
Decreased Synthroid down to 100 mcg daily  Can be DC'd on current dose  Suggest to repeat thyroid function test in 4-6 weeks. Outpatient follow up
Decreased Synthroid down to 100 mcg daily  Can be DC'd on current dose.   Suggest to repeat thyroid function test in 4-6 weeks. Outpatient follow up.
Decreased Synthroid down to 100 mcg daily  DC on current dose.   Suggest to repeat thyroid function test in 4-6 weeks. Outpatient follow up.
Decreased Synthroid down to 100 mcg daily  DC on current dose.   Suggest to repeat thyroid function test in 4-6 weeks. Outpatient follow up.
Decreased Synthroid down to 100 mcg daily  Suggest to repeat thyroid function test in 4-6 weeks. Outpatient follow up.
Decreased Synthroid down to 100 mcg daily  Suggest to repeat thyroid function test in 4-6 weeks. Outpatient follow up.
Decreased Synthroid down to 100 mcg daily  Can be DC'd on current dose  Suggest to repeat thyroid function test in 4-6 weeks. Outpatient follow up
Decreased Synthroid down to 100 mcg daily  Can be DC'd on current dose.   Suggest to repeat thyroid function test in 4-6 weeks. Outpatient follow up.
Decreased Synthroid down to 100 mcg daily  Can be DC'd on current dose.   Suggest to repeat thyroid function test in 4-6 weeks. Outpatient follow up.
Decreased Synthroid down to 100 mcg daily  Can be DC'd on current dose  Suggest to repeat thyroid function test in 4-6 weeks. Outpatient follow up
Decreased Synthroid down to 100 mcg daily  Suggest to repeat thyroid function test in 4-6 weeks. Outpatient follow up.

## 2023-05-22 NOTE — BH CONSULTATION LIAISON PROGRESS NOTE - NSBHCONSULTMEDPRNREASON_PSY_A_CORE
severe agitation...
anxiety...

## 2023-05-22 NOTE — PROGRESS NOTE ADULT - ASSESSMENT
Assessment  72y female with history of thyroid cancer, bipolar d/o, lives alone and by report has been very withdrawn, very poor po intake and  has recent weight loss, patient is awake and alert, no overnight events. DC planning per primary team.   Hypothyroidism: On Synthroid 112 mcg po daily, TSH 0.06, FT4 2.2. Now Hyperthyroid. Thyroxine adjusted, decreased dose,  Hx of Thyroidectomy due to thyroid cancer.  Thyroglobulin/AB  negative. Asymptomatic.   COVID+: supportive care, s/p remdesivir. now off isolation precautions. stable on room air    Hypercalcemia: mild, dehydration  resolved s/p IVF hydration, stable.         Discussed plan and management with Dr Blanca Sharp NP - TEAMS  Sloan Rios MD  Cell: [phone]  Office: [phone]          Assessment  72y female with history of thyroid cancer, bipolar d/o, lives alone and by report has been very withdrawn, very poor po intake and  has recent  weight loss, patient is awake and alert, no overnight events. DC planning per primary team.   Hypothyroidism: On Synthroid 112 mcg po daily, TSH 0.06, FT4 2.2. Now Hyperthyroid. Thyroxine adjusted, decreased dose,  Hx of Thyroidectomy due to thyroid cancer.  Thyroglobulin/AB  negative. Asymptomatic.   COVID+: supportive care, s/p remdesivir. now off isolation precautions. stable on room air    Hypercalcemia: mild, dehydration  resolved s/p IVF hydration, stable.         Discussed plan and management with Dr Blanca Sharp NP - TEAMS  Sloan Rios MD  Cell: [phone]  Office: [phone]

## 2024-10-28 NOTE — BH CONSULTATION LIAISON ASSESSMENT NOTE - NSBHREFERDETAILS_PSY_A_CORE_FT
Is This A New Presentation, Or A Follow-Up?: Follow Up [facility] Additional History: Sites treated with LN2 at last visit. History of Bipolar disorder. Off Lithium for years. Does she need psych meds?

## 2025-05-15 NOTE — CONSULT NOTE ADULT - CONSULT REASON
Patient presents to the clinic today for a visit with JOCELYN Ron CNP regarding Pain Management.    {Rooming staff  Please complete the PEG Assessment  Assess Pain, Function, and Quality of Life. Complete every pain visit :[id number]}      1/27/2025     9:27 AM 4/16/2025     7:56 AM 5/15/2025     1:28 PM   PEG Score   PEG Total Score 3.33 4.33 4       UDS/CSA- 01.27.2025    Medications- Oxycodone 2.5 mg this am    Notes    Domi GUERRIER [facility] Clinical Assistant  
hypothyroid history  low TSH
sacral/bilateral buttocks
covid
Azotemia/Hypernatremia